# Patient Record
Sex: FEMALE | Race: WHITE | Employment: FULL TIME | URBAN - NONMETROPOLITAN AREA
[De-identification: names, ages, dates, MRNs, and addresses within clinical notes are randomized per-mention and may not be internally consistent; named-entity substitution may affect disease eponyms.]

---

## 2017-02-17 ENCOUNTER — OFFICE VISIT (OUTPATIENT)
Dept: FAMILY MEDICINE | Facility: CLINIC | Age: 37
End: 2017-02-17
Payer: COMMERCIAL

## 2017-02-17 VITALS
WEIGHT: 133 LBS | HEART RATE: 77 BPM | DIASTOLIC BLOOD PRESSURE: 64 MMHG | TEMPERATURE: 97.6 F | HEIGHT: 63 IN | BODY MASS INDEX: 23.57 KG/M2 | SYSTOLIC BLOOD PRESSURE: 102 MMHG | OXYGEN SATURATION: 100 %

## 2017-02-17 DIAGNOSIS — Z00.00 ROUTINE GENERAL MEDICAL EXAMINATION AT A HEALTH CARE FACILITY: Primary | ICD-10-CM

## 2017-02-17 DIAGNOSIS — R87.810 CERVICAL HIGH RISK HPV (HUMAN PAPILLOMAVIRUS) TEST POSITIVE: ICD-10-CM

## 2017-02-17 PROCEDURE — 99395 PREV VISIT EST AGE 18-39: CPT | Performed by: FAMILY MEDICINE

## 2017-02-17 PROCEDURE — G0124 SCREEN C/V THIN LAYER BY MD: HCPCS | Performed by: FAMILY MEDICINE

## 2017-02-17 PROCEDURE — G0145 SCR C/V CYTO,THINLAYER,RESCR: HCPCS | Performed by: FAMILY MEDICINE

## 2017-02-17 PROCEDURE — 87624 HPV HI-RISK TYP POOLED RSLT: CPT | Performed by: FAMILY MEDICINE

## 2017-02-17 NOTE — MR AVS SNAPSHOT
After Visit Summary   2/17/2017    Joel Arthur    MRN: [id number]           Patient Information     Date Of Birth          1980        Visit Information        Provider Department      2/17/2017 9:40 AM Tish Gibson MD [facility]        Today's Diagnoses     Routine general medical examination at a health care facility    -  1    Cervical high risk HPV (human papillomavirus) test positive          Care Instructions      Preventive Health Recommendations  Female Ages 26 - 39  Yearly exam:   See your health care provider every year in order to    Review health changes.     Discuss preventive care.      Review your medicines if you your doctor has prescribed any.    Until age 30: Get a Pap test every three years (more often if you have had an abnormal result).    After age 30: Talk to your doctor about whether you should have a Pap test every 3 years or have a Pap test with HPV screening every 5 years.   You do not need a Pap test if your uterus was removed (hysterectomy) and you have not had cancer.  You should be tested each year for STDs (sexually transmitted diseases), if you're at risk.   Talk to your provider about how often to have your cholesterol checked.  If you are at risk for diabetes, you should have a diabetes test (fasting glucose).  Shots: Get a flu shot each year. Get a tetanus shot every 10 years.   Nutrition:     Eat at least 5 servings of fruits and vegetables each day.    Eat whole-grain bread, whole-wheat pasta and brown rice instead of white grains and rice.    Talk to your provider about Calcium and Vitamin D.     Lifestyle    Exercise at least 150 minutes a week (30 minutes a day, 5 days of the week). This will help you control your weight and prevent disease.    Limit alcohol to one drink per day.    No smoking.     Wear sunscreen to prevent skin cancer.    See your dentist every six months for an exam and cleaning.          Follow-ups after your  "visit        Who to contact     If you have questions or need follow up information about today's clinic visit or your schedule please contact [facility] directly at [phone].  Normal or non-critical lab and imaging results will be communicated to you by Nuzzelhart, letter or phone within 4 business days after the clinic has received the results. If you do not hear from us within 7 days, please contact the clinic through Nuzzelhart or phone. If you have a critical or abnormal lab result, we will notify you by phone as soon as possible.  Submit refill requests through [employer] or call your pharmacy and they will forward the refill request to us. Please allow 3 business days for your refill to be completed.          Additional Information About Your Visit        [facility] Information     [employer] gives you secure access to your electronic health record. If you see a primary care provider, you can also send messages to your care team and make appointments. If you have questions, please call your primary care clinic.  If you do not have a primary care provider, please call [phone] and they will assist you.        Care EveryWhere ID     This is your Care EveryWhere ID. This could be used by other organizations to access your [place] medical records  [id number]        Your Vitals Were     Pulse Temperature Height Last Period Pulse Oximetry BMI (Body Mass Index)    77 97.6  F (36.4  C) (Tympanic) 5' 2.5\" (1.588 m) 02/10/2017 100% 23.94 kg/m2       Blood Pressure from Last 3 Encounters:   02/17/17 102/64   02/15/16 104/72   01/18/16 108/61    Weight from Last 3 Encounters:   02/17/17 133 lb (60.3 kg)   02/15/16 140 lb (63.5 kg)   01/18/16 140 lb (63.5 kg)              Today, you had the following     No orders found for display         Today's Medication Changes          These changes are accurate as of: 2/17/17 10:20 AM.  If you have any questions, ask your nurse or doctor.               Stop taking these " medicines if you haven't already. Please contact your care team if you have questions.     clotrimazole 1 % cream   Commonly known as:  LOTRIMIN   Stopped by:  Tish Gibson MD                    Primary Care Provider Office Phone # Fax #    Tish Gibson [phone]       [facility] [address] [facility] [address]        Thank you!     Thank you for choosing [facility]  for your care. Our goal is always to provide you with excellent care. Hearing back from our patients is one way we can continue to improve our services. Please take a few minutes to complete the written survey that you may receive in the mail after your visit with us. Thank you!             Your Updated Medication List - Protect others around you: Learn how to safely use, store and throw away your medicines at www.disposemymeds.org.          This list is accurate as of: 2/17/17 10:20 AM.  Always use your most recent med list.                   Brand Name Dispense Instructions for use    ibuprofen 400 MG tablet    ADVIL/MOTRIN    120 tablet    Take 400-800 mg by mouth every 6 hours as needed (cramping) Reported on 2/17/2017       WOMENS MULTI VITAMIN & MINERAL PO

## 2017-02-17 NOTE — NURSING NOTE
"Chief Complaint   Patient presents with     Physical     yearly       Initial /64 (BP Location: Right arm, Patient Position: Chair, Cuff Size: Adult Regular)  Pulse 77  Temp 97.6  F (36.4  C) (Tympanic)  Ht 5' 2.5\" (1.588 m)  Wt 133 lb (60.3 kg)  LMP 02/10/2017  SpO2 100%  BMI 23.94 kg/m2 Estimated body mass index is 23.94 kg/(m^2) as calculated from the following:    Height as of this encounter: 5' 2.5\" (1.588 m).    Weight as of this encounter: 133 lb (60.3 kg).  Medication Reconciliation: complete    Health Maintenance that is potentially due pending provider review:  Pap Smear    n/a    "

## 2017-02-17 NOTE — PROGRESS NOTES
SUBJECTIVE:     CC: Joel Arthur is an 36 year old woman who presents for preventive health visit.     Healthy Habits:    Do you get at least three servings of calcium containing foods daily (dairy, green leafy vegetables, etc.)? yes    Amount of exercise or daily activities, outside of work: P90x    Problems taking medications regularly No    Medication side effects: No    Have you had an eye exam in the past two years? yes    Do you see a dentist twice per year? yes  Do you have sleep apnea, excessive snoring or daytime drowsiness?no    Cough-   She has had several colds over the past month. She thought it was bronchitis as the time. She does not have a cough right now.    Today's PHQ-2 Score:   PHQ-2 ( 1999 Pfizer) 2/17/2017 1/18/2016   Q1: Little interest or pleasure in doing things 0 0   Q2: Feeling down, depressed or hopeless 0 0   PHQ-2 Score 0 0     Abuse: Current or Past(Physical, Sexual or Emotional)- No  Do you feel safe in your environment - Yes    Social History   Substance Use Topics     Smoking status: Former Smoker     Types: Cigarettes     Quit date: 1/1/2016     Smokeless tobacco: Never Used      Comment: quit 1/1/10     Alcohol use 0.0 oz/week     0 Standard drinks or equivalent per week      Comment: occas- quit with pregnancy     The patient does not drink >3 drinks per day nor >7 drinks per week.    No results for input(s): CHOL, HDL, LDL, TRIG, CHOLHDLRATIO, NHDL in the last 71632 hours.    Reviewed orders with patient.  Reviewed health maintenance and updated orders accordingly - Yes    Mammo Decision Support:  Mammogram not appropriate for this patient based on age.    Pertinent mammograms are reviewed under the imaging tab.  History of abnormal Pap smear:   Last 3 Pap Results:   PAP (no units)   Date Value   01/18/2016 OTHER-NIL, See Result   07/09/2013 NIL   12/13/2011 NIL   Positive HPV 18 1/18/17    All Histories reviewed and updated in Epic.    ROS:  C: NEGATIVE for fever, chills,  "change in weight  I: NEGATIVE for worrisome rashes, moles or lesions  B: NEGATIVE for masses, tenderness or discharge  CV: NEGATIVE for chest pain, palpitations or peripheral edema  : NEGATIVE for unusual urinary or vaginal symptoms. Periods are regular, but heavy and painful.  M: NEGATIVE for significant arthralgias or myalgia  N: NEGATIVE for weakness, dizziness or paresthesias  P: NEGATIVE for changes in mood or affect    BP Readings from Last 3 Encounters:   02/17/17 102/64   02/15/16 104/72   01/18/16 108/61    Wt Readings from Last 3 Encounters:   02/17/17 133 lb (60.3 kg)   02/15/16 140 lb (63.5 kg)   01/18/16 140 lb (63.5 kg)            OBJECTIVE:     /64 (BP Location: Right arm, Patient Position: Chair, Cuff Size: Adult Regular)  Pulse 77  Temp 97.6  F (36.4  C) (Tympanic)  Ht 5' 2.5\" (1.588 m)  Wt 133 lb (60.3 kg)  LMP 02/10/2017  SpO2 100%  BMI 23.94 kg/m2  EXAM:  GENERAL: healthy, alert and no distress  HENT: ear canals and TM's normal, nose and mouth without ulcers or lesions  NECK: no adenopathy, no asymmetry, masses, or scars and thyroid normal to palpation  RESP: lungs clear to auscultation - no rales, rhonchi or wheezes  BREAST: normal without masses, tenderness or nipple discharge and no palpable axillary masses or adenopathy  CV: regular rate and rhythm, normal S1 S2, no S3 or S4, no murmur, click or rub,   ABDOMEN: soft, nontender, no hepatosplenomegaly, no masses    (female): normal female external genitalia, normal urethral meatus, vaginal mucosa, normal cervix/uterus without masses or discharge. Strings of ParaGard seen. PAP and HPV taken.  SKIN: no suspicious lesions or rashes    ASSESSMENT/PLAN:     Joel was seen today for physical.    Diagnoses and all orders for this visit:    Routine general medical examination at a health care facility  -     Pap imaged thin layer screen with HPV - recommended age 30 - 65 years (select HPV order below)  -     HPV High Risk Types DNA " Cervical    Cervical high risk HPV (human papillomavirus) test positive  -     Pap imaged thin layer screen with HPV - recommended age 30 - 65 years (select HPV order below)  -     HPV High Risk Types DNA Cervical      Discussed what HPV is, cancer risks, and future risks associated with HPV. Patient asked several questions that were answered in detail.    Patient Instructions     Preventive Health Recommendations  Female Ages 26 - 39  Yearly exam:   See your health care provider every year in order to    Review health changes.     Discuss preventive care.      Review your medicines if you your doctor has prescribed any.    Until age 30: Get a Pap test every three years (more often if you have had an abnormal result).    After age 30: Talk to your doctor about whether you should have a Pap test every 3 years or have a Pap test with HPV screening every 5 years.   You do not need a Pap test if your uterus was removed (hysterectomy) and you have not had cancer.  You should be tested each year for STDs (sexually transmitted diseases), if you're at risk.   Talk to your provider about how often to have your cholesterol checked.  If you are at risk for diabetes, you should have a diabetes test (fasting glucose).  Shots: Get a flu shot each year. Get a tetanus shot every 10 years.   Nutrition:     Eat at least 5 servings of fruits and vegetables each day.    Eat whole-grain bread, whole-wheat pasta and brown rice instead of white grains and rice.    Talk to your provider about Calcium and Vitamin D.     Lifestyle    Exercise at least 150 minutes a week (30 minutes a day, 5 days of the week). This will help you control your weight and prevent disease.    Limit alcohol to one drink per day.    No smoking.     Wear sunscreen to prevent skin cancer.    See your dentist every six months for an exam and cleaning.      COUNSELING:   Reviewed preventive health counseling, as reflected in patient instructions   reports that she quit  "smoking about 13 months ago. Her smoking use included Cigarettes. She has never used smokeless tobacco.  Estimated body mass index is 23.94 kg/(m^2) as calculated from the following:    Height as of this encounter: 5' 2.5\" (1.588 m).    Weight as of this encounter: 133 lb (60.3 kg).     Counseling Resources:  ATP IV Guidelines  Pooled Cohorts Equation Calculator  Breast Cancer Risk Calculator  FRAX Risk Assessment  ICSI Preventive Guidelines  Dietary Guidelines for Americans, 2010  USDA's MyPlate  ASA Prophylaxis  Lung CA Screening    This document serves as a record of the services and decisions personally performed and made by Tish Gibson MD. It was created on her behalf by Mandie Leiva, a trained medical scribe. The creation of this document is based the provider's statements to the medical scribe.  Mandie Leiva 10:01 AM 2/17/2017    Provider:   The information in this document, created by the medical scribe for me, accurately reflects the services I personally performed and the decisions made by me. I have reviewed and approved this document for accuracy prior to leaving the patient care area.  Tish Gibson MD 10:01 AM 2/17/2017    Tish Gibson MD  [facility]  "

## 2017-02-22 LAB
COPATH REPORT: ABNORMAL
PAP: ABNORMAL

## 2017-02-23 LAB
FINAL DIAGNOSIS: ABNORMAL
HPV HR 12 DNA CVX QL NAA+PROBE: NEGATIVE
HPV16 DNA SPEC QL NAA+PROBE: NEGATIVE
HPV18 DNA SPEC QL NAA+PROBE: POSITIVE
SPECIMEN DESCRIPTION: ABNORMAL

## 2017-03-20 ENCOUNTER — OFFICE VISIT (OUTPATIENT)
Dept: FAMILY MEDICINE | Facility: CLINIC | Age: 37
End: 2017-03-20
Payer: COMMERCIAL

## 2017-03-20 VITALS
WEIGHT: 132 LBS | SYSTOLIC BLOOD PRESSURE: 102 MMHG | TEMPERATURE: 97.7 F | OXYGEN SATURATION: 100 % | BODY MASS INDEX: 23.76 KG/M2 | DIASTOLIC BLOOD PRESSURE: 64 MMHG | HEART RATE: 83 BPM

## 2017-03-20 DIAGNOSIS — Z30.431 ENCOUNTER FOR ROUTINE CHECKING OF INTRAUTERINE CONTRACEPTIVE DEVICE: ICD-10-CM

## 2017-03-20 DIAGNOSIS — R87.810 CERVICAL HIGH RISK HPV (HUMAN PAPILLOMAVIRUS) TEST POSITIVE: Primary | ICD-10-CM

## 2017-03-20 DIAGNOSIS — R87.619 ATYPICAL CERVICAL GLANDULAR CELLS: ICD-10-CM

## 2017-03-20 DIAGNOSIS — J01.90 ACUTE NON-RECURRENT SINUSITIS, UNSPECIFIED LOCATION: ICD-10-CM

## 2017-03-20 PROCEDURE — 88305 TISSUE EXAM BY PATHOLOGIST: CPT | Mod: 91 | Performed by: FAMILY MEDICINE

## 2017-03-20 PROCEDURE — 57421 EXAM/BIOPSY OF VAG W/SCOPE: CPT | Performed by: FAMILY MEDICINE

## 2017-03-20 PROCEDURE — 99213 OFFICE O/P EST LOW 20 MIN: CPT | Mod: 25 | Performed by: FAMILY MEDICINE

## 2017-03-20 PROCEDURE — 88342 IMHCHEM/IMCYTCHM 1ST ANTB: CPT | Performed by: FAMILY MEDICINE

## 2017-03-20 NOTE — PATIENT INSTRUCTIONS
Take Augmentin 2x daily. Start first dose now and take second dose tonight and continue.     We'll see what biopsies say, but would see OB/gyn to consider next step

## 2017-03-20 NOTE — NURSING NOTE
"Chief Complaint   Patient presents with     Colposcopy     HPV 18       Initial /64 (BP Location: Right arm, Patient Position: Chair, Cuff Size: Adult Regular)  Pulse 83  Temp 97.7  F (36.5  C) (Tympanic)  Wt 132 lb (59.9 kg)  LMP 03/07/2017  SpO2 100%  BMI 23.76 kg/m2 Estimated body mass index is 23.76 kg/(m^2) as calculated from the following:    Height as of 2/17/17: 5' 2.5\" (1.588 m).    Weight as of this encounter: 132 lb (59.9 kg).  Medication Reconciliation: complete    Health Maintenance that is potentially due pending provider review:  NONE    n/a    "

## 2017-03-20 NOTE — MR AVS SNAPSHOT
After Visit Summary   3/20/2017    Joel Arthur    MRN: [id number]           Patient Information     Date Of Birth          1980        Visit Information        Provider Department      3/20/2017 9:20 AM Tish Gibson MD [facility]        Today's Diagnoses     Cervical high risk HPV (human papillomavirus) test positive    -  1    Encounter for routine checking of intrauterine contraceptive device        Acute non-recurrent sinusitis, unspecified location        Atypical cervical glandular cells          Care Instructions    Take Augmentin 2x daily. Start first dose now and take second dose tonight and continue.     We'll see what biopsies say, but would see OB/gyn to consider next step        Follow-ups after your visit        Additional Services     OB/GYN REFERRAL       Your provider has referred you to:  [facility]: [facility] [phone]   [email or web address]    Please be aware that coverage of these services is subject to the terms and limitations of your health insurance plan.  Call member services at your health plan with any benefit or coverage questions.      Please bring the following to your appointment:  >>   Any x-rays, CTs or MRIs which have been performed.  Contact the facility where they were done to arrange for  prior to your scheduled appointment.  Any new CT, MRI or other procedures ordered by your specialist must be performed at a [place] facility or coordinated by your clinic's referral office.    >>   List of current medications   >>   This referral request   >>   Any documents/labs given to you for this referral                  Who to contact     If you have questions or need follow up information about today's clinic visit or your schedule please contact [facility] directly at [phone].  Normal or non-critical lab and imaging results will be communicated to you by MyChart, letter or  phone within 4 business days after the clinic has received the results. If you do not hear from us within 7 days, please contact the clinic through [employer] or phone. If you have a critical or abnormal lab result, we will notify you by phone as soon as possible.  Submit refill requests through [employer] or call your pharmacy and they will forward the refill request to us. Please allow 3 business days for your refill to be completed.          Additional Information About Your Visit        [employer] Information     [employer] gives you secure access to your electronic health record. If you see a primary care provider, you can also send messages to your care team and make appointments. If you have questions, please call your primary care clinic.  If you do not have a primary care provider, please call [phone] and they will assist you.        Care EveryWhere ID     This is your Care EveryWhere ID. This could be used by other organizations to access your [place] medical records  [id number]        Your Vitals Were     Pulse Temperature Last Period Pulse Oximetry BMI (Body Mass Index)       83 97.7  F (36.5  C) (Tympanic) 03/07/2017 100% 23.76 kg/m2        Blood Pressure from Last 3 Encounters:   03/20/17 102/64   02/17/17 102/64   02/15/16 104/72    Weight from Last 3 Encounters:   03/20/17 132 lb (59.9 kg)   02/17/17 133 lb (60.3 kg)   02/15/16 140 lb (63.5 kg)              We Performed the Following     OB/GYN REFERRAL          Today's Medication Changes          These changes are accurate as of: 3/20/17 10:12 AM.  If you have any questions, ask your nurse or doctor.               Start taking these medicines.        Dose/Directions    amoxicillin-clavulanate 875-125 MG per tablet   Commonly known as:  AUGMENTIN   Used for:  Acute non-recurrent sinusitis, unspecified location   Started by:  Tish Gibson MD        Dose:  1 tablet   Take 1 tablet by mouth 2 times daily   Quantity:  20 tablet   Refills:  0             Where to get your medicines      These medications were [id number] [place] [place] [facility] [address] [facility]  [address] , [facility] [address]     Phone:  [phone]     amoxicillin-clavulanate 875-125 MG per tablet                Primary Care Provider Office Phone # Fax #    Tish Gibson [phone]       [facility] [address] [facility] [address]        Thank you!     Thank you for choosing [facility]  for your care. Our goal is always to provide you with excellent care. Hearing back from our patients is one way we can continue to improve our services. Please take a few minutes to complete the written survey that you may receive in the mail after your visit with us. Thank you!             Your Updated Medication List - Protect others around you: Learn how to safely use, store and throw away your medicines at www.disposemymeds.org.          This list is accurate as of: 3/20/17 10:12 AM.  Always use your most recent med list.                   Brand Name Dispense Instructions for use    amoxicillin-clavulanate 875-125 MG per tablet    AUGMENTIN    20 tablet    Take 1 tablet by mouth 2 times daily       ibuprofen 400 MG tablet    ADVIL/MOTRIN    120 tablet    Take 400-800 mg by mouth every 6 hours as needed (cramping) Reported on 2/17/2017       WOMENS MULTI VITAMIN & MINERAL PO

## 2017-03-20 NOTE — PROGRESS NOTES
Referring Physician:  Dr. Veloz  Reason for Colposcopy:  HPV 18, Atypical glandular cells  [phone] (home)   There is no work phone number on file.  Marital status:    Number of pregnancies:  2         Children:   2  Patient's last menstrual period was 03/07/2017.  Abnormal bleeding?No  Abnormal discharge? No  Age at first intercourse:  15  Number of sexual partners (lifetime):  5  Types of contraception (lifetime):  Oral, patch, condoms, IUD  Smoker:  Yes  Allergies   Allergen Reactions     Diflucan [Fluconazole] Itching and Swelling     Tylenol W/Codeine [Acetaminophen-Codeine] Nausea and Vomiting     Current Outpatient Prescriptions   Medication Sig Dispense Refill     Multiple Vitamins-Minerals (WOMENS MULTI VITAMIN & MINERAL PO)        ibuprofen (ADVIL,MOTRIN) 400 MG tablet Take 400-800 mg by mouth every 6 hours as needed (cramping) Reported on 2/17/2017 120 tablet        Nasal congestion-  She has had green and yellow nasal discharge the last 2 weeks. She has sinus pressure and pressure behind her eyes, a frontal headache. She is having a productive cough as well. No fever.     EXAM:  Constitutional: healthy, alert and no distress   ENT: no neck nodes or sinus tenderness and throat normal without erythema or exudate. TM dull, no signs of fluid.  Tender over maxillary and frontal sinuses  Card: regular rate and rhythm   Resp: clear to ascultation   Gyn: see below    HX of previous cryocautery?  NO  Previous Abnormal Paps?  YES - Date: 1/18/16  Personal Hx of Cancer? NO  Family Hx of Cancer?NO  ROMEO Exposure?  NO  Hx of sexual abuse? No  Previous Hysterectomy?  No  Other Gyn Surgery?    Hx of Veneral Disease?NO  Do you desire testing for any of these diseases?  No  HX of genital warts? No  Partner(s) with warts?: No  Visible warts now?  No  Previously treated?  No  If yes, how?      Previous paps (date & results):  Lab Results   Component Value Date    PAP ATYP 02/17/2017    PAP OTHER-NIL, See  Result 01/18/2016    PAP NIL 07/09/2013      Pap repeated?: no  Previous Biopsy?: yes  Previous Cryo?: no  Previous LEEP?: no  Previous Laser?: no    COLPOSCOPIC EXAM: satisfactory    Examined without staining?: no acetowhite   Examined with Acetic Acid staining?:Yes, 3 areas 1-2:00, 10:00, 3:00  Examined with Lugol's Iodine staining?: No  Vagina examined?: Yes   Vulva examined?: NO  Refer to Education Images?: Yes  IUD strings in place    Colposcopic Impression: satisfactory colpo. Several samples taken. Pathology pending.    Post Colposcopy Instructions given?: yes     Answered several questions from patient about options if results come back abnormal. Patient would like to consider removal of her uterus to prevent any further problems. Referral to OB/GYN given.    Endometrial biopsy not preformed due to IUD in place. Offered, but would have to remove current IUD, then do the biopsy, they replace it. She would like to defer this as well. Will see what biopsies show first. Will get OB/gyn opinion.    This document serves as a record of the services and decisions personally performed and made by Tish Gibson MD. It was created on her behalf by Mandie Leiva, a trained medical scribe. The creation of this document is based the provider's statements to the medical scribe.  Mandie Leiva 10:08 AM 3/20/2017    Provider:   The information in this document, created by the medical scribe for me, accurately reflects the services I personally performed and the decisions made by me. I have reviewed and approved this document for accuracy prior to leaving the patient care area.  Tish Gibson MD 10:08 AM 3/20/2017    Tish Gibson MD   [facility]

## 2017-03-23 LAB — COPATH REPORT: NORMAL

## 2017-03-24 ENCOUNTER — MYC MEDICAL ADVICE (OUTPATIENT)
Dept: FAMILY MEDICINE | Facility: CLINIC | Age: 37
End: 2017-03-24

## 2017-03-24 ENCOUNTER — TELEPHONE (OUTPATIENT)
Dept: FAMILY MEDICINE | Facility: CLINIC | Age: 37
End: 2017-03-24

## 2017-03-24 NOTE — TELEPHONE ENCOUNTER
Dr. Kendall spoke to the pt.  Chela Panchal RN    Notes Recorded by Tish Gibson MD on 3/24/2017 at 11:02 AM  Discussed with patient via telephone.

## 2017-03-27 ENCOUNTER — MYC MEDICAL ADVICE (OUTPATIENT)
Dept: FAMILY MEDICINE | Facility: CLINIC | Age: 37
End: 2017-03-27

## 2017-03-27 DIAGNOSIS — F43.22 ADJUSTMENT DISORDER WITH ANXIOUS MOOD: Primary | ICD-10-CM

## 2017-03-27 NOTE — TELEPHONE ENCOUNTER
Called and talked to [facility]  [place]-[phone]- [phone] available until April 24th per [place]  [facility] location- [phone] - [phone] ange  no openings until April 13   [place] Office [phone]   [facility]

## 2017-03-27 NOTE — TELEPHONE ENCOUNTER
Pt does have appt with [place] [facility] 3/31/17 [facility]  Pt aware and understands  [facility]

## 2017-03-27 NOTE — TELEPHONE ENCOUNTER
Pt called this am about getting pt into the G[facility] Oncology  Schedule appt.  Did talk to them at the [facility] they have talked with pt today 3/27/17. [facility] is out till the middle of April. Records are being reviewed to see how soon they can fit into schedules.   Will see how far out appts are at [facility].  [facility]

## 2017-03-28 ENCOUNTER — MYC MEDICAL ADVICE (OUTPATIENT)
Dept: FAMILY MEDICINE | Facility: CLINIC | Age: 37
End: 2017-03-28

## 2017-03-28 RX ORDER — LORAZEPAM 0.5 MG/1
0.5 TABLET ORAL EVERY 8 HOURS PRN
Qty: 20 TABLET | Refills: 0 | Status: SHIPPED | OUTPATIENT
Start: 2017-03-28 | End: 2017-04-11

## 2017-03-28 NOTE — TELEPHONE ENCOUNTER
Pt notified and understood.  Signed RX Lorazepam faxed to [facility]  Gyn/Oncology Appt scheduled 4/3/17  [facility]

## 2017-03-28 NOTE — TELEPHONE ENCOUNTER
Pt is taking appt 4/3/17 Appt at the [facility]  [facility] cancelled for 3/31/17  [facility]

## 2017-03-29 ASSESSMENT — ENCOUNTER SYMPTOMS
ALTERED TEMPERATURE REGULATION: 1
PANIC: 1
STIFFNESS: 0
ARTHRALGIAS: 0
TASTE DISTURBANCE: 0
DECREASED APPETITE: 0
MUSCLE CRAMPS: 1
INSOMNIA: 1
DECREASED CONCENTRATION: 1
TROUBLE SWALLOWING: 0
NECK PAIN: 0
FEVER: 0
SINUS CONGESTION: 1
WEIGHT LOSS: 0
FATIGUE: 1
MUSCLE WEAKNESS: 1
CHILLS: 1
DECREASED LIBIDO: 1
HOT FLASHES: 1
SORE THROAT: 1
BACK PAIN: 0
POLYPHAGIA: 1
NERVOUS/ANXIOUS: 1
NIGHT SWEATS: 1
WEIGHT GAIN: 0
DEPRESSION: 0
NECK MASS: 0
JOINT SWELLING: 0
HOARSE VOICE: 0
INCREASED ENERGY: 1
SINUS PAIN: 1
HALLUCINATIONS: 0
MYALGIAS: 1
SMELL DISTURBANCE: 0
POLYDIPSIA: 1

## 2017-04-03 ENCOUNTER — ONCOLOGY VISIT (OUTPATIENT)
Dept: ONCOLOGY | Facility: CLINIC | Age: 37
End: 2017-04-03
Attending: OBSTETRICS & GYNECOLOGY
Payer: COMMERCIAL

## 2017-04-03 VITALS
HEIGHT: 63 IN | TEMPERATURE: 99 F | SYSTOLIC BLOOD PRESSURE: 111 MMHG | DIASTOLIC BLOOD PRESSURE: 70 MMHG | RESPIRATION RATE: 16 BRPM | HEART RATE: 70 BPM | WEIGHT: 134.6 LBS | OXYGEN SATURATION: 100 % | BODY MASS INDEX: 23.85 KG/M2

## 2017-04-03 DIAGNOSIS — C53.9 ADENOCARCINOMA OF CERVIX (H): Primary | ICD-10-CM

## 2017-04-03 PROCEDURE — 99212 OFFICE O/P EST SF 10 MIN: CPT | Mod: ZF

## 2017-04-03 PROCEDURE — 99204 OFFICE O/P NEW MOD 45 MIN: CPT | Mod: ZP | Performed by: OBSTETRICS & GYNECOLOGY

## 2017-04-03 RX ORDER — ACETAMINOPHEN 325 MG/1
975 TABLET ORAL ONCE
Status: CANCELLED | OUTPATIENT
Start: 2017-04-03 | End: 2017-04-03

## 2017-04-03 RX ORDER — KETOROLAC TROMETHAMINE 30 MG/ML
30 INJECTION, SOLUTION INTRAMUSCULAR; INTRAVENOUS ONCE
Status: CANCELLED | OUTPATIENT
Start: 2017-04-03 | End: 2017-04-03

## 2017-04-03 ASSESSMENT — PAIN SCALES - GENERAL: PAINLEVEL: NO PAIN (0)

## 2017-04-03 NOTE — PROGRESS NOTES
Consult Notes on Referred Patient    Date: 2017       Dr. Tish Gibson MD  [facility]  [address] [place] [address]       RE: Joel Arthur  : 1980  AJIT: 4/3/2017    Dear Dr. Tish Gibson:    I had the pleasure of seeing your patient Joel Arthur here at the Gynecologic Cancer Clinic at the [facility] on 4/3/2017.  As you know she is a very pleasant 36 year old woman with a recent diagnosis of adenocarcinoma of the cervix.  Given these findings she was subsequently sent to the Gynecologic Cancer Clinic for new patient consultation.     HPI:    Lab Results   Component Value Date    PAP ATYP 2017    PAP OTHER-NIL, See Result 2016    PAP NIL 2013       Pap atypical glandular cells  17: HPV 18+    3/20/17  SPECIMEN(S):   A: Endocervical curettings   B: Cervical biopsy, 1-2 o'clock   C: Cervical biopsy, 3 o'clock   D: Cervical biopsy, 10 o'clock     FINAL DIAGNOSIS:   A. Cervix, endocervical curettings:   - Cervical adenocarcinoma, intestinal type - see description.     B. Cervix, 1-2 o'clock, biopsy:   - Cervical adenocarcinoma, intestinal type - see description.     C. Cervix, 3 o'clock, biopsy:   - Cervical glandular and squamous epithelium with no evidence of   dysplasia or malignancy.   - Moderate chronic inflammation with reactive epithelial changes.     D. Cervix, 10 o'clock, biopsy:   - Cervical glandular and squamous epithelium with no evidence of   dysplasia or malignancy.     Today: Joel presents to clinic today with her . Pathology results were discussed with the patient in detail. Depth of invasion was unclear from report. Recommended cold knife cone with possible radical hysterectomy, depending on depth of invasion. Ovaries will be left in place due to age. She has 2 children (vaginal births) and she is done having children. No history of diabetes, heart or lung disease. She quit smoking one week  ago after her original diagnosis. Previously would smoke 5-10 cigarettes a day. Recently given prescription for Ativan due to issue with anxiety and sleep. First abnormal pap was 1 year ago, HPV+ result given at that time.     Review of Systems:    Answers for HPI/ROS submitted by the patient on 3/29/2017   General Symptoms: Yes  Skin Symptoms: No  HENT Symptoms: Yes  EYE SYMPTOMS: No  HEART SYMPTOMS: No  LUNG SYMPTOMS: No  INTESTINAL SYMPTOMS: No  URINARY SYMPTOMS: No  GYNECOLOGIC SYMPTOMS: Yes  BREAST SYMPTOMS: No  SKELETAL SYMPTOMS: Yes  BLOOD SYMPTOMS: No  NERVOUS SYSTEM SYMPTOMS: No  MENTAL HEALTH SYMPTOMS: Yes  Fever: No  Loss of appetite: No  Weight loss: No  Weight gain: No  Fatigue: Yes  Night sweats: Yes  Chills: Yes  Increased stress: Yes  Excessive hunger: Yes  Excessive thirst: Yes  Feeling hot or cold when others believe the temperature is normal: Yes  Loss of height: No  Post-operative complications: No  Surgical site pain: No  Hallucinations: No  Change in or Loss of Energy: Yes  Hyperactivity: No  Confusion: Yes  Ear pain: No  Ear discharge: No  Hearing loss: No  Tinnitus: No  Nosebleeds: Yes  Congestion: Yes  Sinus pain: Yes  Trouble swallowing: No   Voice hoarseness: No  Mouth sores: No  Sore throat: Yes  Tooth pain: No  Gum tenderness: No  Bleeding gums: No  Change in taste: No  Change in sense of smell: No  Dry mouth: Yes  Hearing aid used: No  Neck lump: No  Back pain: No  Muscle aches: Yes  Neck pain: No  Swollen joints: No  Joint pain: No  Bone pain: No  Muscle cramps: Yes  Muscle weakness: Yes  Joint stiffness: No  Bone fracture: No  Bleeding or spotting between periods: Yes  Heavy or painful periods: Yes  Irregular periods: No  Vaginal discharge: Yes  Hot flashes: Yes  Vaginal dryness: No  Genital ulcers: No  Reduced libido: Yes  Painful intercourse: Yes  Difficulty with sexual arousal: No  Post-menopausal bleeding: No  Nervous or Anxious: Yes  Depression: No  Trouble sleeping: Yes  Trouble  thinking or concentrating: Yes  Mood changes: No  Panic attacks: Yes    I have reviewed and addressed the patient's review of symptoms for today's visit.     Past Medical History:    Past Medical History:   Diagnosis Date     Atypical glandular cells of undetermined significance (GERONIMO) on cervical Pap smear 02/17/2017    + HR HPV 18     Cervical high risk HPV (human papillomavirus) test positive 1/18/16    type 18     Chickenpox      H/O colposcopy with cervical biopsy 03/20/2017    Bx & ECC - cervical adenocarcinoma       Past Surgical History:    Past Surgical History:   Procedure Laterality Date     MOUTH SURGERY      wisdom teeth         Health Maintenance:  There are no preventive care reminders to display for this patient.    Last Pap Smear: 2/17/17              Result: abnormal: atypical glandular cells  She has not had a history of abnormal Pap smears.    Last Mammogram: none              Result: not examined                            Current Medications:     has a current medication list which includes the following prescription(s): lorazepam, amoxicillin-clavulanate, multiple vitamins-minerals, and ibuprofen.       Allergies:     Allergies   Allergen Reactions     Diflucan [Fluconazole] Itching and Swelling     Tylenol W/Codeine [Acetaminophen-Codeine] Nausea and Vomiting            Social History:     Social History   Substance Use Topics     Smoking status: Former Smoker     Types: Cigarettes     Quit date: 1/1/2016     Smokeless tobacco: Never Used      Comment: quit 1/1/10     Alcohol use 0.0 oz/week     0 Standard drinks or equivalent per week      Comment: occas- quit with pregnancy       History   Drug Use No           Family History:     The patient's family history is notable for non.    Family History   Problem Relation Age of Onset     Thyroid Disease Mother      Lipids Mother      Depression Mother      HEART DISEASE Maternal Grandmother      MI     CANCER Maternal Grandfather      kidney  "        Physical Exam:     /70 (BP Location: Right arm, Patient Position: Chair, Cuff Size: Adult Regular)  Pulse 70  Temp 99  F (37.2  C) (Oral)  Resp 16  Ht 1.588 m (5' 2.52\")  Wt 61.1 kg (134 lb 9.6 oz)  LMP 04/03/2017 (Approximate)  SpO2 100%  BMI 24.21 kg/m2  Body mass index is 24.21 kg/(m^2).    General Appearance: healthy and alert, no distress     HEENT:  no thyromegaly, no palpable nodules or masses        Cardiovascular: regular rate and rhythm, no gallops, rubs or murmurs     Respiratory: lungs clear, no rales, rhonchi or wheezes, normal diaphragmatic excursion    Musculoskeletal: extremities non tender and without edema    Skin: no lesions or rashes     Neurological: normal gait, no gross defects     Psychiatric: appropriate mood and affect                               Hematological: normal cervical, supraclavicular and inguinal lymph nodes     Gastrointestinal:       abdomen soft, non-tender, non-distended, no organomegaly or masses    Genitourinary: External genitalia and urethral meatus appears normal.  Vagina is smooth without nodularity or masses.  Cervix appears multiparous, no lesions or masses, IUD string present. Noticeable white, thickened areas along periclitoral area. Bimanual exam reveal no masses, nodularity or fullness.  Recto-vaginal exam confirms these findings.      Assessment:    Joel Arthur is a 36 year old woman with a new diagnosis of adenocarcinoma of the cervix.        Plan:     1.)     Will schedule cold knife cone, colposcopy, biopsy of periclitoral area and removal of mirena IUD. Type of hysterectomy will be dependent upon cold knife cone results and depth of invasion. Written and verbal consent obtained from patient.      2.) Genetic risk factors were assessed and the patient does not meet the qualifications for a referral.      3.) Labs and/or tests ordered include:  none.     4.) Health maintenance issues addressed today include none.    5.) Pre-op teaching " was completed today.  Risks of surgery were discussed to include: bleeding, transfusion, infection, unintentional injury to surrounding organs/structures.      Thank you for allowing us to participate in the care of your patient.         Sincerely,    Martha Talbert MD    Department of Ob/Gyn and Women's Health  Division of Gynecologic Oncology  [facility]  [phone]      CC  Patient Care Team:  Danette Clark MD as PCP - General (Family Practice)  DANETTE CLARK    I have reviewed the above note and agree with the scribe's notation as written.    I, Jak Mills, am serving as a scribe to document services personally performed by Martha Talbert MD, based upon my observations and the provider's statements to me. All documentation has been reviewed by the aforementioned doctor prior to being entered into the official medical record.

## 2017-04-03 NOTE — MR AVS SNAPSHOT
After Visit Summary   4/3/2017    Joel Arthur    MRN: [id number]           Patient Information     Date Of Birth          1980        Visit Information        Provider Department      4/3/2017 3:40 PM Martha Talbert MD [facility]        Today's Diagnoses     Adenocarcinoma of cervix (H)    -  1       Follow-ups after your visit        Your next 10 appointments already scheduled     May 24, 2017   Procedure with Martha Talbert MD   [facility] [place], Same Day Surgery (--)    [address]  [facility] [address] [phone]            Pepe 15, 2017 11:40 AM CDT   (Arrive by 11:25 AM)   Post-Op with Martha Talbert MD   [facility])    [address] [facility] [address] [phone]              Who to contact     If you have questions or need follow up information about today's clinic visit or your schedule please contact [facility] directly at [phone].  Normal or non-critical lab and imaging results will be communicated to you by [employer], letter or phone within 4 business days after the clinic has received the results. If you do not hear from us within 7 days, please contact the clinic through [employer] or phone. If you have a critical or abnormal lab result, we will notify you by phone as soon as possible.  Submit refill requests through [employer] or call your pharmacy and they will forward the refill request to us. Please allow 3 business days for your refill to be completed.          Additional Information About Your Visit        [employer] Information     [employer] gives you secure access to your electronic health record. If you see a primary care provider, you can also send messages to your care team and make appointments. If you have questions, please call your primary care clinic.  If you do not have a primary care provider, please call [phone] and they  "will assist you.        Care EveryWhere ID     This is your Care EveryWhere ID. This could be used by other organizations to access your [facility] medical records  [id number]        Your Vitals Were     Pulse Temperature Respirations Height Last Period Pulse Oximetry    70 99  F (37.2  C) (Oral) 16 1.588 m (5' 2.52\") 04/03/2017 (Approximate) 100%    BMI (Body Mass Index)                   24.21 kg/m2            Blood Pressure from Last 3 Encounters:   04/27/17 105/69   04/13/17 104/62   04/03/17 111/70    Weight from Last 3 Encounters:   04/27/17 61.1 kg (134 lb 12.8 oz)   04/13/17 60.8 kg (134 lb)   04/03/17 61.1 kg (134 lb 9.6 oz)              We Performed the Following     [facility]alli-Operative Worksheet - Exam under anesthesia, Colposcopy, Cold knife cone biopsy          Today's Medication Changes          These changes are accurate as of: 4/3/17 11:59 PM.  If you have any questions, ask your nurse or doctor.               Stop taking these medicines if you haven't already. Please contact your care team if you have questions.     amoxicillin-clavulanate 875-125 MG per tablet   Commonly known as:  AUGMENTIN   Stopped by:  Martha Talbert MD           ibuprofen 400 MG tablet   Commonly known as:  ADVIL/MOTRIN   Stopped by:  Martha Talbert MD                    Primary Care Provider Office Phone # Fax #    Tish Gibson [phone]       [facility] [address] [place] [id number]        Thank you!     Thank you for choosing [facility]  for your care. Our goal is always to provide you with excellent care. Hearing back from our patients is one way we can continue to improve our services. Please take a few minutes to complete the written survey that you may receive in the mail after your visit with us. Thank you!             Your Updated Medication List - Protect others around you: Learn how to safely use, store and throw away your medicines at " www.disposemymeds.org.          This list is accurate as of: 4/3/17 11:59 PM.  Always use your most recent med list.                   Brand Name Dispense Instructions for use    WOMENS MULTI VITAMIN & MINERAL PO      Take by mouth daily

## 2017-04-03 NOTE — NURSING NOTE
"Joel Arthur is a 36 year old female who presents for:  Chief Complaint   Patient presents with     Oncology Clinic Visit     Cervical Adenocarcinoma        Initial Vitals:  /70 (BP Location: Right arm, Patient Position: Chair, Cuff Size: Adult Regular)  Pulse 70  Temp 99  F (37.2  C) (Oral)  Resp 16  Ht 1.588 m (5' 2.52\")  Wt 61.1 kg (134 lb 9.6 oz)  LMP 04/03/2017 (Approximate)  SpO2 100%  BMI 24.21 kg/m2 Estimated body mass index is 24.21 kg/(m^2) as calculated from the following:    Height as of this encounter: 1.588 m (5' 2.52\").    Weight as of this encounter: 61.1 kg (134 lb 9.6 oz).. Body surface area is 1.64 meters squared. BP completed using cuff size: regular  No Pain (0) Patient's last menstrual period was 04/03/2017 (approximate). Allergies and medications reviewed.     Medications: Medication refills not needed today.  Pharmacy name entered into [employer]:    [id number] - [place], [place] - [address] [facility]  [id number] - [place], [place] - [address] [facility]    Comments:     7 minutes for nursing intake (face to face time)   Sharri Kirk LPN        "

## 2017-04-03 NOTE — LETTER
4/3/2017       RE: Joel Arthur  [address] [facility] [address]     Dear Colleague,    Thank you for referring your patient, Joel Arthur, to the [facility] CANCER [facility]IC. Please see a copy of my visit note below.                            Consult Notes on Referred Patient    Date: 2017       Dr. Tish Gibson MD  [facility]  [address] [place] [address]       RE: Joel Arthur  : 1980  AJIT: 4/3/2017    Dear Dr. Tish Gibson:    I had the pleasure of seeing your patient Joel Arthur here at the Gynecologic Cancer Clinic at the [facility] on 4/3/2017.  As you know she is a very pleasant 36 year old woman with a recent diagnosis of adenocarcinoma of the cervix.  Given these findings she was subsequently sent to the Gynecologic Cancer Clinic for new patient consultation.     HPI:    Lab Results   Component Value Date    PAP ATYP 2017    PAP OTHER-NIL, See Result 2016    PAP NIL 2013       Pap atypical glandular cells  17: HPV 18+    3/20/17  SPECIMEN(S):   A: Endocervical curettings   B: Cervical biopsy, 1-2 o'clock   C: Cervical biopsy, 3 o'clock   D: Cervical biopsy, 10 o'clock     FINAL DIAGNOSIS:   A. Cervix, endocervical curettings:   - Cervical adenocarcinoma, intestinal type - see description.     B. Cervix, 1-2 o'clock, biopsy:   - Cervical adenocarcinoma, intestinal type - see description.     C. Cervix, 3 o'clock, biopsy:   - Cervical glandular and squamous epithelium with no evidence of   dysplasia or malignancy.   - Moderate chronic inflammation with reactive epithelial changes.     D. Cervix, 10 o'clock, biopsy:   - Cervical glandular and squamous epithelium with no evidence of   dysplasia or malignancy.     Today: Joel presents to clinic today with her . Pathology results were discussed with the patient in detail. Depth of invasion was unclear from report. Recommended cold knife cone with possible  radical hysterectomy, depending on depth of invasion. Ovaries will be left in place due to age. She has 2 children (vaginal births) and she is done having children. No history of diabetes, heart or lung disease. She quit smoking one week ago after her original diagnosis. Previously would smoke 5-10 cigarettes a day. Recently given prescription for Ativan due to issue with anxiety and sleep. First abnormal pap was 1 year ago, HPV+ result given at that time.     Review of Systems:    Answers for HPI/ROS submitted by the patient on 3/29/2017   General Symptoms: Yes  Skin Symptoms: No  HENT Symptoms: Yes  EYE SYMPTOMS: No  HEART SYMPTOMS: No  LUNG SYMPTOMS: No  INTESTINAL SYMPTOMS: No  URINARY SYMPTOMS: No  GYNECOLOGIC SYMPTOMS: Yes  BREAST SYMPTOMS: No  SKELETAL SYMPTOMS: Yes  BLOOD SYMPTOMS: No  NERVOUS SYSTEM SYMPTOMS: No  MENTAL HEALTH SYMPTOMS: Yes  Fever: No  Loss of appetite: No  Weight loss: No  Weight gain: No  Fatigue: Yes  Night sweats: Yes  Chills: Yes  Increased stress: Yes  Excessive hunger: Yes  Excessive thirst: Yes  Feeling hot or cold when others believe the temperature is normal: Yes  Loss of height: No  Post-operative complications: No  Surgical site pain: No  Hallucinations: No  Change in or Loss of Energy: Yes  Hyperactivity: No  Confusion: Yes  Ear pain: No  Ear discharge: No  Hearing loss: No  Tinnitus: No  Nosebleeds: Yes  Congestion: Yes  Sinus pain: Yes  Trouble swallowing: No   Voice hoarseness: No  Mouth sores: No  Sore throat: Yes  Tooth pain: No  Gum tenderness: No  Bleeding gums: No  Change in taste: No  Change in sense of smell: No  Dry mouth: Yes  Hearing aid used: No  Neck lump: No  Back pain: No  Muscle aches: Yes  Neck pain: No  Swollen joints: No  Joint pain: No  Bone pain: No  Muscle cramps: Yes  Muscle weakness: Yes  Joint stiffness: No  Bone fracture: No  Bleeding or spotting between periods: Yes  Heavy or painful periods: Yes  Irregular periods: No  Vaginal discharge: Yes  Hot  flashes: Yes  Vaginal dryness: No  Genital ulcers: No  Reduced libido: Yes  Painful intercourse: Yes  Difficulty with sexual arousal: No  Post-menopausal bleeding: No  Nervous or Anxious: Yes  Depression: No  Trouble sleeping: Yes  Trouble thinking or concentrating: Yes  Mood changes: No  Panic attacks: Yes    I have reviewed and addressed the patient's review of symptoms for today's visit.     Past Medical History:    Past Medical History:   Diagnosis Date     Atypical glandular cells of undetermined significance (GERONIMO) on cervical Pap smear 02/17/2017    + HR HPV 18     Cervical high risk HPV (human papillomavirus) test positive 1/18/16    type 18     Chickenpox      H/O colposcopy with cervical biopsy 03/20/2017    Bx & ECC - cervical adenocarcinoma       Past Surgical History:    Past Surgical History:   Procedure Laterality Date     MOUTH SURGERY      wisdom teeth         Health Maintenance:  There are no preventive care reminders to display for this patient.    Last Pap Smear: 2/17/17              Result: abnormal: atypical glandular cells  She has not had a history of abnormal Pap smears.    Last Mammogram: none              Result: not examined                            Current Medications:     has a current medication list which includes the following prescription(s): lorazepam, amoxicillin-clavulanate, multiple vitamins-minerals, and ibuprofen.       Allergies:     Allergies   Allergen Reactions     Diflucan [Fluconazole] Itching and Swelling     Tylenol W/Codeine [Acetaminophen-Codeine] Nausea and Vomiting            Social History:     Social History   Substance Use Topics     Smoking status: Former Smoker     Types: Cigarettes     Quit date: 1/1/2016     Smokeless tobacco: Never Used      Comment: quit 1/1/10     Alcohol use 0.0 oz/week     0 Standard drinks or equivalent per week      Comment: occas- quit with pregnancy       History   Drug Use No           Family History:     The patient's family  "history is notable for non.    Family History   Problem Relation Age of Onset     Thyroid Disease Mother      Lipids Mother      Depression Mother      HEART DISEASE Maternal Grandmother      MI     CANCER Maternal Grandfather      kidney         Physical Exam:     /70 (BP Location: Right arm, Patient Position: Chair, Cuff Size: Adult Regular)  Pulse 70  Temp 99  F (37.2  C) (Oral)  Resp 16  Ht 1.588 m (5' 2.52\")  Wt 61.1 kg (134 lb 9.6 oz)  LMP 04/03/2017 (Approximate)  SpO2 100%  BMI 24.21 kg/m2  Body mass index is 24.21 kg/(m^2).    General Appearance: healthy and alert, no distress     HEENT:  no thyromegaly, no palpable nodules or masses        Cardiovascular: regular rate and rhythm, no gallops, rubs or murmurs     Respiratory: lungs clear, no rales, rhonchi or wheezes, normal diaphragmatic excursion    Musculoskeletal: extremities non tender and without edema    Skin: no lesions or rashes     Neurological: normal gait, no gross defects     Psychiatric: appropriate mood and affect                               Hematological: normal cervical, supraclavicular and inguinal lymph nodes     Gastrointestinal:       abdomen soft, non-tender, non-distended, no organomegaly or masses    Genitourinary: External genitalia and urethral meatus appears normal.  Vagina is smooth without nodularity or masses.  Cervix appears multiparous, no lesions or masses, IUD string present. Noticeable white, thickened areas along periclitoral area. Bimanual exam reveal no masses, nodularity or fullness.  Recto-vaginal exam confirms these findings.      Assessment:    Joel Arthur is a 36 year old woman with a new diagnosis of adenocarcinoma of the cervix.        Plan:     1.)     Will schedule cold knife cone, colposcopy, biopsy of periclitoral area and removal of mirena IUD. Type of hysterectomy will be dependent upon cold knife cone results and depth of invasion. Written and verbal consent obtained from patient.    "   2.) Genetic risk factors were assessed and the patient does not meet the qualifications for a referral.      3.) Labs and/or tests ordered include:  none.     4.) Health maintenance issues addressed today include none.    5.) Pre-op teaching was completed today.  Risks of surgery were discussed to include: bleeding, transfusion, infection, unintentional injury to surrounding organs/structures.      Thank you for allowing us to participate in the care of your patient.         Sincerely,    Martha Talbert MD    Department of Ob/Gyn and Women's Health  Division of Gynecologic Oncology  [facility]  [phone]        Patient Care Team:  Tish Gibson MD as PCP - General (Family Practice)      I have reviewed the above note and agree with the scribe's notation as written.    I, Jak Mills, am serving as a scribe to document services personally performed by Martha Talbert MD, based upon my observations and the provider's statements to me. All documentation has been reviewed by the aforementioned doctor prior to being entered into the official medical record.         Again, thank you for allowing me to participate in the care of your patient.      Sincerely,    Martha Talbert MD

## 2017-04-04 NOTE — NURSING NOTE
Pre Op Nurse Teaching Template    Relevant Diagnosis: adenocarcinoma of cervix    Teaching Topic:  Cold knife cone biopsy colposcopy biopsy of periclitoral area removal of IUD    Person(s) involved in teaching :  Patient  & spouse  Motivation Level:  Asks Questions:    Yes      Eager to Learn:     Yes     Cooperative:          Yes    Receptive (willing. Able to accept information):    Yes      Patient and those who are listed above demonstrates understanding of the following:   Reason for the appointment, diagnosis and treatment plan:   Yes   Knowledge of proper use of medications and conditions for which they are ordered (with special attention to potential side effects or drug interactions): Yes   Which situations necessitate calling provider and whom to contact: Yes         Nutritional needs and diet plan:  Yes      Pain management techniques:     Yes, Pain Scale   Diet:   Yes, [facility] Diet Instructions    Teaching Concerns addressed: Yes    Infection Prevention:  Patient and those who are listed above demonstrate understanding of the following:  Pre-Op CHG Bathing Instructions: Yes  Surgical procedure site care taught:   Yes   Signs and symptoms of infection taught: Yes       Instructional Materials Used/Given:  The [facility] Before You Surgery Booklet  Showering or Bathing before Surgery Instructions & CHG Product  Hysterectomy Guidelines  Pain Assessment Tool   Home Care after Major Abdominal or Vaginal Surgery  Map  Accommodations Brochure  Phone numbers for [facility] and [facility]  Copy of Surgical Consent    Done Today:       Preop Visit not needed   Tests Ordered:  Post Op Visit Scheduled:tbd  Comments:    Surgery date/time:4/13    Consent sent to file in medical records  .

## 2017-04-11 ENCOUNTER — MYC MEDICAL ADVICE (OUTPATIENT)
Dept: FAMILY MEDICINE | Facility: CLINIC | Age: 37
End: 2017-04-11

## 2017-04-11 DIAGNOSIS — F43.22 ADJUSTMENT DISORDER WITH ANXIOUS MOOD: ICD-10-CM

## 2017-04-11 PROCEDURE — 00000346 ZZHCL STATISTIC REVIEW OUTSIDE SLIDES TC 88321: Performed by: OBSTETRICS & GYNECOLOGY

## 2017-04-11 RX ORDER — LORAZEPAM 0.5 MG/1
0.5 TABLET ORAL EVERY 8 HOURS PRN
Qty: 30 TABLET | Refills: 1 | Status: SHIPPED | OUTPATIENT
Start: 2017-04-11 | End: 2019-02-28

## 2017-04-12 ENCOUNTER — ANESTHESIA EVENT (OUTPATIENT)
Dept: SURGERY | Facility: AMBULATORY SURGERY CENTER | Age: 37
End: 2017-04-12

## 2017-04-13 ENCOUNTER — SURGERY (OUTPATIENT)
Age: 37
End: 2017-04-13

## 2017-04-13 ENCOUNTER — HOSPITAL ENCOUNTER (OUTPATIENT)
Facility: AMBULATORY SURGERY CENTER | Age: 37
End: 2017-04-13
Attending: OBSTETRICS & GYNECOLOGY

## 2017-04-13 ENCOUNTER — ANESTHESIA (OUTPATIENT)
Dept: SURGERY | Facility: AMBULATORY SURGERY CENTER | Age: 37
End: 2017-04-13

## 2017-04-13 VITALS
HEART RATE: 66 BPM | TEMPERATURE: 97.6 F | HEIGHT: 62 IN | BODY MASS INDEX: 24.66 KG/M2 | RESPIRATION RATE: 16 BRPM | DIASTOLIC BLOOD PRESSURE: 62 MMHG | SYSTOLIC BLOOD PRESSURE: 104 MMHG | OXYGEN SATURATION: 100 % | WEIGHT: 134 LBS

## 2017-04-13 DIAGNOSIS — G89.18 POST-OP PAIN: Primary | ICD-10-CM

## 2017-04-13 DIAGNOSIS — C53.9 ADENOCARCINOMA OF CERVIX (H): ICD-10-CM

## 2017-04-13 LAB
HCG UR QL: NEGATIVE
HGB BLD-MCNC: 13 G/DL (ref 11.7–15.7)
INTERNAL QC OK POCT: YES
POTASSIUM SERPL-SCNC: 4 MMOL/L (ref 3.4–5.3)

## 2017-04-13 RX ORDER — ONDANSETRON 4 MG/1
4 TABLET, ORALLY DISINTEGRATING ORAL EVERY 30 MIN PRN
Status: DISCONTINUED | OUTPATIENT
Start: 2017-04-13 | End: 2017-04-14 | Stop reason: HOSPADM

## 2017-04-13 RX ORDER — BUPIVACAINE HYDROCHLORIDE 5 MG/ML
INJECTION, SOLUTION PERINEURAL PRN
Status: DISCONTINUED | OUTPATIENT
Start: 2017-04-13 | End: 2017-04-13 | Stop reason: HOSPADM

## 2017-04-13 RX ORDER — PROPOFOL 10 MG/ML
INJECTION, EMULSION INTRAVENOUS CONTINUOUS PRN
Status: DISCONTINUED | OUTPATIENT
Start: 2017-04-13 | End: 2017-04-13

## 2017-04-13 RX ORDER — FENTANYL CITRATE 50 UG/ML
INJECTION, SOLUTION INTRAMUSCULAR; INTRAVENOUS PRN
Status: DISCONTINUED | OUTPATIENT
Start: 2017-04-13 | End: 2017-04-13

## 2017-04-13 RX ORDER — SODIUM CHLORIDE, SODIUM LACTATE, POTASSIUM CHLORIDE, CALCIUM CHLORIDE 600; 310; 30; 20 MG/100ML; MG/100ML; MG/100ML; MG/100ML
INJECTION, SOLUTION INTRAVENOUS CONTINUOUS
Status: DISCONTINUED | OUTPATIENT
Start: 2017-04-13 | End: 2017-04-14 | Stop reason: HOSPADM

## 2017-04-13 RX ORDER — FENTANYL CITRATE 50 UG/ML
25-50 INJECTION, SOLUTION INTRAMUSCULAR; INTRAVENOUS
Status: DISCONTINUED | OUTPATIENT
Start: 2017-04-13 | End: 2017-04-13 | Stop reason: HOSPADM

## 2017-04-13 RX ORDER — KETOROLAC TROMETHAMINE 30 MG/ML
30 INJECTION, SOLUTION INTRAMUSCULAR; INTRAVENOUS ONCE
Status: COMPLETED | OUTPATIENT
Start: 2017-04-13 | End: 2017-04-13

## 2017-04-13 RX ORDER — IODINE AND POTASSIUM IODIDE 50; 100 MG/ML; MG/ML
LIQUID ORAL PRN
Status: DISCONTINUED | OUTPATIENT
Start: 2017-04-13 | End: 2017-04-13 | Stop reason: HOSPADM

## 2017-04-13 RX ORDER — GABAPENTIN 300 MG/1
300 CAPSULE ORAL ONCE
Status: COMPLETED | OUTPATIENT
Start: 2017-04-13 | End: 2017-04-13

## 2017-04-13 RX ORDER — LIDOCAINE 40 MG/G
CREAM TOPICAL
Status: DISCONTINUED | OUTPATIENT
Start: 2017-04-13 | End: 2017-04-13 | Stop reason: HOSPADM

## 2017-04-13 RX ORDER — IBUPROFEN 200 MG
600 TABLET ORAL
Status: DISCONTINUED | OUTPATIENT
Start: 2017-04-13 | End: 2017-04-14 | Stop reason: HOSPADM

## 2017-04-13 RX ORDER — OXYCODONE AND ACETAMINOPHEN 5; 325 MG/1; MG/1
1-2 TABLET ORAL
Status: DISCONTINUED | OUTPATIENT
Start: 2017-04-13 | End: 2017-04-14 | Stop reason: HOSPADM

## 2017-04-13 RX ORDER — OXYCODONE HYDROCHLORIDE 5 MG/1
5-10 TABLET ORAL ONCE
Status: COMPLETED | OUTPATIENT
Start: 2017-04-13 | End: 2017-04-13

## 2017-04-13 RX ORDER — ONDANSETRON 2 MG/ML
4 INJECTION INTRAMUSCULAR; INTRAVENOUS EVERY 30 MIN PRN
Status: DISCONTINUED | OUTPATIENT
Start: 2017-04-13 | End: 2017-04-14 | Stop reason: HOSPADM

## 2017-04-13 RX ORDER — OXYCODONE AND ACETAMINOPHEN 5; 325 MG/1; MG/1
1-2 TABLET ORAL EVERY 4 HOURS PRN
Qty: 10 TABLET | Refills: 0 | Status: ON HOLD | OUTPATIENT
Start: 2017-04-13 | End: 2017-05-24

## 2017-04-13 RX ORDER — DEXAMETHASONE SODIUM PHOSPHATE 4 MG/ML
INJECTION, SOLUTION INTRA-ARTICULAR; INTRALESIONAL; INTRAMUSCULAR; INTRAVENOUS; SOFT TISSUE PRN
Status: DISCONTINUED | OUTPATIENT
Start: 2017-04-13 | End: 2017-04-13

## 2017-04-13 RX ORDER — KETOROLAC TROMETHAMINE 30 MG/ML
INJECTION, SOLUTION INTRAMUSCULAR; INTRAVENOUS PRN
Status: DISCONTINUED | OUTPATIENT
Start: 2017-04-13 | End: 2017-04-13

## 2017-04-13 RX ORDER — ONDANSETRON 2 MG/ML
INJECTION INTRAMUSCULAR; INTRAVENOUS PRN
Status: DISCONTINUED | OUTPATIENT
Start: 2017-04-13 | End: 2017-04-13

## 2017-04-13 RX ORDER — SODIUM CHLORIDE, SODIUM LACTATE, POTASSIUM CHLORIDE, CALCIUM CHLORIDE 600; 310; 30; 20 MG/100ML; MG/100ML; MG/100ML; MG/100ML
INJECTION, SOLUTION INTRAVENOUS CONTINUOUS
Status: DISCONTINUED | OUTPATIENT
Start: 2017-04-13 | End: 2017-04-13 | Stop reason: HOSPADM

## 2017-04-13 RX ORDER — NALOXONE HYDROCHLORIDE 0.4 MG/ML
.1-.4 INJECTION, SOLUTION INTRAMUSCULAR; INTRAVENOUS; SUBCUTANEOUS
Status: DISCONTINUED | OUTPATIENT
Start: 2017-04-13 | End: 2017-04-14 | Stop reason: HOSPADM

## 2017-04-13 RX ORDER — ACETAMINOPHEN 325 MG/1
975 TABLET ORAL ONCE
Status: COMPLETED | OUTPATIENT
Start: 2017-04-13 | End: 2017-04-13

## 2017-04-13 RX ORDER — OXYCODONE AND ACETAMINOPHEN 10; 325 MG/1; MG/1
1-2 TABLET ORAL EVERY 4 HOURS PRN
Qty: 10 TABLET | Refills: 0 | Status: SHIPPED | OUTPATIENT
Start: 2017-04-13 | End: 2017-04-13

## 2017-04-13 RX ADMIN — FENTANYL CITRATE 50 MCG: 50 INJECTION, SOLUTION INTRAMUSCULAR; INTRAVENOUS at 08:56

## 2017-04-13 RX ADMIN — ONDANSETRON 4 MG: 2 INJECTION INTRAMUSCULAR; INTRAVENOUS at 09:00

## 2017-04-13 RX ADMIN — ACETAMINOPHEN 975 MG: 325 TABLET ORAL at 08:16

## 2017-04-13 RX ADMIN — KETOROLAC TROMETHAMINE 30 MG: 30 INJECTION, SOLUTION INTRAMUSCULAR; INTRAVENOUS at 08:16

## 2017-04-13 RX ADMIN — OXYCODONE HYDROCHLORIDE 5 MG: 5 TABLET ORAL at 10:11

## 2017-04-13 RX ADMIN — GABAPENTIN 300 MG: 300 CAPSULE ORAL at 08:16

## 2017-04-13 RX ADMIN — FENTANYL CITRATE 50 MCG: 50 INJECTION, SOLUTION INTRAMUSCULAR; INTRAVENOUS at 08:52

## 2017-04-13 RX ADMIN — SODIUM CHLORIDE, SODIUM LACTATE, POTASSIUM CHLORIDE, CALCIUM CHLORIDE: 600; 310; 30; 20 INJECTION, SOLUTION INTRAVENOUS at 08:25

## 2017-04-13 RX ADMIN — IODINE AND POTASSIUM IODIDE 14 ML: 50; 100 LIQUID ORAL at 09:34

## 2017-04-13 RX ADMIN — KETOROLAC TROMETHAMINE 30 MG: 30 INJECTION, SOLUTION INTRAMUSCULAR; INTRAVENOUS at 09:00

## 2017-04-13 RX ADMIN — BUPIVACAINE HYDROCHLORIDE 13 ML: 5 INJECTION, SOLUTION PERINEURAL at 09:32

## 2017-04-13 RX ADMIN — PROPOFOL 150 MCG/KG/MIN: 10 INJECTION, EMULSION INTRAVENOUS at 08:53

## 2017-04-13 RX ADMIN — DEXAMETHASONE SODIUM PHOSPHATE 4 MG: 4 INJECTION, SOLUTION INTRA-ARTICULAR; INTRALESIONAL; INTRAMUSCULAR; INTRAVENOUS; SOFT TISSUE at 09:00

## 2017-04-13 RX ADMIN — Medication 8 ML: at 09:33

## 2017-04-13 NOTE — DISCHARGE INSTRUCTIONS
[facility]  Home Care Following Anesthesia  For 24 hours after surgery:  1. Get plenty of rest.  A responsible adult must stay with you for at least 24 hours after you leave the surgery center.  2. Do not drive or use heavy equipment.  If you have weakness or tingling, don't drive or use heavy equipment until this feeling goes away.   3. Do not drink alcohol.   4. Avoid strenuous or risky activities.  Ask for help when climbing stairs.  5. You may feel lightheaded.  IF so, sit for a few minutes before standing.  Have someone help you get up.   6. If you have nausea (feel sick to your stomach): Drink only clear liquids such as apple juice, ginger ale, broth or 7-Up.  Rest may also help.  Be sure to drink enough fluids.  Move to a regular diet as you feel able.   7. You may have a slight fever.  Call the doctor if your fever is over 100 F (37.7 C) (taken under the tongue) or lasts longer than 24 hours.  8. You may have a dry mouth, a sore throat, muscle aches or trouble sleeping. These should go away after 24 hours.  9. Do not make important or legal decisions.               Tips for taking pain medications  To get the best pain relief possible, remember these points:    Take pain medications as directed, before pain becomes severe.    Pain medication can upset your stomach: taking it with food may help.    Constipation is a common side effect of pain medication. Drink plenty of  fluids.    Eat foods high in fiber. Take a stool softener if recommended by your doctor or pharmacist.    Do not drink alcohol, drive or operate machinery while taking pain medications.    Ask about other ways to control pain, such as with heat, ice or relaxation.    Call a doctor for any of the followin. Signs of infection (fever, growing tenderness at the surgery site, a large amount of drainage or bleeding, severe pain, foul-smelling drainage, redness, swelling).  2. It has been over 8 to 10 hours  since surgery and you are still not able to urinate (pass water).  3. Headache for over 24 hours.  4. Numbness, tingling or weakness the day after surgery (if you had spinal anesthesia).  Your doctor is:  Dr. Martha Talbert, Gynecologic Oncology: [phone]                    Or dial [phone] and ask for the resident on call for:  Gynecologic Oncology  For emergency care, call the:  [facility]:  [phone] (TTY for hearing impaired: [phone])      You have been prescribed a narcotic pain reliever that does contain Tylenol/acetaminophen.    You received 975mg of Tylenol at 815am      Be careful not to exceed 3,000 mg of Tylenol/acetaminophen in a 24 hour period.      If you are taking extra strength Tylenol/acetaminophen (500 mg), the maximum dose is 6 tablets in 24 hours.      If you are taking regular strength acetaminophen (325 mg), the maximum dose is 9 tablets in 24 hours.

## 2017-04-13 NOTE — IP AVS SNAPSHOT
MRN:[id number]                      After Visit Summary   4/13/2017    Joel Arthur    MRN: [id number]           Thank you!     Thank you for choosing [facility] for your care. Our goal is always to provide you with excellent care. Hearing back from our patients is one way we can continue to improve our services. Please take a few minutes to complete the written survey that you may receive in the mail after you visit with us. Thank you!        Patient Information     Date Of Birth          1980        About your hospital stay     You were admitted on:  April 13, 2017 [facility]    You were discharged on:  April 13, 2017       Who to Call     For medical emergencies, please call 911.  For non-urgent questions about your medical care, please call your primary care provider or clinic, [phone]  For questions related to your surgery, please call your surgery clinic        Attending Provider     Provider Specialty    Martha Talbert MD Oncology       Primary Care Provider Office Phone # Fax #    Tish Gibson [phone]       [facility] [address] [place] [address]        After Care Instructions     Discharge Instructions       Resume pre procedure diet            Discharge Instructions       Pelvic Rest. No tampons, douching or intercourse for  2 weeks.            Discharge Instructions       Patient may return to work POD  #1 if not lifting > 10 lbs            Discharge Instructions       Patient may drive beginning POD  #1 or when not taking narcotics            Discharge Instructions       Patient to arrange follow up appointment in 2 weeks            No alcohol       NO ALCOHOL for 24 hours post procedure            No driving or operating machinery       No driving or operating machinery until day after procedure            No lifting       No lifting over 10 pounds and no strenuous  physical activity.  For 2 weeks            Shower        Shower on Post-op day  #1.   DO NOT take a bath                  Further instructions from your care team       [facility]  Home Care Following Anesthesia  For 24 hours after surgery:  1. Get plenty of rest.  A responsible adult must stay with you for at least 24 hours after you leave the surgery center.  2. Do not drive or use heavy equipment.  If you have weakness or tingling, don't drive or use heavy equipment until this feeling goes away.   3. Do not drink alcohol.   4. Avoid strenuous or risky activities.  Ask for help when climbing stairs.  5. You may feel lightheaded.  IF so, sit for a few minutes before standing.  Have someone help you get up.   6. If you have nausea (feel sick to your stomach): Drink only clear liquids such as apple juice, ginger ale, broth or 7-Up.  Rest may also help.  Be sure to drink enough fluids.  Move to a regular diet as you feel able.   7. You may have a slight fever.  Call the doctor if your fever is over 100 F (37.7 C) (taken under the tongue) or lasts longer than 24 hours.  8. You may have a dry mouth, a sore throat, muscle aches or trouble sleeping. These should go away after 24 hours.  9. Do not make important or legal decisions.               Tips for taking pain medications  To get the best pain relief possible, remember these points:    Take pain medications as directed, before pain becomes severe.    Pain medication can upset your stomach: taking it with food may help.    Constipation is a common side effect of pain medication. Drink plenty of  fluids.    Eat foods high in fiber. Take a stool softener if recommended by your doctor or pharmacist.    Do not drink alcohol, drive or operate machinery while taking pain medications.    Ask about other ways to control pain, such as with heat, ice or relaxation.    Call a doctor for any of the followin. Signs of infection (fever,  "growing tenderness at the surgery site, a large amount of drainage or bleeding, severe pain, foul-smelling drainage, redness, swelling).  2. It has been over 8 to 10 hours since surgery and you are still not able to urinate (pass water).  3. Headache for over 24 hours.  4. Numbness, tingling or weakness the day after surgery (if you had spinal anesthesia).  Your doctor is:  Dr. Martha Talbert, Gynecologic Oncology: [phone]                    Or dial [phone] and ask for the resident on call for:  Gynecologic Oncology  For emergency care, call the:  [facility]:  [phone] (TTY for hearing impaired: [phone])      You have been prescribed a narcotic pain reliever that does contain Tylenol/acetaminophen.    You received 975mg of Tylenol at 815am      Be careful not to exceed 3,000 mg of Tylenol/acetaminophen in a 24 hour period.      If you are taking extra strength Tylenol/acetaminophen (500 mg), the maximum dose is 6 tablets in 24 hours.      If you are taking regular strength acetaminophen (325 mg), the maximum dose is 9 tablets in 24 hours.                    Pending Results     Date and Time Order Name Status Description    4/13/2017 0923 Surgical pathology exam In process     4/11/2017 1047 PATHOLOGY CONSULT In process             Admission Information     Date & Time Provider Department Dept. Phone    4/13/2017 Martha Talbert MD [facility] [phone]      Your Vitals Were     Blood Pressure Pulse Temperature Respirations Height Weight    101/67 66 97.5  F (36.4  C) (Oral) 16 1.575 m (5' 2\") 60.8 kg (134 lb)    Last Period Pulse Oximetry BMI (Body Mass Index)             04/03/2017 (Approximate) 100% 24.51 kg/m2         [employer] Information     [employer] gives you secure access to your electronic health record. If you see a primary care provider, you can also send messages to your care team and make appointments. If you have questions, please " call your primary care clinic.  If you do not have a primary care provider, please call [phone] and they will assist you.      [employer] is an electronic gateway that provides easy, online access to your medical records. With [employer], you can request a clinic appointment, read your test results, renew a prescription or communicate with your care team.     To access your existing account, please contact your [facility] or call [phone] for assistance.        Care EveryWhere ID     This is your Care EveryWhere ID. This could be used by other organizations to access your [facility] medical records  [id number]           Review of your medicines      START taking        Dose / Directions    oxyCODONE-acetaminophen 5-325 MG per tablet   Commonly known as:  PERCOCET   Used for:  Adenocarcinoma of cervix (H), Post-op pain        Dose:  1-2 tablet   Take 1-2 tablets by mouth every 4 hours as needed for pain (moderate to severe)   Quantity:  10 tablet   Refills:  0         CONTINUE these medicines which have NOT CHANGED        Dose / Directions    LORazepam 0.5 MG tablet   Commonly known as:  ATIVAN   Used for:  Adjustment disorder with anxious mood        Dose:  0.5 mg   Take 1 tablet (0.5 mg) by mouth every 8 hours as needed for anxiety   Quantity:  30 tablet   Refills:  1       WOMENS MULTI VITAMIN & MINERAL PO        Take by mouth daily   Refills:  0            Where to get your medicines      Some of these will need a paper prescription and others can be bought over the counter. Ask your nurse if you have questions.     Bring a paper prescription for each of these medications     oxyCODONE-acetaminophen 5-325 MG per tablet                Protect others around you: Learn how to safely use, store and throw away your medicines at www.disposemymeds.org.             Medication List: This is a list of all your medications and when to take them. Check marks below indicate your daily home  schedule. Keep this list as a reference.      Medications           Morning Afternoon Evening Bedtime As Needed    LORazepam 0.5 MG tablet   Commonly known as:  ATIVAN   Take 1 tablet (0.5 mg) by mouth every 8 hours as needed for anxiety                                oxyCODONE-acetaminophen 5-325 MG per tablet   Commonly known as:  PERCOCET   Take 1-2 tablets by mouth every 4 hours as needed for pain (moderate to severe)                                WOMENS MULTI VITAMIN & MINERAL PO   Take by mouth daily

## 2017-04-13 NOTE — OP NOTE
IDENTIFICATION:  Joel Arthur is a 36-year-old  female who presented to me in consultation regarding recent cervical biopsies that showed cervical adenocarcinoma, intestinal type, from multiple biopsies.  Her Pap smear from prior to this showed an GERONIMO.  In light of this, I recommended that she undergo cold knife conization to determine how invasive the disease was and to decide on whether she will require radical hysterectomy versus extrafascial hysterectomy.  The patient is done childbearing.      She also had an IUD in place and understood that removal would be required for this procedure.  In addition, she was found to have decreased pigmentation around the periclitoral area that caused itching and thus we decided to perform a biopsy at the time of surgery.      The risks of the procedure, including risks of infection, bleeding, damage to surrounding organs were explained.  Informed consent was obtained.  She was taken to the operating room on 04/13/2017.      PREOPERATIVE DIAGNOSIS:  Adenocarcinoma of cervix, unclear depth of invasion, periclitoral itching.      POSTOPERATIVE DIAGNOSIS:  Adenocarcinoma of cervix, unclear depth of invasion, periclitoral itching.      PROCEDURE:  Exam under anesthesia, colposcopy of the cervix, left periclitoral biopsy, cold knife conization of cervix, removal of Mirena IUD, endocervical curettage.      SURGEON:  Martha Talbert MD      ASSISTANTS:  None.      ANESTHESIA:  MAC.      ESTIMATED BLOOD LOSS:  Less than 50 mL.      IV FLUIDS:  See anesthesia record.      BLOOD TRANSFUSIONS:  None.      TOTAL URINE OUTPUT:  Approximately 100 mL at the beginning of the procedure.      DRAINS:  None.      SPECIMENS:  Cold knife cone of the cervix, periclitoral biopsy on the left, ECC.      FINDINGS:  Cervix multiparous, nonstaining areas from 9 to 1 o'clock following application of Lugol iodine, IUD removed intact, periclitoral area with decreased pigmentation.       COMPLICATIONS:  None.      CONDITION:  Stable to PACU.      PROCEDURE IN DETAIL:  The patient was taken to the operating room with IV fluids running where she was placed in the supine position and administered MAC anesthesia.  She was then placed in dorsal lithotomy position with her legs in [place].  She was prepped and draped in the usual sterile fashion.  A timeout was called.  The patient was then straight catheterized for 100 cc of clear urine.  A sterile speculum was placed in the vagina.  The cervix was grasped with a single-tooth tenaculum and Lugol iodine applied throughout the cervix.  A colposcopy was performed with findings as noted above.  I then injected 1% bupivacaine locally into the four quadrants of the cervix, approximately 10 cc in total.  Then 2 stay sutures of 2-0 Vicryl were placed on the lateral aspect of the cervix to allow for stabilization.  I then proceeded with the cold knife conization of the cervix using a scalpel.  The entire area of nonstaining was removed during this portion of the procedure.  Following removal of the cone, there was bleeding from the base of the bed and ECC was performed and tissue was handed off to pathology.  Cauterization was then used to dry up the base of the cone.  A piece of Gelfoam was then placed into the cervix with Monsel to assist in hemostasis.  The stay sutures were removed and hemostasis was assured.  Speculum was removed from the vagina.  The left periclitoral decreased pigmented area was then injected with 1% bupivacaine and a Tischler was used to take a small biopsy from the left periclitoral area.  Hemostasis was assured using Bovie cautery.      At that point, the patient was awakened from MAC anesthesia and taken to PACU in stable condition.  All sponge and needle counts were reported as correct to me x2.         MD Martha RODRIGUEZ MD    Department of Ob/Gyn and Women's Health  Division of  Gynecologic Oncology  [facility]  [phone]    I was scrubbed and present for the entire procedure.          D: 2017 10:08   T: 2017 15:32   MT: oracio      Name:     RYAN KISER   MRN:      [id number]        Account:        [id number]   :      1980           Procedure Date: 2017      Document: [id number]

## 2017-04-13 NOTE — ANESTHESIA CARE TRANSFER NOTE
Patient: Joel Arthur    Procedure(s):  Exam Under Anesthesia, Colposcopy, Cold Knife Cone Biopsy, Periclitoral Biopsy, Removal of Mirena Intrauterine Device, Endocervical Curretage - Wound Class: II-Clean Contaminated   - Wound Class: II-Clean Contaminated   - Wound Class: II-Clean Contaminated    Diagnosis: Adenocarcinoma of Cervix  Diagnosis Additional Information: No value filed.    Anesthesia Type:   General, LMA     Note:  Airway :Room Air  Patient transferred to:Phase II        Vitals: (Last set prior to Anesthesia Care Transfer)    CRNA VITALS  4/13/2017 0909 - 4/13/2017 0943      4/13/2017             Ht Rate: 81    Resp Rate (set): 10                Electronically Signed By: LUX Serrano CRNA  April 13, 2017  9:43 AM

## 2017-04-13 NOTE — ANESTHESIA POSTPROCEDURE EVALUATION
Patient: Joel Arthur    Procedure(s):  Exam Under Anesthesia, Colposcopy, Cold Knife Cone Biopsy, Periclitoral Biopsy, Removal of Mirena Intrauterine Device, Endocervical Curretage - Wound Class: II-Clean Contaminated   - Wound Class: II-Clean Contaminated   - Wound Class: II-Clean Contaminated    Diagnosis:Adenocarcinoma of Cervix  Diagnosis Additional Information: No value filed.    Anesthesia Type:  General, LMA    Note:  Anesthesia Post Evaluation    Patient location during evaluation: Phase 2  Patient participation: Able to fully participate in evaluation  Level of consciousness: awake and alert  Pain management: adequate  Airway patency: patent  Cardiovascular status: acceptable and hemodynamically stable  Respiratory status: acceptable  Hydration status: acceptable  PONV: none     Anesthetic complications: None          Last vitals:  Vitals:    04/13/17 0725 04/13/17 0945   BP: 111/76 100/53   Pulse: 66    Resp: 16 16   Temp: 36.4  C (97.6  F) 36.4  C (97.5  F)   SpO2: 100% 100%         Electronically Signed By: Kameron Ling MD  April 13, 2017  9:54 AM

## 2017-04-13 NOTE — ANESTHESIA PREPROCEDURE EVALUATION
Anesthesia Evaluation     . Pt has had prior anesthetic. Type: MAC and Regional    No history of anesthetic complications          ROS/MED HX    ENT/Pulmonary:  - neg pulmonary ROS     Neurologic:  - neg neurologic ROS     Cardiovascular:  - neg cardiovascular ROS   (+) ----. : . . . :. . No previous cardiac testing       METS/Exercise Tolerance:  >4 METS   Hematologic:  - neg hematologic  ROS       Musculoskeletal:  - neg musculoskeletal ROS       GI/Hepatic:  - neg GI/hepatic ROS       Renal/Genitourinary:  - ROS Renal section negative       Endo:  - neg endo ROS       Psychiatric:     (+) psychiatric history anxiety      Infectious Disease:  - neg infectious disease ROS       Malignancy:      - no malignancy   Other:    - neg other ROS                 Physical Exam  Normal systems: pulmonary and dental    Airway   Mallampati: II  TM distance: >3 FB  Neck ROM: full    Dental     Cardiovascular   Rhythm and rate: regular and normal      Pulmonary                        Lab / Radiology Results:   Reviewed current labs when avail, see EMR for details.      BMP:  Recent Labs   Lab Test  04/13/17   0752   POTASSIUM  4.0       LFTs:   No results for input(s): PROTTOTAL, ALBUMIN, BILITOTAL, ALKPHOS, AST, ALT, BILIDIRECT in the last 76983 hours.    CBC:   Recent Labs   Lab Test  04/13/17   0752  11/18/14   1245   WBC   --   7.9   HGB  13.0  13.8   PLT   --   216       Coags:  No results for input(s): INR, PTT, FIBR in the last 78681 hours.    Blood Bank:  Lab Results   Component Value Date    ABO B 07/09/2013    RH  Pos 07/09/2013    AS Neg 07/09/2013       Studies:  See EMR for current studies, reviewed when available.      Anesthesia Plan      History & Physical Review  History and physical reviewed and following examination; no interval change.    ASA Status:  1 .    NPO Status:  > 2 hours (Gatorade at 0600.  )    Plan for MAC with Intravenous induction. Maintenance will be TIVA.    PONV prophylaxis:  Ondansetron (or  other 5HT-3) and Dexamethasone or Solumedrol       Postoperative Care  Postoperative pain management:  Multi-modal analgesia.      Consents  Anesthetic plan, risks, benefits and alternatives discussed with:  Patient.  Use of blood products discussed: No .   .      Kameron Ling MD  Anesthesiologist  8:27 AM  April 13, 2017                        .

## 2017-04-13 NOTE — BRIEF OP NOTE
[facility] Gynecology Brief Operative Note    Pre-operative diagnosis: Adenocarcinoma of the cervix, periclitoral itching   Post-operative diagnosis: Same   Procedure: EUA, colposcopy, left periclitoral biopsy, CKC of cervix, removal of mirena IUD, endocervical curettage     Surgeon: Martha Talbert MD   Assistant(s): None   Anesthesia: MAC (monitored anesthesia care)   Estimated blood loss: less than 50ml   Total IV fluids: (See anesthesia record)   Blood transfusion: No transfusion was given during surgery   Total urine output: (See anesthesia record)  Not measured   Drains: None   Specimens: Cold knife cone, periclitoral biopsy, ECC   Findings: Cervix multiparous, non staining area from 9-1 oclock following lugol's iodine, IUD removed, periclitoral area with decreased pigmentation.   Complications: None   Condition: Stable   Comments: See dictated operative report for full details    Martha Talbert MD    Department of Ob/Gyn and Women's Health  Division of Gynecologic Oncology  [facility]  [phone]

## 2017-04-13 NOTE — IP AVS SNAPSHOT
[facility]    [address] [place] [id number]    Phone:  [phone]    Fax:  [phone]                                       After Visit Summary   4/13/2017    Joel Arthur    MRN: [id number]           After Visit Summary Signature Page     I have received my discharge instructions, and my questions have been answered. I have discussed any challenges I see with this plan with the nurse or doctor.    ..........................................................................................................................................  Patient/Patient Representative Signature      ..........................................................................................................................................  Patient Representative Print Name and Relationship to Patient    ..................................................               ................................................  Date                                            Time    ..........................................................................................................................................  Reviewed by Signature/Title    ...................................................              ..............................................  Date                                                            Time

## 2017-04-14 ENCOUNTER — CARE COORDINATION (OUTPATIENT)
Dept: ONCOLOGY | Facility: CLINIC | Age: 37
End: 2017-04-14

## 2017-04-14 LAB
COPATH REPORT: NORMAL
COPATH REPORT: NORMAL

## 2017-04-14 NOTE — PROGRESS NOTES
Post-Discharge Phone Call:    Pain:  1) Location: vaginal  2) Rate pain on scale 1-10: 2/10  3) Is your pain well controlled on your pain medication?: Yes  4) How often are you taking your pain medication?: prn advil;    GI:  5) Last bowel movement: today  6) Are you having regular bowel movements? Yes  7) Eating/drinking well?: Yes  8) Nausea?: Yes  Slight but resolving  Urinary:  9) Are you having problems or difficulty with urination? No      Lower Extremities:  10) Were lymph nodes removed during surgery?  N/A  11) If yes, have you been offered a lymphedema consult appointment?  N/A  12) Any pain, redness, or swelling in legs?  No  13) Any area on your legs that are warm to touch? No  14) Chest pain or severe shortness of breath? No    Wound:  15) Type of incision: vagina  Any of the following:  - Drainage (color, amount): Yes  Slight/spottng  - Odor: No  - Redness: No  - Chills: No  - Fever: No  16) Staples - Have you had your staples out yet? (staples should be removed 7-10 days post-op): N/A  17) Pt was reminded to wash incision (allow warm, soapy water to run over incision): Yes    Post-op:  18) Verify date and time of appointment: 4/27  19) Pt was informed that pathology will be discussed at this appointment Yes    Any other questions or concerns at this time? No

## 2017-04-16 ASSESSMENT — ENCOUNTER SYMPTOMS
INCREASED ENERGY: 0
DECREASED CONCENTRATION: 0
FATIGUE: 1
NERVOUS/ANXIOUS: 1
POLYDIPSIA: 0
PANIC: 1
WEIGHT GAIN: 0
NIGHT SWEATS: 1
POLYPHAGIA: 0
DECREASED APPETITE: 0
DEPRESSION: 0
INSOMNIA: 1
FEVER: 0
WEIGHT LOSS: 0
ALTERED TEMPERATURE REGULATION: 1
CHILLS: 1
HALLUCINATIONS: 0

## 2017-04-20 ENCOUNTER — TELEPHONE (OUTPATIENT)
Dept: FAMILY MEDICINE | Facility: CLINIC | Age: 37
End: 2017-04-20

## 2017-04-20 ENCOUNTER — TELEPHONE (OUTPATIENT)
Dept: ONCOLOGY | Facility: CLINIC | Age: 37
End: 2017-04-20

## 2017-04-20 NOTE — TELEPHONE ENCOUNTER
Pt called. States she is having bright red bleeding today in small to moderate amounts. Nothing heavy. Advised to continue to monitor and rest. If bleeding increased advised to go to ED for further evaluation. Pt offered and declined appointment for tomorrow. Bettie Francis RN

## 2017-04-20 NOTE — TELEPHONE ENCOUNTER
Called patient with pathology report. Recommend hysterectomy due to multifocal AIS and completion of childbearing. Recommend DaVinci TLH, bilateral salpingectomy. Wants to get date for surgery.     Martha Talbert MD    Department of Ob/Gyn and Women's Health  Division of Gynecologic Oncology  [facility]  [phone]

## 2017-04-20 NOTE — TELEPHONE ENCOUNTER
Reason for call:  Patient reporting a symptom    Symptom or request: Bleeding post Colposcopy Conization at the [facility]    Duration (how long have symptoms been present): Pt has been spotting since the Conization. Starting a hr abo she has been bleeding more. Pt was told to F/U with  if starts bleeding    Phone Number patient can be reached at:  Home number on file [phone] (home)    Best Time:  Any Time    Call taken on 4/20/2017 at 12:55 PM by Indy Whiteside

## 2017-04-27 ENCOUNTER — OFFICE VISIT (OUTPATIENT)
Dept: ONCOLOGY | Facility: CLINIC | Age: 37
End: 2017-04-27
Attending: OBSTETRICS & GYNECOLOGY
Payer: COMMERCIAL

## 2017-04-27 VITALS
SYSTOLIC BLOOD PRESSURE: 105 MMHG | HEART RATE: 71 BPM | RESPIRATION RATE: 16 BRPM | OXYGEN SATURATION: 99 % | HEIGHT: 62 IN | WEIGHT: 134.8 LBS | DIASTOLIC BLOOD PRESSURE: 69 MMHG | BODY MASS INDEX: 24.8 KG/M2 | TEMPERATURE: 97.8 F

## 2017-04-27 DIAGNOSIS — Z01.818 PREOPERATIVE EXAMINATION: ICD-10-CM

## 2017-04-27 DIAGNOSIS — D06.9 ADENOCARCINOMA IN SITU (AIS) OF UTERINE CERVIX: Primary | ICD-10-CM

## 2017-04-27 DIAGNOSIS — L90.0 LICHEN SCLEROSUS ET ATROPHICUS: ICD-10-CM

## 2017-04-27 PROCEDURE — 99214 OFFICE O/P EST MOD 30 MIN: CPT | Mod: ZP | Performed by: OBSTETRICS & GYNECOLOGY

## 2017-04-27 PROCEDURE — 99212 OFFICE O/P EST SF 10 MIN: CPT | Mod: ZF

## 2017-04-27 RX ORDER — CLOBETASOL PROPIONATE 0.5 MG/G
CREAM TOPICAL
Qty: 60 G | Refills: 0 | Status: SHIPPED | OUTPATIENT
Start: 2017-04-27 | End: 2019-02-28

## 2017-04-27 RX ORDER — ACETAMINOPHEN 325 MG/1
975 TABLET ORAL ONCE
Status: CANCELLED | OUTPATIENT
Start: 2017-04-27 | End: 2017-04-27

## 2017-04-27 RX ORDER — PHENAZOPYRIDINE HYDROCHLORIDE 200 MG/1
200 TABLET, FILM COATED ORAL ONCE
Status: CANCELLED | OUTPATIENT
Start: 2017-04-27 | End: 2017-04-27

## 2017-04-27 ASSESSMENT — PAIN SCALES - GENERAL: PAINLEVEL: NO PAIN (0)

## 2017-04-27 NOTE — PROGRESS NOTES
Consult Notes on Referred Patient    Date: 2017       Dr. Tish Gibson MD  [facility]  [address] [place] [address]       RE: Joel Arthur  : 1980  AJIT: 2017    Dear Dr. Tish Gibson:    I had the pleasure of seeing your patient Joel Arthur here at the Gynecologic Cancer Clinic at the [facility] on 2017.  As you know she is a very pleasant 36 year old woman with a recent diagnosis of adenocarcinoma in situ, PEDRO I.  Given these findings she was subsequently sent to the Gynecologic Cancer Clinic for new patient consultation.     HPI:    Lab Results   Component Value Date    PAP ATYP 2017    PAP OTHER-NIL, See Result 2016    PAP NIL 2013       Pap atypical glandular cells  17: HPV 18+    3/20/17  SPECIMEN(S):   A: Endocervical curettings   B: Cervical biopsy, 1-2 o'clock   C: Cervical biopsy, 3 o'clock   D: Cervical biopsy, 10 o'clock     FINAL DIAGNOSIS:   A. Cervix, endocervical curettings:   - Cervical adenocarcinoma, intestinal type - see description.     B. Cervix, 1-2 o'clock, biopsy:   - Cervical adenocarcinoma, intestinal type - see description.     C. Cervix, 3 o'clock, biopsy:   - Cervical glandular and squamous epithelium with no evidence of   dysplasia or malignancy.   - Moderate chronic inflammation with reactive epithelial changes.     D. Cervix, 10 o'clock, biopsy:   - Cervical glandular and squamous epithelium with no evidence of   dysplasia or malignancy.     17: cold knife cone biopsy with colposcopy   Final pathology:  SPECIMEN(S):   A: Cervical cone biopsy   B: Endocervical curettings   C: Periclitoral biopsy     FINAL DIAGNOSIS:   A. CERVIX, COLD KNIFE CONE BIOPSY:   - Adenocarcinoma in situ, intestinal type   - Low grade squamous intraepithelial lesion (cervical intraepithelial   neoplasia 1)   - Surgical margins negative for adenocarcinoma in-situ and squamous    intraepithelial lesion   - See comment     B. ENDOMETRIUM, CURETTAGE:   - Proliferative endometrium negative for hyperplasia or atypia   - Cervical tissue with inflammatory and reactive change     C. VULVA, UDAY-CLITORIS, BIOPSY:   - Lichen sclerosus   - Negative for dysplasia       Today: Joel presents to clinic today feeling well. She is s/p cold knife cone with colposcopy. She reports heavy bleeding occurring after the procedure which just ended on Tuesday, some occasional spotting yesterday and today. She reports no heavy lifting other than lifting her daughter in to her care seat which could have caused the bleeding. Denies shortness of breath, cough, chest pain, abdominal pain, dyspepsia, nausea, vomiting, constipation, diarrhea, dysuria, urinary frequency or urgency, hematuria, pelvic pain, lower back pain, or abnormal vaginal discharge. Pathology results were discussed with patient. Recommended Davinci assisted TLH-BSO due to multifocal AIS and completion of childbearing. Explained she will need yearly paps due to HPV+ and hx of AIS which she will get at general OB[facility]. She complains of some vulvar itching most likely due to her lichen sclerosis. I will order Clobetasol for her to use when needed. Instructed pt on how to use topical medication.     Review of Systems:  Answers for HPI/ROS submitted by the patient on 4/16/2017   General Symptoms: Yes  Skin Symptoms: No  HENT Symptoms: No  EYE SYMPTOMS: No  HEART SYMPTOMS: No  LUNG SYMPTOMS: No  INTESTINAL SYMPTOMS: No  URINARY SYMPTOMS: No  GYNECOLOGIC SYMPTOMS: No  BREAST SYMPTOMS: No  SKELETAL SYMPTOMS: No  BLOOD SYMPTOMS: No  NERVOUS SYSTEM SYMPTOMS: No  MENTAL HEALTH SYMPTOMS: Yes  Fever: No  Loss of appetite: No  Weight loss: No  Weight gain: No  Fatigue: Yes  Night sweats: Yes  Chills: Yes  Increased stress: Yes  Excessive hunger: No  Excessive thirst: No  Feeling hot or cold when others believe the temperature is normal: Yes  Loss of height:  No  Post-operative complications: No  Surgical site pain: No  Hallucinations: No  Change in or Loss of Energy: No  Hyperactivity: No  Confusion: No  Nervous or Anxious: Yes  Depression: No  Trouble sleeping: Yes  Trouble thinking or concentrating: No  Mood changes: Yes  Panic attacks: Yes      I have reviewed and addressed the patient's review of symptoms for today's visit.     Past Medical History:    Past Medical History:   Diagnosis Date     Atypical glandular cells of undetermined significance (GERONIMO) on cervical Pap smear 02/17/2017    + HR HPV 18     Cervical high risk HPV (human papillomavirus) test positive 1/18/16    type 18     Chickenpox      H/O colposcopy with cervical biopsy 03/20/2017    Bx & ECC - cervical adenocarcinoma       Past Surgical History:    Past Surgical History:   Procedure Laterality Date     BIOPSY VULVA N/A 4/13/2017    Procedure: BIOPSY VULVA;  Surgeon: Martha Talbert MD;  Location:  OR     COLPOSCOPY, CONIZATION, COMBINED N/A 4/13/2017    Procedure: COMBINED COLPOSCOPY, CONIZATION;  Surgeon: Martha Talbert MD;  Location:  OR     MOUTH SURGERY      wisdom teeth     REMOVE INTRAUTERINE DEVICE N/A 4/13/2017    Procedure: REMOVE INTRAUTERINE DEVICE;  Surgeon: Martha Talbert MD;  Location:  OR         Health Maintenance:  There are no preventive care reminders to display for this patient.    Last Pap Smear: 2/17/17              Result: abnormal: atypical glandular cells  She has not had a history of abnormal Pap smears.    Last Mammogram: none              Result: not examined                            Current Medications:     has a current medication list which includes the following prescription(s): oxycodone-acetaminophen, lorazepam, and multiple vitamins-minerals.       Allergies:     Allergies   Allergen Reactions     Diflucan [Fluconazole] Itching and Swelling     Tylenol W/Codeine [Acetaminophen-Codeine] Nausea and Vomiting            Social History:    "  Social History   Substance Use Topics     Smoking status: Former Smoker     Types: Cigarettes     Quit date: 1/1/2016     Smokeless tobacco: Never Used      Comment: quit 1/1/10     Alcohol use 0.0 oz/week     0 Standard drinks or equivalent per week      Comment: occas - 5-6 beers 2 times weekly       History   Drug Use No           Family History:     The patient's family history is notable for non.    Family History   Problem Relation Age of Onset     Thyroid Disease Mother      Lipids Mother      Depression Mother      HEART DISEASE Maternal Grandmother      MI     CANCER Maternal Grandfather      kidney         Physical Exam:     /69 (BP Location: Left arm, Patient Position: Chair, Cuff Size: Adult Regular)  Pulse 71  Temp 97.8  F (36.6  C) (Oral)  Resp 16  Ht 1.575 m (5' 2.01\")  Wt 61.1 kg (134 lb 12.8 oz)  LMP 04/03/2017 (Approximate)  SpO2 99%  BMI 24.65 kg/m2  Body mass index is 24.65 kg/(m^2).    General Appearance: healthy and alert, no distress     HEENT:  no thyromegaly, no palpable nodules or masses        Cardiovascular: regular rate and rhythm, no gallops, rubs or murmurs     Respiratory: lungs clear, no rales, rhonchi or wheezes, normal diaphragmatic excursion    Musculoskeletal: extremities non tender and without edema    Skin: no lesions or rashes     Neurological: normal gait, no gross defects     Psychiatric: appropriate mood and affect                               Hematological: normal cervical, supraclavicular and inguinal lymph nodes     Gastrointestinal:       abdomen soft, non-tender, non-distended, no organomegaly or masses    Genitourinary: Deferred      Assessment:    Joel Arthur is a 36 year old woman with a new diagnosis of multifocal adenocarcinoma in situ, PEDRO I.     Of a 30 minute appointment, more than 50% was spent in counseling the patient.    Plan:     1.)     Based on multifocal AIS would recommend DaVinci assisted total laparoscopic hysterectomy, bilateral " salpingectomy, possible open abdomen. Will leave ovaries if normal. Written and verbal consent obtained from patient. Patient does not desire further childbearing.      2.) Genetic risk factors were assessed and the patient does not meet the qualifications for a referral.      3.) Labs and/or tests ordered include:  none.     4.) Health maintenance issues addressed today include none.    5.) Pre-op teaching was completed today.  Risks of surgery were discussed to include: bleeding, transfusion, infection, unintentional injury to surrounding organs/structures.    6.) Lichen sclerosis: clobetasol ordered - [facility]       Thank you for allowing us to participate in the care of your patient.         Sincerely,    Martha Talbert MD    Department of Ob/Gyn and Women's Health  Division of Gynecologic Oncology  [facility]  [phone]      CC  Patient Care Team:  Danette Clark MD as PCP - General (Family Practice)  DANETTE CLARK    I have reviewed the above note and agree with the scribe's notation as written.    I, Jak Mills, am serving as a scribe to document services personally performed by Martha Talbert MD, based upon my observations and the provider's statements to me. All documentation has been reviewed by the aforementioned doctor prior to being entered into the official medical record.

## 2017-04-27 NOTE — NURSING NOTE
Pre Op Nurse Teaching Template    Relevant Diagnosis: ais of cervix    Teaching Topic:  davinci assisted removal of uterus cervix tubes  Possible open abdomen cystoscopy    Person(s) involved in teaching :  Patient    Motivation Level:  Asks Questions:    Yes      Eager to Learn:     Yes     Cooperative:          Yes    Receptive (willing. Able to accept information):    Yes      Patient and those who are listed above demonstrates understanding of the following:   Reason for the appointment, diagnosis and treatment plan:   Yes   Knowledge of proper use of medications and conditions for which they are ordered (with special attention to potential side effects or drug interactions): Yes   Which situations necessitate calling provider and whom to contact: Yes         Nutritional needs and diet plan:  Yes      Pain management techniques:     Yes, Pain Scale   Diet:   Yes, [facility] Diet Instructions    Teaching Concerns addressed: Yes    Infection Prevention:  Patient and those who are listed above demonstrate understanding of the following:  Pre-Op CHG Bathing Instructions: Yes  Surgical procedure site care taught:   Yes   Signs and symptoms of infection taught: Yes       Instructional Materials Used/Given:  The [facility] Before You Surgery Booklet  Showering or Bathing before Surgery Instructions & CHG Product  Hysterectomy Guidelines  Pain Assessment Tool   Home Care after Major Abdominal or Vaginal Surgery  Map  Accommodations Brochure  Phone numbers for [facility] and [facility]  Copy of Surgical Consent    Done Today:    Preop Visit needed/not needed   Tests Ordered:Lab work am of OR  Post Op Visit Scheduled:  6/15  Comments:    Surgery date/time:5/24    Consent sent to file in medical records    .

## 2017-04-27 NOTE — LETTER
2017       RE: Joel Arthur  [address] [facility] [address]     Dear Colleague,    Thank you for referring your patient, Joel Arthur, to the [facility]. Please see a copy of my visit note below.                            Consult Notes on Referred Patient    Date: 2017       Dr. Tish Gibson MD  [facility]  [address] [place] [address]       RE: Joel Arthur  : 1980  AJIT: 2017    Dear Dr. Tish Gibson:    I had the pleasure of seeing your patient Joel Arthur here at the Gynecologic Cancer Clinic at the [facility] on 2017.  As you know she is a very pleasant 36 year old woman with a recent diagnosis of adenocarcinoma of the cervix.  Given these findings she was subsequently sent to the Gynecologic Cancer Clinic for new patient consultation.     HPI:    Lab Results   Component Value Date    PAP ATYP 2017    PAP OTHER-NIL, See Result 2016    PAP NIL 2013       Pap atypical glandular cells  17: HPV 18+    3/20/17  SPECIMEN(S):   A: Endocervical curettings   B: Cervical biopsy, 1-2 o'clock   C: Cervical biopsy, 3 o'clock   D: Cervical biopsy, 10 o'clock     FINAL DIAGNOSIS:   A. Cervix, endocervical curettings:   - Cervical adenocarcinoma, intestinal type - see description.     B. Cervix, 1-2 o'clock, biopsy:   - Cervical adenocarcinoma, intestinal type - see description.     C. Cervix, 3 o'clock, biopsy:   - Cervical glandular and squamous epithelium with no evidence of   dysplasia or malignancy.   - Moderate chronic inflammation with reactive epithelial changes.     D. Cervix, 10 o'clock, biopsy:   - Cervical glandular and squamous epithelium with no evidence of   dysplasia or malignancy.     17: cold knife cone biopsy with colposcopy   Final pathology:  SPECIMEN(S):   A: Cervical cone biopsy   B: Endocervical curettings   C: Periclitoral biopsy     FINAL DIAGNOSIS:   A. CERVIX, COLD  KNIFE CONE BIOPSY:   - Adenocarcinoma in situ, intestinal type   - Low grade squamous intraepithelial lesion (cervical intraepithelial   neoplasia 1)   - Surgical margins negative for adenocarcinoma in-situ and squamous   intraepithelial lesion   - See comment     B. ENDOMETRIUM, CURETTAGE:   - Proliferative endometrium negative for hyperplasia or atypia   - Cervical tissue with inflammatory and reactive change     C. VULVA, UDAY-CLITORIS, BIOPSY:   - Lichen sclerosus   - Negative for dysplasia       Today: Joel presents to clinic today feeling well. She is s/p cold knife cone with colposcopy. She reports heavy bleeding occurring after the procedure which just ended on Tuesday, some occasional spotting yesterday and today. She reports no heavy lifting other than lifting her daughter in to her care seat which could have caused the bleeding. Pathology results were discussed with patient. Recommended Davinci assisted TLH-BSO due to multifocal AIS and completion of childbearing. Explained she will need yearly paps due to HPV+ which she will get at general OB[facility]GYN. She complains of some vulvar itching most likely due to her lichen sclerosis. I will order Clobetasol for her to use when needed. Instructed pt on how to use topical medication.     Review of Systems:    Answers for HPI/ROS submitted by the patient on 4/16/2017   General Symptoms: Yes  Skin Symptoms: No  HENT Symptoms: No  EYE SYMPTOMS: No  HEART SYMPTOMS: No  LUNG SYMPTOMS: No  INTESTINAL SYMPTOMS: No  URINARY SYMPTOMS: No  GYNECOLOGIC SYMPTOMS: No  BREAST SYMPTOMS: No  SKELETAL SYMPTOMS: No  BLOOD SYMPTOMS: No  NERVOUS SYSTEM SYMPTOMS: No  MENTAL HEALTH SYMPTOMS: Yes  Fever: No  Loss of appetite: No  Weight loss: No  Weight gain: No  Fatigue: Yes  Night sweats: Yes  Chills: Yes  Increased stress: Yes  Excessive hunger: No  Excessive thirst: No  Feeling hot or cold when others believe the temperature is normal: Yes  Loss of height: No  Post-operative  complications: No  Surgical site pain: No  Hallucinations: No  Change in or Loss of Energy: No  Hyperactivity: No  Confusion: No  Nervous or Anxious: Yes  Depression: No  Trouble sleeping: Yes  Trouble thinking or concentrating: No  Mood changes: Yes  Panic attacks: Yes      I have reviewed and addressed the patient's review of symptoms for today's visit.     Past Medical History:    Past Medical History:   Diagnosis Date     Atypical glandular cells of undetermined significance (GERONIMO) on cervical Pap smear 02/17/2017    + HR HPV 18     Cervical high risk HPV (human papillomavirus) test positive 1/18/16    type 18     Chickenpox      H/O colposcopy with cervical biopsy 03/20/2017    Bx & ECC - cervical adenocarcinoma       Past Surgical History:    Past Surgical History:   Procedure Laterality Date     BIOPSY VULVA N/A 4/13/2017    Procedure: BIOPSY VULVA;  Surgeon: Martha Talbert MD;  Location:  OR     COLPOSCOPY, CONIZATION, COMBINED N/A 4/13/2017    Procedure: COMBINED COLPOSCOPY, CONIZATION;  Surgeon: Martha Talbert MD;  Location:  OR     MOUTH SURGERY      wisdom teeth     REMOVE INTRAUTERINE DEVICE N/A 4/13/2017    Procedure: REMOVE INTRAUTERINE DEVICE;  Surgeon: Martha Talbert MD;  Location:  OR         Health Maintenance:  There are no preventive care reminders to display for this patient.    Last Pap Smear: 2/17/17              Result: abnormal: atypical glandular cells  She has not had a history of abnormal Pap smears.    Last Mammogram: none              Result: not examined                            Current Medications:     has a current medication list which includes the following prescription(s): oxycodone-acetaminophen, lorazepam, and multiple vitamins-minerals.       Allergies:     Allergies   Allergen Reactions     Diflucan [Fluconazole] Itching and Swelling     Tylenol W/Codeine [Acetaminophen-Codeine] Nausea and Vomiting            Social History:     Social History    Substance Use Topics     Smoking status: Former Smoker     Types: Cigarettes     Quit date: 1/1/2016     Smokeless tobacco: Never Used      Comment: quit 1/1/10     Alcohol use 0.0 oz/week     0 Standard drinks or equivalent per week      Comment: occas - 5-6 beers 2 times weekly       History   Drug Use No           Family History:     The patient's family history is notable for non.    Family History   Problem Relation Age of Onset     Thyroid Disease Mother      Lipids Mother      Depression Mother      HEART DISEASE Maternal Grandmother      MI     CANCER Maternal Grandfather      kidney         Physical Exam:     LMP 04/03/2017 (Approximate)  There is no height or weight on file to calculate BMI.    General Appearance: healthy and alert, no distress     HEENT:  no thyromegaly, no palpable nodules or masses        Cardiovascular: regular rate and rhythm, no gallops, rubs or murmurs     Respiratory: lungs clear, no rales, rhonchi or wheezes, normal diaphragmatic excursion    Musculoskeletal: extremities non tender and without edema    Skin: no lesions or rashes     Neurological: normal gait, no gross defects     Psychiatric: appropriate mood and affect                               Hematological: normal cervical, supraclavicular and inguinal lymph nodes     Gastrointestinal:       abdomen soft, non-tender, non-distended, no organomegaly or masses    Genitourinary: External genitalia and urethral meatus appears normal.  Vagina is smooth without nodularity or masses.  Cervix appears multiparous, no lesions or masses, IUD string present. Noticeable white, thickened areas along periclitoral area. Bimanual exam reveal no masses, nodularity or fullness.  Recto-vaginal exam confirms these findings.      Assessment:    Joel Arthur is a 36 year old woman with a new diagnosis of adenocarcinoma of the cervix.        Plan:     1.)     Will schedule DaVinci assisted total laparoscopic hysterectomy, bilateral oophorectomy  and salpingectomy, possible pelvic and paraaortic lymph node dissection. Will leave ovaries if normal. Written and verbal consent obtained from patient.      2.) Genetic risk factors were assessed and the patient does not meet the qualifications for a referral.      3.) Labs and/or tests ordered include:  none.     4.) Health maintenance issues addressed today include none.    5.) Pre-op teaching was completed today.  Risks of surgery were discussed to include: bleeding, transfusion, infection, unintentional injury to surrounding organs/structures.    6.) Lichen sclerosis: clobetasol ordered - [facility]       Thank you for allowing us to participate in the care of your patient.         Sincerely,    Martha Talbert MD    Department of Ob/Gyn and Women's Health  Division of Gynecologic Oncology  [facility]  [phone]        Patient Care Team:  Danette Clark MD as PCP - General (Family Practice)  DANETTE CLARK    I have reviewed the above note and agree with the scribe's notation as written.    I, Jak Mills, am serving as a scribe to document services personally performed by Martha Talbert MD, based upon my observations and the provider's statements to me. All documentation has been reviewed by the aforementioned doctor prior to being entered into the official medical record.         Again, thank you for allowing me to participate in the care of your patient.      Sincerely,    Martha Talbert MD

## 2017-04-27 NOTE — NURSING NOTE
"Oncology Rooming Note    April 27, 2017 10:30 AM   Joel Arthur is a 53 year old female who presents for: Oncology Clinic Visit    Initial Vitals: LMP 04/03/2017 (Approximate) Estimated body mass index is 24.51 kg/(m^2) as calculated from the following:    Height as of 4/13/17: 1.575 m (5' 2\").    Weight as of 4/13/17: 60.8 kg (134 lb). There is no height or weight on file to calculate BSA.  No Pain (0) Comment: Data Unavailable   Patient's last menstrual period was 04/03/2017 (approximate).  Allergies reviewed: Yes  Medications reviewed: Yes    Medications: Medication refills not needed today.  Pharmacy name entered into MeetingSense Software:    [id number] - [place], [place] - [address] [facility]  [id number] - [place], [place] - [address] [place] [address]    Clinical concerns: Pt stated she bled 2 weeks post op.  She is unsure if that is normal.  She also stated the Percocet made her really groggy, so she didn't take much. She is curious about other options for her next surgery. Dr Talbert was notified.    7 minutes for nursing intake (face to face time)     Sharri Kirk LPN                "

## 2017-04-27 NOTE — MR AVS SNAPSHOT
After Visit Summary   4/27/2017    Joel Arthur    MRN: [id number]           Patient Information     Date Of Birth          1980        Visit Information        Provider Department      4/27/2017 10:20 AM Martha Talbert MD [facility]        Today's Diagnoses     Adenocarcinoma in situ (AIS) of uterine cervix    -  1    Preoperative examination        Lichen sclerosus et atrophicus           Follow-ups after your visit        Your next 10 appointments already scheduled     May 24, 2017   Procedure with Martha Talbert MD   [facility], [place], Same Day Surgery (--)    [address] [facility] [id number] [phone]            Pepe 15, 2017 11:40 AM CDT   (Arrive by 11:25 AM)   Post-Op with Martha Talbert MD   [facility] ([facility])    [address] [address] [phone]              Who to contact     If you have questions or need follow up information about today's clinic visit or your schedule please contact [facility] directly at [phone].  Normal or non-critical lab and imaging results will be communicated to you by MyChart, letter or phone within 4 business days after the clinic has received the results. If you do not hear from us within 7 days, please contact the clinic through Tomorrowhart or phone. If you have a critical or abnormal lab result, we will notify you by phone as soon as possible.  Submit refill requests through LeadPoint or call your pharmacy and they will forward the refill request to us. Please allow 3 business days for your refill to be completed.          Additional Information About Your Visit        MyChart Information     LeadPoint gives you secure access to your electronic health record. If you see a primary care provider, you can also send messages to your care team and make appointments. If you have questions, please call your primary care  "clinic.  If you do not have a primary care provider, please call [phone] and they will assist you.        Care EveryWhere ID     This is your Care EveryWhere ID. This could be used by other organizations to access your [facility] medical records  [id number]        Your Vitals Were     Pulse Temperature Respirations Height Last Period Pulse Oximetry    71 97.8  F (36.6  C) (Oral) 16 1.575 m (5' 2.01\") 04/03/2017 (Approximate) 99%    BMI (Body Mass Index)                   24.65 kg/m2            Blood Pressure from Last 3 Encounters:   No data found for BP    Weight from Last 3 Encounters:   No data found for Wt              Today, you had the following     No orders found for display         Today's Medication Changes          These changes are accurate as of: 4/27/17 11:59 PM.  If you have any questions, ask your nurse or doctor.               Start taking these medicines.        Dose/Directions    clobetasol 0.05 % cream   Commonly known as:  TEMOVATE   Used for:  Adenocarcinoma in situ (AIS) of uterine cervix, Lichen sclerosus et atrophicus   Started by:  Martha Talbert MD        Apply sparingly to affected area twice daily as needed.  Do not apply to face.   Quantity:  60 g   Refills:  0            Where to get your medicines      These medications were sent to [facility] [address] [place] [address]  [address] [address]     Phone:  [phone]     clobetasol 0.05 % cream                Primary Care Provider Office Phone # Fax #    Tish Gibson [phone]       [facility] [address] [facility] [address]        Thank you!     Thank you for choosing [facility]  for your care. Our goal is always to provide you with excellent care. Hearing back from our patients is one way we can continue to improve our services. Please take a few minutes to complete the written survey that you may receive in the mail after your " visit with us. Thank you!             Your Updated Medication List - Protect others around you: Learn how to safely use, store and throw away your medicines at www.disposemymeds.org.          This list is accurate as of: 4/27/17 11:59 PM.  Always use your most recent med list.                   Brand Name Dispense Instructions for use    clobetasol 0.05 % cream    TEMOVATE    60 g    Apply sparingly to affected area twice daily as needed.  Do not apply to face.       LORazepam 0.5 MG tablet    ATIVAN    30 tablet    Take 1 tablet (0.5 mg) by mouth every 8 hours as needed for anxiety       oxyCODONE-acetaminophen 5-325 MG per tablet    PERCOCET    10 tablet    Take 1-2 tablets by mouth every 4 hours as needed for pain (moderate to severe)       WOMENS MULTI VITAMIN & MINERAL PO      Take by mouth daily

## 2017-05-23 ENCOUNTER — ANESTHESIA EVENT (OUTPATIENT)
Dept: SURGERY | Facility: CLINIC | Age: 37
End: 2017-05-23
Payer: COMMERCIAL

## 2017-05-24 ENCOUNTER — HOSPITAL ENCOUNTER (OUTPATIENT)
Facility: CLINIC | Age: 37
Discharge: HOME | End: 2017-05-24
Attending: OBSTETRICS & GYNECOLOGY | Admitting: OBSTETRICS & GYNECOLOGY
Payer: COMMERCIAL

## 2017-05-24 ENCOUNTER — SURGERY (OUTPATIENT)
Age: 37
End: 2017-05-24
Payer: COMMERCIAL

## 2017-05-24 ENCOUNTER — ANESTHESIA (OUTPATIENT)
Dept: SURGERY | Facility: CLINIC | Age: 37
End: 2017-05-24
Payer: COMMERCIAL

## 2017-05-24 VITALS
RESPIRATION RATE: 14 BRPM | HEIGHT: 62 IN | DIASTOLIC BLOOD PRESSURE: 62 MMHG | OXYGEN SATURATION: 99 % | TEMPERATURE: 97.5 F | WEIGHT: 137.79 LBS | SYSTOLIC BLOOD PRESSURE: 97 MMHG | BODY MASS INDEX: 25.36 KG/M2

## 2017-05-24 DIAGNOSIS — Z90.710 S/P LAPAROSCOPIC HYSTERECTOMY: Primary | ICD-10-CM

## 2017-05-24 DIAGNOSIS — D06.9 ADENOCARCINOMA IN SITU (AIS) OF UTERINE CERVIX: ICD-10-CM

## 2017-05-24 DIAGNOSIS — Z01.818 PREOPERATIVE EXAMINATION: ICD-10-CM

## 2017-05-24 LAB
ABO + RH BLD: NORMAL
ABO + RH BLD: NORMAL
BLD GP AB SCN SERPL QL: NORMAL
BLOOD BANK CMNT PATIENT-IMP: NORMAL
CREAT SERPL-MCNC: 0.8 MG/DL (ref 0.52–1.04)
GFR SERPL CREATININE-BSD FRML MDRD: 81 ML/MIN/1.7M2
HCG UR QL: NEGATIVE
HGB BLD-MCNC: 12.7 G/DL (ref 11.7–15.7)
POTASSIUM SERPL-SCNC: 4.1 MMOL/L (ref 3.4–5.3)
SPECIMEN EXP DATE BLD: NORMAL

## 2017-05-24 PROCEDURE — 86901 BLOOD TYPING SEROLOGIC RH(D): CPT | Performed by: OBSTETRICS & GYNECOLOGY

## 2017-05-24 PROCEDURE — 25000128 H RX IP 250 OP 636: Performed by: OBSTETRICS & GYNECOLOGY

## 2017-05-24 PROCEDURE — 36415 COLL VENOUS BLD VENIPUNCTURE: CPT | Performed by: OBSTETRICS & GYNECOLOGY

## 2017-05-24 PROCEDURE — 71000014 ZZH RECOVERY PHASE 1 LEVEL 2 FIRST HR: Performed by: OBSTETRICS & GYNECOLOGY

## 2017-05-24 PROCEDURE — 88309 TISSUE EXAM BY PATHOLOGIST: CPT | Performed by: OBSTETRICS & GYNECOLOGY

## 2017-05-24 PROCEDURE — 27210794 ZZH OR GENERAL SUPPLY STERILE: Performed by: OBSTETRICS & GYNECOLOGY

## 2017-05-24 PROCEDURE — 40000171 ZZH STATISTIC PRE-PROCEDURE ASSESSMENT III: Performed by: OBSTETRICS & GYNECOLOGY

## 2017-05-24 PROCEDURE — 85018 HEMOGLOBIN: CPT | Performed by: OBSTETRICS & GYNECOLOGY

## 2017-05-24 PROCEDURE — 82565 ASSAY OF CREATININE: CPT | Performed by: OBSTETRICS & GYNECOLOGY

## 2017-05-24 PROCEDURE — 25000128 H RX IP 250 OP 636: Performed by: ANESTHESIOLOGY

## 2017-05-24 PROCEDURE — 86850 RBC ANTIBODY SCREEN: CPT | Performed by: OBSTETRICS & GYNECOLOGY

## 2017-05-24 PROCEDURE — 86900 BLOOD TYPING SEROLOGIC ABO: CPT | Performed by: OBSTETRICS & GYNECOLOGY

## 2017-05-24 PROCEDURE — 25000128 H RX IP 250 OP 636

## 2017-05-24 PROCEDURE — C9399 UNCLASSIFIED DRUGS OR BIOLOG: HCPCS | Performed by: NURSE ANESTHETIST, CERTIFIED REGISTERED

## 2017-05-24 PROCEDURE — 81025 URINE PREGNANCY TEST: CPT | Performed by: ANESTHESIOLOGY

## 2017-05-24 PROCEDURE — 71000027 ZZH RECOVERY PHASE 2 EACH 15 MINS: Performed by: OBSTETRICS & GYNECOLOGY

## 2017-05-24 PROCEDURE — 36000088 ZZH SURGERY LEVEL 8 EA 15 ADDTL MIN - UMMC: Performed by: OBSTETRICS & GYNECOLOGY

## 2017-05-24 PROCEDURE — 25000125 ZZHC RX 250: Performed by: OBSTETRICS & GYNECOLOGY

## 2017-05-24 PROCEDURE — 58571 TLH W/T/O 250 G OR LESS: CPT | Mod: GC | Performed by: OBSTETRICS & GYNECOLOGY

## 2017-05-24 PROCEDURE — 25000125 ZZHC RX 250: Performed by: NURSE ANESTHETIST, CERTIFIED REGISTERED

## 2017-05-24 PROCEDURE — 37000008 ZZH ANESTHESIA TECHNICAL FEE, 1ST 30 MIN: Performed by: OBSTETRICS & GYNECOLOGY

## 2017-05-24 PROCEDURE — 25000132 ZZH RX MED GY IP 250 OP 250 PS 637: Performed by: OBSTETRICS & GYNECOLOGY

## 2017-05-24 PROCEDURE — 25000565 ZZH ISOFLURANE, EA 15 MIN: Performed by: OBSTETRICS & GYNECOLOGY

## 2017-05-24 PROCEDURE — 25000125 ZZHC RX 250

## 2017-05-24 PROCEDURE — 36000086 ZZH SURGERY LEVEL 8 1ST 30 MIN UMMC: Performed by: OBSTETRICS & GYNECOLOGY

## 2017-05-24 PROCEDURE — 71000015 ZZH RECOVERY PHASE 1 LEVEL 2 EA ADDTL HR: Performed by: OBSTETRICS & GYNECOLOGY

## 2017-05-24 PROCEDURE — 84132 ASSAY OF SERUM POTASSIUM: CPT | Performed by: OBSTETRICS & GYNECOLOGY

## 2017-05-24 PROCEDURE — 37000009 ZZH ANESTHESIA TECHNICAL FEE, EACH ADDTL 15 MIN: Performed by: OBSTETRICS & GYNECOLOGY

## 2017-05-24 PROCEDURE — 25000128 H RX IP 250 OP 636: Performed by: NURSE ANESTHETIST, CERTIFIED REGISTERED

## 2017-05-24 PROCEDURE — 25000125 ZZHC RX 250: Performed by: ANESTHESIOLOGY

## 2017-05-24 RX ORDER — ACETAMINOPHEN 325 MG/1
975 TABLET ORAL ONCE
Status: COMPLETED | OUTPATIENT
Start: 2017-05-24 | End: 2017-05-24

## 2017-05-24 RX ORDER — ONDANSETRON 2 MG/ML
4 INJECTION INTRAMUSCULAR; INTRAVENOUS EVERY 30 MIN PRN
Status: DISCONTINUED | OUTPATIENT
Start: 2017-05-24 | End: 2017-05-24 | Stop reason: HOSPADM

## 2017-05-24 RX ORDER — FENTANYL CITRATE 50 UG/ML
25-50 INJECTION, SOLUTION INTRAMUSCULAR; INTRAVENOUS
Status: DISCONTINUED | OUTPATIENT
Start: 2017-05-24 | End: 2017-05-24 | Stop reason: HOSPADM

## 2017-05-24 RX ORDER — ONDANSETRON 4 MG/1
4 TABLET, ORALLY DISINTEGRATING ORAL
Status: DISCONTINUED | OUTPATIENT
Start: 2017-05-24 | End: 2017-05-24 | Stop reason: HOSPADM

## 2017-05-24 RX ORDER — LIDOCAINE HYDROCHLORIDE 20 MG/ML
INJECTION, SOLUTION INFILTRATION; PERINEURAL PRN
Status: DISCONTINUED | OUTPATIENT
Start: 2017-05-24 | End: 2017-05-24

## 2017-05-24 RX ORDER — EPHEDRINE SULFATE 50 MG/ML
INJECTION, SOLUTION INTRAMUSCULAR; INTRAVENOUS; SUBCUTANEOUS PRN
Status: DISCONTINUED | OUTPATIENT
Start: 2017-05-24 | End: 2017-05-24

## 2017-05-24 RX ORDER — ONDANSETRON 2 MG/ML
INJECTION INTRAMUSCULAR; INTRAVENOUS PRN
Status: DISCONTINUED | OUTPATIENT
Start: 2017-05-24 | End: 2017-05-24

## 2017-05-24 RX ORDER — LIDOCAINE HYDROCHLORIDE AND EPINEPHRINE 10; 10 MG/ML; UG/ML
INJECTION, SOLUTION INFILTRATION; PERINEURAL PRN
Status: DISCONTINUED | OUTPATIENT
Start: 2017-05-24 | End: 2017-05-24 | Stop reason: HOSPADM

## 2017-05-24 RX ORDER — HEPARIN SODIUM 5000 [USP'U]/.5ML
5000 INJECTION, SOLUTION INTRAVENOUS; SUBCUTANEOUS ONCE
Status: COMPLETED | OUTPATIENT
Start: 2017-05-24 | End: 2017-05-24

## 2017-05-24 RX ORDER — OXYCODONE HYDROCHLORIDE 5 MG/1
5-10 TABLET ORAL
Qty: 30 TABLET | Refills: 0 | Status: SHIPPED | OUTPATIENT
Start: 2017-05-24 | End: 2018-02-19

## 2017-05-24 RX ORDER — PHENAZOPYRIDINE HYDROCHLORIDE 200 MG/1
200 TABLET, FILM COATED ORAL ONCE
Status: COMPLETED | OUTPATIENT
Start: 2017-05-24 | End: 2017-05-24

## 2017-05-24 RX ORDER — HYDROMORPHONE HYDROCHLORIDE 1 MG/ML
.3-.5 INJECTION, SOLUTION INTRAMUSCULAR; INTRAVENOUS; SUBCUTANEOUS EVERY 5 MIN PRN
Status: DISCONTINUED | OUTPATIENT
Start: 2017-05-24 | End: 2017-05-24 | Stop reason: HOSPADM

## 2017-05-24 RX ORDER — AMOXICILLIN 250 MG
1-2 CAPSULE ORAL 2 TIMES DAILY
Qty: 30 TABLET | Refills: 0 | Status: SHIPPED | OUTPATIENT
Start: 2017-05-24 | End: 2018-02-19

## 2017-05-24 RX ORDER — PROPOFOL 10 MG/ML
INJECTION, EMULSION INTRAVENOUS PRN
Status: DISCONTINUED | OUTPATIENT
Start: 2017-05-24 | End: 2017-05-24

## 2017-05-24 RX ORDER — OXYCODONE HYDROCHLORIDE 5 MG/1
5-10 TABLET ORAL
Status: DISCONTINUED | OUTPATIENT
Start: 2017-05-24 | End: 2017-05-24 | Stop reason: HOSPADM

## 2017-05-24 RX ORDER — SODIUM CHLORIDE, SODIUM LACTATE, POTASSIUM CHLORIDE, CALCIUM CHLORIDE 600; 310; 30; 20 MG/100ML; MG/100ML; MG/100ML; MG/100ML
INJECTION, SOLUTION INTRAVENOUS CONTINUOUS
Status: DISCONTINUED | OUTPATIENT
Start: 2017-05-24 | End: 2017-05-24 | Stop reason: HOSPADM

## 2017-05-24 RX ORDER — ONDANSETRON 4 MG/1
4 TABLET, ORALLY DISINTEGRATING ORAL EVERY 30 MIN PRN
Status: DISCONTINUED | OUTPATIENT
Start: 2017-05-24 | End: 2017-05-24 | Stop reason: HOSPADM

## 2017-05-24 RX ORDER — NALOXONE HYDROCHLORIDE 0.4 MG/ML
.1-.4 INJECTION, SOLUTION INTRAMUSCULAR; INTRAVENOUS; SUBCUTANEOUS
Status: DISCONTINUED | OUTPATIENT
Start: 2017-05-24 | End: 2017-05-24 | Stop reason: HOSPADM

## 2017-05-24 RX ORDER — FLUMAZENIL 0.1 MG/ML
0.2 INJECTION, SOLUTION INTRAVENOUS
Status: DISCONTINUED | OUTPATIENT
Start: 2017-05-24 | End: 2017-05-24 | Stop reason: HOSPADM

## 2017-05-24 RX ORDER — ACETAMINOPHEN 325 MG/1
650 TABLET ORAL EVERY 4 HOURS PRN
Qty: 100 TABLET | Refills: 0 | Status: SHIPPED | OUTPATIENT
Start: 2017-05-24 | End: 2019-02-28

## 2017-05-24 RX ORDER — CEFAZOLIN SODIUM 2 G/100ML
2 INJECTION, SOLUTION INTRAVENOUS
Status: COMPLETED | OUTPATIENT
Start: 2017-05-24 | End: 2017-05-24

## 2017-05-24 RX ORDER — DEXAMETHASONE SODIUM PHOSPHATE 4 MG/ML
INJECTION, SOLUTION INTRA-ARTICULAR; INTRALESIONAL; INTRAMUSCULAR; INTRAVENOUS; SOFT TISSUE PRN
Status: DISCONTINUED | OUTPATIENT
Start: 2017-05-24 | End: 2017-05-24

## 2017-05-24 RX ORDER — IBUPROFEN 600 MG/1
600 TABLET, FILM COATED ORAL EVERY 6 HOURS PRN
Qty: 30 TABLET | Refills: 0 | Status: SHIPPED | OUTPATIENT
Start: 2017-05-24 | End: 2018-02-19

## 2017-05-24 RX ORDER — IBUPROFEN 200 MG
600 TABLET ORAL
Status: DISCONTINUED | OUTPATIENT
Start: 2017-05-24 | End: 2017-05-24 | Stop reason: HOSPADM

## 2017-05-24 RX ADMIN — LIDOCAINE HYDROCHLORIDE 60 MG: 20 INJECTION, SOLUTION INFILTRATION; PERINEURAL at 07:31

## 2017-05-24 RX ADMIN — Medication 5 MG: at 07:53

## 2017-05-24 RX ADMIN — DEXAMETHASONE SODIUM PHOSPHATE 8 MG: 4 INJECTION, SOLUTION INTRA-ARTICULAR; INTRALESIONAL; INTRAMUSCULAR; INTRAVENOUS; SOFT TISSUE at 07:59

## 2017-05-24 RX ADMIN — PHENYLEPHRINE HYDROCHLORIDE 100 MCG: 10 INJECTION, SOLUTION INTRAMUSCULAR; INTRAVENOUS; SUBCUTANEOUS at 07:41

## 2017-05-24 RX ADMIN — CEFAZOLIN SODIUM 1 G: 2 INJECTION, SOLUTION INTRAVENOUS at 09:29

## 2017-05-24 RX ADMIN — ROCURONIUM BROMIDE 20 MG: 10 INJECTION INTRAVENOUS at 08:38

## 2017-05-24 RX ADMIN — CEFAZOLIN SODIUM 2 G: 2 INJECTION, SOLUTION INTRAVENOUS at 07:36

## 2017-05-24 RX ADMIN — SUGAMMADEX 120 MG: 100 INJECTION, SOLUTION INTRAVENOUS at 10:06

## 2017-05-24 RX ADMIN — PROPOFOL 180 MG: 10 INJECTION, EMULSION INTRAVENOUS at 07:31

## 2017-05-24 RX ADMIN — PHENAZOPYRIDINE HYDROCHLORIDE 200 MG: 200 TABLET, FILM COATED ORAL at 06:44

## 2017-05-24 RX ADMIN — ROCURONIUM BROMIDE 20 MG: 10 INJECTION INTRAVENOUS at 08:07

## 2017-05-24 RX ADMIN — ROCURONIUM BROMIDE 20 MG: 10 INJECTION INTRAVENOUS at 07:49

## 2017-05-24 RX ADMIN — FENTANYL CITRATE 50 MCG: 50 INJECTION, SOLUTION INTRAMUSCULAR; INTRAVENOUS at 07:31

## 2017-05-24 RX ADMIN — HYDROMORPHONE HYDROCHLORIDE 0.3 MG: 10 INJECTION, SOLUTION INTRAMUSCULAR; INTRAVENOUS; SUBCUTANEOUS at 11:28

## 2017-05-24 RX ADMIN — FENTANYL CITRATE 50 MCG: 50 INJECTION, SOLUTION INTRAMUSCULAR; INTRAVENOUS at 09:07

## 2017-05-24 RX ADMIN — FENTANYL CITRATE 50 MCG: 50 INJECTION, SOLUTION INTRAMUSCULAR; INTRAVENOUS at 11:46

## 2017-05-24 RX ADMIN — HYDROMORPHONE HYDROCHLORIDE 0.5 MG: 1 INJECTION, SOLUTION INTRAMUSCULAR; INTRAVENOUS; SUBCUTANEOUS at 08:08

## 2017-05-24 RX ADMIN — ONDANSETRON 4 MG: 2 INJECTION INTRAMUSCULAR; INTRAVENOUS at 09:48

## 2017-05-24 RX ADMIN — ROCURONIUM BROMIDE 10 MG: 10 INJECTION INTRAVENOUS at 09:21

## 2017-05-24 RX ADMIN — ROCURONIUM BROMIDE 30 MG: 10 INJECTION INTRAVENOUS at 07:31

## 2017-05-24 RX ADMIN — LIDOCAINE HYDROCHLORIDE AND EPINEPHRINE 20 ML: 10; 10 INJECTION, SOLUTION INFILTRATION; PERINEURAL at 10:03

## 2017-05-24 RX ADMIN — FENTANYL CITRATE 50 MCG: 50 INJECTION, SOLUTION INTRAMUSCULAR; INTRAVENOUS at 11:05

## 2017-05-24 RX ADMIN — ACETAMINOPHEN 975 MG: 325 TABLET, FILM COATED ORAL at 06:43

## 2017-05-24 RX ADMIN — MIDAZOLAM HYDROCHLORIDE 2 MG: 1 INJECTION, SOLUTION INTRAMUSCULAR; INTRAVENOUS at 07:25

## 2017-05-24 RX ADMIN — FENTANYL CITRATE 50 MCG: 50 INJECTION, SOLUTION INTRAMUSCULAR; INTRAVENOUS at 08:40

## 2017-05-24 RX ADMIN — SODIUM CHLORIDE, POTASSIUM CHLORIDE, SODIUM LACTATE AND CALCIUM CHLORIDE: 600; 310; 30; 20 INJECTION, SOLUTION INTRAVENOUS at 07:31

## 2017-05-24 RX ADMIN — HEPARIN SODIUM 5000 UNITS: 5000 INJECTION, SOLUTION INTRAVENOUS; SUBCUTANEOUS at 06:57

## 2017-05-24 ASSESSMENT — PAIN DESCRIPTION - DESCRIPTORS
DESCRIPTORS: PRESSURE
DESCRIPTORS: PRESSURE
DESCRIPTORS: CRAMPING
DESCRIPTORS: PRESSURE
DESCRIPTORS: OTHER (COMMENT)

## 2017-05-24 NOTE — ANESTHESIA CARE TRANSFER NOTE
Patient: Joel Arthur    Procedure(s):  DaVinci Assisted Total Laparoscopic Hysterectomy, Bilateral Salpingectomy,  Cystoscopy, Anesthesia General - Wound Class: II-Clean Contaminated   - Wound Class: II-Clean Contaminated    Diagnosis: Adenocarcinoma Insitu (AIS) Of Uterine Cervix   Diagnosis Additional Information: No value filed.    Anesthesia Type:   General, ETT     Note:    Patient transferred to:PACU  Comments: Anesthesia Care Transfer Note    Patient: Joel Arthur    Transferred to: PACU with supplemental O2    Patient vital signs: stable    Airway: none    Monitors on, VSS, breathing spontaneously, report to RN      Savita Jones CRNA   5/24/2017 10:18 AM      Vitals: (Last set prior to Anesthesia Care Transfer)    CRNA VITALS  5/24/2017 0944 - 5/24/2017 1018      5/24/2017             Pulse: 101    Ht Rate: 116    SpO2: 100 %    Resp Rate (observed): 14                Electronically Signed By: LUX Jackson CRNA  May 24, 2017  10:18 AM

## 2017-05-24 NOTE — BRIEF OP NOTE
[facility], [place]    Brief Operative Note    Pre-operative diagnosis: Adenocarcinoma Insitu (AIS) Of Uterine Cervix   Post-operative diagnosis Same, pending final pathology    Procedure:  DaVinci assisted laparoscopic hysterectomy  Bilateral salpingectomy  Cystoscopy    Surgeon: Surgeon(s) and Role:     * Martha Talbert MD - Primary     * Paulina Diaz MD - Assisting     * Ana Aguayo MD - Resident - Assisting       Anesthesia: General, local 1% lidocaine with epinephrine  IVF: 1000 mL  UOP: 400 mL    Estimated blood loss: 50 mL    Drains: None  Specimens:   ID Type Source Tests Collected by Time Destination   A : cervix, uterus, bilateral fallopian tubes Tissue Uterus and Cervix SURGICAL PATHOLOGY EXAM Martha Talbert MD 5/24/2017  9:23 AM      Findings: Bimanual exam with anteverted, small, mobile uterus. No adnexal masses. Speculum exam with small amount of remaining cervix, consistent with previous cold knife cone. Otherwise normal in appearance. Laparoscopy without evidence of injury from entry. Abdominal survey demonstrates normal appearing liver, diaphragm, stomach, omentum. Normal appearing uterus, bilateral fallopian tubes, and ovaries.   Complications: none apparent  Implants: None.    Patient stable to PACU.  Ana Aguayo MD  Gyn Oncology Service  PGY3 Resident

## 2017-05-24 NOTE — ANESTHESIA POSTPROCEDURE EVALUATION
Patient: Joel Arthur    Procedure(s):  DaVinci Assisted Total Laparoscopic Hysterectomy, Bilateral Salpingectomy,  Cystoscopy, Anesthesia General - Wound Class: II-Clean Contaminated   - Wound Class: II-Clean Contaminated    Diagnosis:Adenocarcinoma Insitu (AIS) Of Uterine Cervix   Diagnosis Additional Information: No value filed.    Anesthesia Type:  General, ETT    Note:  Anesthesia Post Evaluation    Patient location during evaluation: PACU  Patient participation: Able to fully participate in evaluation  Level of consciousness: awake and alert  Pain management: adequate  Airway patency: patent  Cardiovascular status: acceptable  Respiratory status: acceptable  Hydration status: acceptable  PONV: controlled     Anesthetic complications: None    Comments: Doing well.        Last vitals:  Vitals:    05/24/17 1130 05/24/17 1145 05/24/17 1200   BP: 107/64 93/63 101/61   Resp: 12 12 12   Temp: 36.8  C (98.2  F)     SpO2: 93% 100% 98%         Electronically Signed By: Lupillo Linares MD  May 24, 2017  12:15 PM

## 2017-05-24 NOTE — IP AVS SNAPSHOT
MRN:[id number]                      After Visit Summary   5/24/2017    Joel Arthur    MRN: [id number]           Thank you!     Thank you for choosing [facility] for your care. Our goal is always to provide you with excellent care. Hearing back from our patients is one way we can continue to improve our services. Please take a few minutes to complete the written survey that you may receive in the mail after you visit with us. Thank you!        Patient Information     Date Of Birth          1980        About your hospital stay     You were admitted on:  May 24, 2017 You last received care in the:  Post Anesthesia Care Unit [facility]    You were discharged on:  May 24, 2017       Who to Call     For medical emergencies, please call 911.  For non-urgent questions about your medical care, please call your primary care provider or clinic, [phone]  For questions related to your surgery, please call your surgery clinic        Attending Provider     Provider Martha Horne MD Oncology       Primary Care Provider Office Phone # Fax #    Tish Gibson [phone]       [facility] [address] [place] [address]        After Care Instructions     Discharge Instructions       Resume pre procedure diet            Discharge Instructions       Pelvic Rest. No tampons, douching or intercourse for  6  weeks.            Discharge Instructions       Follow-up with Dr. Talbert on 6/15 as previously scheduled.            Discharge Instructions       PATIENT INSTRUCTIONS  FOLLOW-UP:    Call Surgeon if you have:  Temperature greater than 100.4  Persistent nausea and vomiting  Severe uncontrolled pain  Difficulty breathing, headache or visual disturbances  Hives  Persistent dizziness or light-headedness  Extreme fatigue  Any other questions or concerns you may have after discharge    In an emergency, call 911 or go to an Emergency Department at a nearby  hospital     Your activity upon discharge: Activity as tolerated  No lifting or strenuous exercise for 6 weeks  No driving for 2 weeks or no driving while on narcotic pain medications  Nothing in the vagina including sex, douching or tampons for 6 weeks            Dressing       Keep dressing clean and dry, change as instructed by Provider or RN            Ice to affected area       PRN as tolerated            No alcohol       NO ALCOHOL for 24 hours post procedure            No driving or operating machinery       No driving or operating machinery until day after procedure            No lifting       No lifting over 10 pounds and no strenuous physical activity.  For 6 weeks            Shower        Shower on Post-op day  1.   DO NOT take a bath  Remove dressing on POD#1.                  Your next 10 appointments already scheduled     Pepe 15, 2017 11:40 AM CDT   (Arrive by 11:25 AM)   Post-Op with Martha Talbert MD   [facility] ([facility])    [address] [place] [address] [phone]              Further instructions from your care team       REMEMBER: SAME DAY SURGERY IS NOT SAME DAY RECOVERY. TAKE IT EASY, GET PLENTY OF REST, AND HAVE SOMEONE WITH YOU FOR 24 HOURS. FOLLOW THESE INSTRUCTIONS AND PLEASE DO NOT HESITATE TO CALL WITH ANY QUESTIONS.    [facility], [place]  Same-Day Surgery   Adult Discharge Orders & Instructions     For 24 hours after surgery    1. Get plenty of rest.  A responsible adult must stay with you for at least 24 hours after you leave the hospital.   2. Do not drive or use heavy equipment.  If you have weakness or tingling, don't drive or use heavy equipment until this feeling goes away.  3. Do not drink alcohol.  4. Avoid strenuous or risky activities.  Ask for help when climbing stairs.   5. You may feel lightheaded.  IF so, sit for a few minutes before standing.  Have someone  help you get up.   6. If you have nausea (feel sick to your stomach): Drink only clear liquids such as apple juice, ginger ale, broth or 7-Up.  Rest may also help.  Be sure to drink enough fluids.  Move to a regular diet as you feel able.  7. You may have a slight fever. Call the doctor if your fever is over 100 F (37.7 C) (taken under the tongue) or lasts longer than 24 hours.  8. You may have a dry mouth, a sore throat, muscle aches or trouble sleeping.  These should go away after 24 hours.  9. Do not make important or legal decisions.   Call your doctor for any of the followin.  Signs of infection (fever, growing tenderness at the surgery site, a large amount of drainage or bleeding, severe pain, foul-smelling drainage, redness, swelling).    2. It has been over 8 to 10 hours since surgery and you are still not able to urinate (pass water).    3.  Headache for over 24 hours.  To contact a doctor, call Dr Talbert's office at [phone] or:        [phone] and ask for the resident on call for Gynecology/Oncology (answered 24 hours a day)      Emergency Department:    [facility]: [phone]       (TTY for hearing impaired: [phone])             Tips for taking pain medications  To get the best pain relief possible , remember these points:      Take pain medications as directed, before pain becomes severe      Pain medication can upset your stomach: taking it with food may help      Constipation is a common side effect of pain medication. Drink plenty of  Fluids      Eat foods high in fiber. Take a stool softener  if recommended by your doctor or  Pharmacist.       Ask about other ways to control pain, such as with heat, ice or relaxation.           Additional Information     If you use hormonal birth control (such as the pill, patch, ring or implants): You'll need a second form of birth control for 7 days (condoms, a diaphragm or contraceptive foam). While in the hospital, you received a  "medicine called Bridion. Your normal birth control will not work as well for a week after taking this medicine.          Pending Results     No orders found from 5/22/2017 to 5/25/2017.            Admission Information     Date & Time Provider Department Dept. Phone    5/24/2017 Martha Talbert MD Post Anesthesia Care Unit [facility] [phone]      Your Vitals Were     Blood Pressure Temperature Respirations Height Weight Last Period    101/61 98.2  F (36.8  C) (Oral) 12 1.575 m (5' 2\") 62.5 kg (137 lb 12.6 oz) 05/01/2017    Pulse Oximetry BMI (Body Mass Index)                98% 25.2 kg/m2          Userscouthart Information     [employer] gives you secure access to your electronic health record. If you see a primary care provider, you can also send messages to your care team and make appointments. If you have questions, please call your primary care clinic.  If you do not have a primary care provider, please call [phone] and they will assist you.        Care EveryWhere ID     This is your Care EveryWhere ID. This could be used by other organizations to access your [facility] medical records  [id number]           Review of your medicines      START taking        Dose / Directions    acetaminophen 325 MG tablet   Commonly known as:  TYLENOL   Used for:  S/P laparoscopic hysterectomy        Dose:  650 mg   Take 2 tablets (650 mg) by mouth every 4 hours as needed for other (mild pain)   Quantity:  100 tablet   Refills:  0       ibuprofen 600 MG tablet   Commonly known as:  ADVIL/MOTRIN   Used for:  S/P laparoscopic hysterectomy        Dose:  600 mg   Take 1 tablet (600 mg) by mouth every 6 hours as needed for pain (mild)   Quantity:  30 tablet   Refills:  0       oxyCODONE 5 MG IR tablet   Commonly known as:  ROXICODONE   Used for:  S/P laparoscopic hysterectomy        Dose:  5-10 mg   Take 1-2 tablets (5-10 mg) by mouth every 3 hours as needed for pain or other (Moderate to Severe)   Quantity:  30 tablet "   Refills:  0       senna-docusate 8.6-50 MG per tablet   Commonly known as:  SENOKOT-S;PERICOLACE   Used for:  S/P laparoscopic hysterectomy        Dose:  1-2 tablet   Take 1-2 tablets by mouth 2 times daily Take while on oral narcotics to prevent or treat constipation.   Quantity:  30 tablet   Refills:  0         CONTINUE these medicines which have NOT CHANGED        Dose / Directions    clobetasol 0.05 % cream   Commonly known as:  TEMOVATE   Used for:  Adenocarcinoma in situ (AIS) of uterine cervix, Lichen sclerosus et atrophicus        Apply sparingly to affected area twice daily as needed.  Do not apply to face.   Quantity:  60 g   Refills:  0       LORazepam 0.5 MG tablet   Commonly known as:  ATIVAN   Used for:  Adjustment disorder with anxious mood        Dose:  0.5 mg   Take 1 tablet (0.5 mg) by mouth every 8 hours as needed for anxiety   Quantity:  30 tablet   Refills:  1       SIMBRINZA OP        Refills:  0       WOMENS MULTI VITAMIN & MINERAL PO        Take by mouth daily   Refills:  0         STOP taking     oxyCODONE-acetaminophen 5-325 MG per tablet   Commonly known as:  PERCOCET                Where to get your medicines      These medications were sent to [facility] [place], [place] - [address] [facility]  [address] [facility] [address]     Phone:  [phone]     acetaminophen 325 MG tablet    ibuprofen 600 MG tablet    senna-docusate 8.6-50 MG per tablet         Some of these will need a paper prescription and others can be bought over the counter. Ask your nurse if you have questions.     Bring a paper prescription for each of these medications     oxyCODONE 5 MG IR tablet                Protect others around you: Learn how to safely use, store and throw away your medicines at www.disposemymeds.org.             Medication List: This is a list of all your medications and when to take them. Check marks below indicate your daily home schedule. Keep this list as a  reference.      Medications           Morning Afternoon Evening Bedtime As Needed    acetaminophen 325 MG tablet   Commonly known as:  TYLENOL   Take 2 tablets (650 mg) by mouth every 4 hours as needed for other (mild pain)   Last time this was given:  975 mg on 5/24/2017  6:43 AM                                clobetasol 0.05 % cream   Commonly known as:  TEMOVATE   Apply sparingly to affected area twice daily as needed.  Do not apply to face.                                ibuprofen 600 MG tablet   Commonly known as:  ADVIL/MOTRIN   Take 1 tablet (600 mg) by mouth every 6 hours as needed for pain (mild)                                LORazepam 0.5 MG tablet   Commonly known as:  ATIVAN   Take 1 tablet (0.5 mg) by mouth every 8 hours as needed for anxiety                                oxyCODONE 5 MG IR tablet   Commonly known as:  ROXICODONE   Take 1-2 tablets (5-10 mg) by mouth every 3 hours as needed for pain or other (Moderate to Severe)                                senna-docusate 8.6-50 MG per tablet   Commonly known as:  SENOKOT-S;PERICOLACE   Take 1-2 tablets by mouth 2 times daily Take while on oral narcotics to prevent or treat constipation.                                NICOLASBRINZA OP                                WOMENS MULTI VITAMIN & MINERAL PO   Take by mouth daily

## 2017-05-24 NOTE — PROGRESS NOTES
"Post-Op Check      Joel Arthur is a 36 year old  F, POD#0 s/p DaVinci assisted laparoscopic hysterectomy, bilateral salpingectomy, and cystoscopy for adenocarcinoma in situ of cervix.    S: Pt doing well with pain well controlled with oral medications. Denies any nausea, shortness of breath and chest pain. Ambulated to restroom independently and voided spontaneously without any issues.     O:    BP 97/62  Temp 97.5  F (36.4  C) (Oral)  Resp 14  Ht 1.575 m (5' 2\")  Wt 62.5 kg (137 lb 12.6 oz)  LMP 2017  SpO2 98%  BMI 25.2 kg/m2       General:  A&Ox3, NAD  CV:  RRR, no m/r/g  Pulm:  CTAB, good inspiratory effort  Abd: soft, appropriately tender to palpation, nondistended.  No rebound or guarding  Incision: laparoscopic incisions x 5 with dressing in place. No serosanguinous draining on dressing.  LE: no edema, no calf tenderness    A/P:  36 year old F, POD#0 s/p DaVinci assisted laparoscopic hysterectomy, bilateral salpingectomy, and cystoscopy for adenocarcinoma in situ of cervix. Doing well in immediate postop period, meeting goals and ready for discharge home.     Antelmo Perry MD  OB/GYN Resident, PGY-1  2017 1:08 PM         "

## 2017-05-24 NOTE — ANESTHESIA PREPROCEDURE EVALUATION
Joel Arthur is a 36 year old female with a PMH of  Adenocarcinoma Insitu (AIS) Of Uterine Cervix  who is scheduled for Procedure(s):  DaVinci Assisted Total Laparoscopic Hysterectomy, Bilateral Salpingo-Oophorectomy Possible Open, Cystoscopy Anesthesia Block - Wound Class: II-Clean Contaminated   - Wound Class: II-Clean Contaminated   - Wound Class: II-Clean Contaminated    NPO Status: Adequate.  > 6 hours solids, > 2 hours clear liquids.       Past Surgical History:   Procedure Laterality Date     BIOPSY VULVA N/A 4/13/2017    Procedure: BIOPSY VULVA;  Surgeon: Martha Talbert MD;  Location:  OR     COLPOSCOPY, CONIZATION, COMBINED N/A 4/13/2017    Procedure: COMBINED COLPOSCOPY, CONIZATION;  Surgeon: Martha Talbert MD;  Location:  OR     MOUTH SURGERY      wisdom teeth     REMOVE INTRAUTERINE DEVICE N/A 4/13/2017    Procedure: REMOVE INTRAUTERINE DEVICE;  Surgeon: Martha Talbert MD;  Location:  OR       Anesthesia Evaluation     . Pt has had prior anesthetic. Type: MAC and Regional    No history of anesthetic complications          ROS/MED HX    ENT/Pulmonary:  - neg pulmonary ROS     Neurologic:  - neg neurologic ROS     Cardiovascular:  - neg cardiovascular ROS   (+) ----. : . . . :. . No previous cardiac testing       METS/Exercise Tolerance:  >4 METS   Hematologic:  - neg hematologic  ROS       Musculoskeletal:  - neg musculoskeletal ROS       GI/Hepatic:  - neg GI/hepatic ROS       Renal/Genitourinary:  - ROS Renal section negative       Endo:  - neg endo ROS       Psychiatric:     (+) psychiatric history anxiety      Infectious Disease:  - neg infectious disease ROS       Malignancy:      - no malignancy   Other:    - neg other ROS                 Physical Exam  Normal systems: pulmonary and dental    Airway   Mallampati: II  TM distance: >3 FB  Neck ROM: full    Dental     Cardiovascular   Rhythm and rate: regular and normal      Pulmonary                         Anesthesia  Plan      History & Physical Review  History and physical reviewed and following examination; no interval change.    ASA Status:  2 .        Plan for General and ETT with Intravenous induction. Maintenance will be TIVA.    PONV prophylaxis:  Ondansetron (or other 5HT-3) and Dexamethasone or Solumedrol  Additional equipment: 2nd IV      Postoperative Care  Postoperative pain management:  Multi-modal analgesia, IV analgesics and Oral pain medications.      Consents  Anesthetic plan, risks, benefits and alternatives discussed with:  Patient..        Lupillo Linares MD  6:58 AM May 24, 2017                 .

## 2017-05-24 NOTE — OP NOTE
Gynecologic Oncology Operative Report      Patient: Joel Arthur  MRN: [id number]  DATE OF SURGERY: 5/24/2017    PREOPERATIVE DIAGNOSES:   Adenocarcinoma in situ    POSTOPERATIVE DIAGNOSES:   Same, final pathology pending    PROCEDURES:   Robotic-assisted total laparoscopic hysterectomy, bilateral salpingectomy, cystoscopy    SURGEON: Martha Talbert MD     ASSISTANTS: Paulina Diaz MD, fellow; Ana Aguayo MD, Ob/Gyn resident    ANESTHESIA: General endotracheal.     ESTIMATED BLOOD LOSS: 50 ml    IV FLUIDS: 1400 ml crystalloid    DRAINS: Sexton to dependent drainage productive of 400 ml urine at the end of the case. Catheter was removed prior to transfer to PACU.    INDICATIONS: The patient is a 36 year-old female with recent diagnosis of adenocarcinoma in situ of the cervix. On 2/17/17 she had a pap smear with atypical glandular cells, and was also HPV+. She had a colposcopy on 3/20/17 with cervical biopsies that demonstrated cervical adenocarcinoma, intestinal type, with unclear depth of invasion. She then underwent a cold knife cone that demonstrated adenocarcinoma in situ with negative surgical margins, as well as PEDRO I. Endometrial curettage was negative. She was counseled regarding management options and consented to undergo the procedures listed above.     FINDINGS:   1. Normal sized, anteverted, mobile uterus on EUA. Speculum exam with small amount of remaining cervix, consistent with previous cold knife cone.  2. Abdominal survey grossly unremarkable with normal appearing liver, diaphragm, stomach, omentum.       3. Grossly unremarkable bladder mucosa with brisk bilateral ureteral jets on cystoscopy.   4. All surgical pedicles hemostatic at end of case. Ovaries appeared well-perfused and viable at end of case.    SPECIMENS:   Uterus, cervix, right and left fallopian tubes    COMPLICATIONS: None.     CONDITION: Stable to PACU.     PROCEDURE: Following informed consent, the patient was taken to the operating  room, where she underwent general anesthesia without difficulty. She was placed in dorsal lithotomy position using the yellow fin stirrups and was prepped and draped in normal sterile fashion.    The anterior lip of the cervix was grasped with a tenaculum and the cervix gently dilated. A Vcare uterine manipulator was placed without difficulty in the usual fashion. Attention was then turned to the laparoscopic portion of the case. The umbilicus was everted using perforating towel clamps, and a stab incision was made through which a Veress needle was introduced intraperitoneally. The abdomen was insufflated with carbon dioxide gas. Opening pressure was less than 5 mm Hg. Incisions were infiltrated with 1% plain lidocaine with epinephrine (patient declined TAPs block). The Veress was removed, and a vertical skin incision was made approximately 4 cm above the umbilicus to accommodate an 8 mm trochar, which was placed without difficulty. The laparoscope was introduced, and no evidence of trauma beneath the port site was found. Abdominal survey was performed with findings as described above. The bilateral hemidiaphragms and liver were examined and found to be free of gross abnormalities. The lower abdomen and pelvis were likewise inspected and appeared to be normal.    The patient was placed in steep [place], and two additional 8 mm ports were placed in the left upper quadrant followed by a 5 mm AirSeal assistant port and another 8 mm robotic port in the right upper quadrant, all under direct visualization. The small bowel was swept into the upper abdomen. The Da-Sho was then docked and the robotic portion of the case was initiated.     The fallopian tubes were elevated and monopolar cautery used to transect the mesosalpinx and separate the tubes from the ovaries bilaterally. Next the utero-ovarian ligament was cauterized and transected bilaterally. The right ureter was easily identified. The right round ligament  was cauterized and divided at the pelvic sidewall using the monopolar scissors. Dissection proceeded further caudally and medially through the broad ligament to reach the level of the uterine vessels. The bladder flap was taken down, and the uterine vessels were skeletonized, sealed, and divided. Attention was turned to the patient's left side, where the same procedure was carried out after taking down some physiologic adhesions between the left pelvic sidewall and sigmoid colon. The bladder flap on the left side was joined to the right, and the uterosacral ligaments were cauterized and divided. The colpotomy was made anteriorly and carried out circumferentially over the uterine manipulator. The uterus, cervix, and bilateral fallopian tubes were removed via the vagina and sent to Pathology for permanent section. A vaginal occluder balloon was placed in the vagina and inflated to maintain pneumoperitoneum. The vaginal cuff was then closed using two sutures in a continuous overlapping fashion.   The vaginal occluder balloon was removed intact from the vagina. The Sexton catheter was then withdrawn and cystoscopy was performed. No evidence of injury or other abnormality was identified with regard to the bladder mucosa. Brisk efflux from the bilateral ureteral orifices was seen. The abdomen and pelvis were irrigated and verified to be free of any bleeding. The ovaries were inspected and appeared well-perfused and viable. The robot was undocked. All ports were removed, and the abdomen was exsufflated. Skin at each port site was closed with subcuticular stitches of 4-0 monocryl. Incisions were infiltrated with 1% lidocaine without epinephrine.    The patient tolerated the procedure well and was transferred to PACU in stable condition. All sponge, needle, and instrument counts were correct x 2.     Ana Aguayo MD  Gyn Oncology Service  PGY3 Resident     Martha Talbert MD    Department of Ob/Gyn and  Women's Health  Division of Gynecologic Oncology  [facility]  [phone]    I was scrubbed and present for the entire procedure.

## 2017-05-24 NOTE — IP AVS SNAPSHOT
Post Anesthesia Care Unit [address] [address]    Phone:  [phone]                                       After Visit Summary   5/24/2017    Joel Arthur    MRN: [id number]           After Visit Summary Signature Page     I have received my discharge instructions, and my questions have been answered. I have discussed any challenges I see with this plan with the nurse or doctor.    ..........................................................................................................................................  Patient/Patient Representative Signature      ..........................................................................................................................................  Patient Representative Print Name and Relationship to Patient    ..................................................               ................................................  Date                                            Time    ..........................................................................................................................................  Reviewed by Signature/Title    ...................................................              ..............................................  Date                                                            Time

## 2017-05-24 NOTE — DISCHARGE INSTRUCTIONS
REMEMBER: SAME DAY SURGERY IS NOT SAME DAY RECOVERY. TAKE IT EASY, GET PLENTY OF REST, AND HAVE SOMEONE WITH YOU FOR 24 HOURS. FOLLOW THESE INSTRUCTIONS AND PLEASE DO NOT HESITATE TO CALL WITH ANY QUESTIONS.    [facility], [place]  Same-Day Surgery   Adult Discharge Orders & Instructions     For 24 hours after surgery    1. Get plenty of rest.  A responsible adult must stay with you for at least 24 hours after you leave the hospital.   2. Do not drive or use heavy equipment.  If you have weakness or tingling, don't drive or use heavy equipment until this feeling goes away.  3. Do not drink alcohol.  4. Avoid strenuous or risky activities.  Ask for help when climbing stairs.   5. You may feel lightheaded.  IF so, sit for a few minutes before standing.  Have someone help you get up.   6. If you have nausea (feel sick to your stomach): Drink only clear liquids such as apple juice, ginger ale, broth or 7-Up.  Rest may also help.  Be sure to drink enough fluids.  Move to a regular diet as you feel able.  7. You may have a slight fever. Call the doctor if your fever is over 100 F (37.7 C) (taken under the tongue) or lasts longer than 24 hours.  8. You may have a dry mouth, a sore throat, muscle aches or trouble sleeping.  These should go away after 24 hours.  9. Do not make important or legal decisions.   Call your doctor for any of the followin.  Signs of infection (fever, growing tenderness at the surgery site, a large amount of drainage or bleeding, severe pain, foul-smelling drainage, redness, swelling).    2. It has been over 8 to 10 hours since surgery and you are still not able to urinate (pass water).    3.  Headache for over 24 hours.  To contact a doctor, call Dr Talbert's office at [phone] or:        [phone] and ask for the resident on call for Gynecology/Oncology (answered 24 hours a day)      Emergency Department:    [facility]: [phone]       (TTY for  hearing impaired: [phone])             Tips for taking pain medications  To get the best pain relief possible , remember these points:      Take pain medications as directed, before pain becomes severe      Pain medication can upset your stomach: taking it with food may help      Constipation is a common side effect of pain medication. Drink plenty of  Fluids      Eat foods high in fiber. Take a stool softener  if recommended by your doctor or  Pharmacist.       Ask about other ways to control pain, such as with heat, ice or relaxation.

## 2017-06-01 LAB — COPATH REPORT: NORMAL

## 2017-06-03 NOTE — PROGRESS NOTES
Good news-no residual adenocarcinoma in situ. Everything looks good!  Hope you are well.  Martha Talbert MD    Department of Ob/Gyn and Women's Health  Division of Gynecologic Oncology  [facility]  [phone]

## 2017-06-14 NOTE — PROGRESS NOTES
Consult Notes on Referred Patient    Date: 6/15/2017       Dr. Tish Gibson MD  [facility]  [address] [place] [address]       RE: Joel Arthur  : 1980  AJIT: 6/15/2017    Dear Dr. Tish Gibson:    I had the pleasure of seeing your patient Joel Arthur here at the Gynecologic Cancer Clinic at the [facility] on 06/15/17.  As you know she is a very pleasant 37 year old woman with a recent diagnosis of adenocarcinoma in situ, PEDRO I. She is s/p TLH-BSO on 17. She is here today for post-op visit.     HPI:    Lab Results   Component Value Date    PAP ATYP 2017    PAP OTHER-NIL, See Result 2016    PAP NIL 2013       Pap atypical glandular cells  17: HPV 18+    3/20/17  SPECIMEN(S):   A: Endocervical curettings   B: Cervical biopsy, 1-2 o'clock   C: Cervical biopsy, 3 o'clock   D: Cervical biopsy, 10 o'clock     FINAL DIAGNOSIS:   A. Cervix, endocervical curettings:   - Cervical adenocarcinoma, intestinal type - see description.     B. Cervix, 1-2 o'clock, biopsy:   - Cervical adenocarcinoma, intestinal type - see description.     C. Cervix, 3 o'clock, biopsy:   - Cervical glandular and squamous epithelium with no evidence of   dysplasia or malignancy.   - Moderate chronic inflammation with reactive epithelial changes.     D. Cervix, 10 o'clock, biopsy:   - Cervical glandular and squamous epithelium with no evidence of   dysplasia or malignancy.     17: cold knife cone biopsy with colposcopy   Final pathology:  SPECIMEN(S):   A: Cervical cone biopsy   B: Endocervical curettings   C: Periclitoral biopsy     FINAL DIAGNOSIS:   A. CERVIX, COLD KNIFE CONE BIOPSY:   - Adenocarcinoma in situ, intestinal type   - Low grade squamous intraepithelial lesion (cervical intraepithelial   neoplasia 1)   - Surgical margins negative for adenocarcinoma in-situ and squamous   intraepithelial lesion   - See comment     B. ENDOMETRIUM,  CURETTAGE:   - Proliferative endometrium negative for hyperplasia or atypia   - Cervical tissue with inflammatory and reactive change     C. VULVA, UDAY-CLITORIS, BIOPSY:   - Lichen sclerosus   - Negative for dysplasia       5/24/2017 Pathology from DaVinci assisted hysterectomy, salpingectomy.    FINAL DIAGNOSIS:   UTERUS, CERVIX, AND BILATERAL FALLOPIAN TUBES, ROBOTIC ASSISTED TOTAL   LAPAROSCOPIC HYSTERECTOMY AND BILATERAL SALPINGECTOMY:   - Cervix without residual squamous intraepithelial lesion or   adenocarcinoma in situ   - Cervix with prior biopsy site changes   - Mid secretory endometrium   - Myometrium and uterine serosa with no significant histologic   abnormality   - Right fallopian tube with paratubal cysts   - Left fallopian tube with no significant histologic abnormality   - No atypia or malignancy     Today: Joel reports to clinic today with her sister in law and she is feeling well. She is 3 wks s/p TLH-BSO. She reports some increasing pain around her umbilicus. I explained the risk of a hernia and that is extremely important for her to abstain from heavy lifting for at least 6 weeks post-op. No hernia was noted on physical exam.  Joel also reports daily spotting, mostly when she wipes. Otherwise no other vaginal bleeding or abnormal discharge. She is eating and drinking well, but she reports some abdominal pain after she eats solid foods. She is having normal bowel movements, no complaints of constipation or diarrhea. Denies urinary issues. Denies nausea or vomiting. No fever or chills. No pelvic pain, chest pain or SOB. Pathology report was discussed in detail. Patient elicited understanding of report as well as the need for annual pap smears.     Review of Systems:   Answers for HPI/ROS submitted by the patient on 6/11/2017   General Symptoms: No  Skin Symptoms: No  HENT Symptoms: No  EYE SYMPTOMS: No  HEART SYMPTOMS: No  LUNG SYMPTOMS: No  INTESTINAL SYMPTOMS: No  URINARY SYMPTOMS:  No  GYNECOLOGIC SYMPTOMS: No  BREAST SYMPTOMS: No  SKELETAL SYMPTOMS: No  BLOOD SYMPTOMS: No  NERVOUS SYSTEM SYMPTOMS: No  MENTAL HEALTH SYMPTOMS: No    I have reviewed and addressed the patient's review of symptoms for today's visit.     Past Medical History:    Past Medical History:   Diagnosis Date     Atypical glandular cells of undetermined significance (GERONIMO) on cervical Pap smear 02/17/2017    + HR HPV 18     Cervical high risk HPV (human papillomavirus) test positive 1/18/16    type 18     Chickenpox      H/O colposcopy with cervical biopsy 03/20/2017    Bx & ECC - cervical adenocarcinoma       Past Surgical History:    Past Surgical History:   Procedure Laterality Date     BIOPSY VULVA N/A 4/13/2017    Procedure: BIOPSY VULVA;  Surgeon: Martha Talbert MD;  Location: [facility] OR     COLPOSCOPY, CONIZATION, COMBINED N/A 4/13/2017    Procedure: COMBINED COLPOSCOPY, CONIZATION;  Surgeon: Martha Talbert MD;  Location:  OR     CYSTOSCOPY N/A 5/24/2017    Procedure: CYSTOSCOPY;;  Surgeon: Martha Talbert MD;  Location: [facility] OR     DAVINCI HYSTERECTOMY TOTAL, SALPINGECTOMY BILATERAL N/A 5/24/2017    Procedure: DAVINCI HYSTERECTOMY TOTAL, SALPINGECTOMY BILATERAL;  DaVinci Assisted Total Laparoscopic Hysterectomy, Bilateral Salpingectomy,  Cystoscopy, Anesthesia General;  Surgeon: Martha Talbert MD;  Location:  OR     MOUTH SURGERY      wisdom teeth     REMOVE INTRAUTERINE DEVICE N/A 4/13/2017    Procedure: REMOVE INTRAUTERINE DEVICE;  Surgeon: Martha Talbert MD;  Location:  OR         Health Maintenance:  There are no preventive care reminders to display for this patient.    Last Pap Smear: 2/17/17              Result: abnormal: atypical glandular cells  She has not had a history of abnormal Pap smears.    Last Mammogram: none              Result: not examined                  Current Medications:     has a current medication list which includes the following prescription(s):  "acetaminophen, ibuprofen, oxycodone, senna-docusate, brinzolamide-brimonidine, clobetasol, lorazepam, and multiple vitamins-minerals.     Allergies:     Allergies   Allergen Reactions     Diflucan [Fluconazole] Itching and Swelling     Tylenol W/Codeine [Acetaminophen-Codeine] Nausea and Vomiting       Social History:     Social History   Substance Use Topics     Smoking status: Former Smoker     Types: Cigarettes     Quit date: 1/1/2016     Smokeless tobacco: Never Used      Comment: quit 1/1/10     Alcohol use 0.0 oz/week     0 Standard drinks or equivalent per week      Comment: occas - 5-6 beers 2 times weekly       History   Drug Use No       Family History:     The patient's family history is notable for non.    Family History   Problem Relation Age of Onset     Thyroid Disease Mother      Lipids Mother      Depression Mother      HEART DISEASE Maternal Grandmother      MI     CANCER Maternal Grandfather      kidney       Physical Exam:     BP 98/69  Pulse 70  Temp 97.5  F (36.4  C)  Ht 1.575 m (5' 2\")  Wt 61.6 kg (135 lb 12.8 oz)  LMP 05/01/2017  SpO2 98%  BMI 24.84 kg/m2  Body mass index is 24.84 kg/(m^2).    General Appearance: healthy and alert, no distress     HEENT:  no thyromegaly, no palpable nodules or masses        Cardiovascular: regular rate and rhythm, no gallops, rubs or murmurs     Respiratory: lungs clear, no rales, rhonchi or wheezes, normal diaphragmatic excursion    Musculoskeletal: extremities non tender and without edema    Skin: no lesions or rashes     Neurological: normal gait, no gross defects     Psychiatric: appropriate mood and affect                               Hematological: normal cervical, supraclavicular and inguinal lymph nodes     Gastrointestinal:       abdomen soft, non-tender, non-distended, no organomegaly or masses, no signs of hernia, incisions are clean, dry and intact     Genitourinary: Deferred      Assessment:    Joel Arthur is a 37 year old woman with a " diagnosis of multifocal adenocarcinoma in situ, PEDRO I s/p TLH-BSO. She is here today for post-op visi.     Of a 15 minute appointment, more than 50% was spent in counseling the patient.    Plan:     1.)     S/p DaVinci assisted total laparoscopic hysterectomy, bilateral salpingectomy-no adenocarcinoma in situ on final pathology. Needs annual pap smear screening to continue due to diagnosis of AIS. Ms. Arthur will follow up with Dr. Clark in regards to hemochromatosis family history as well as annual pap smears. Reviewed signs and symptoms for when she should contact the clinic or seek additional care. Patient to contact the clinic with any questions or concerns in the interim.    2.) Genetic risk factors were assessed and the patient does not meet the qualifications for a referral.      3.) Labs and/or tests ordered include:  none.     4.) Health maintenance issues addressed today include none.     5.) Lichen sclerosis: instructed patient to continue with clobetasol as needed. If anything changes, instructed patient to contact clinic as she will need vulvar biopsy.     6.) Abdominal pain: recommended patient continue to take ibuprofen for possible inflammation. Also explained risk of hernia and stressed importance of avoiding any heavy lifting for 6 weeks post-op. Will write patient note for work as she is a  and needs more time off. Rx for 600 mg ibuprofen.     Thank you for allowing us to participate in the care of your patient.       Sincerely,    Martha Talbert MD    Department of Ob/Gyn and Women's Health  Division of Gynecologic Oncology  [facility]  [phone]    CC  Patient Care Team:  Danette Clark MD as PCP - General (Family Practice)  DANETTE CLARK    I have reviewed the above note and agree with the scribe's notation as written.    I, Jak Mills, am serving as a scribe to document services personally performed by Martha Talbert  MD, based upon my observations and the provider's statements to me. All documentation has been reviewed by the aforementioned doctor prior to being entered into the official medical record.

## 2017-06-15 ENCOUNTER — OFFICE VISIT (OUTPATIENT)
Dept: ONCOLOGY | Facility: CLINIC | Age: 37
End: 2017-06-15
Attending: OBSTETRICS & GYNECOLOGY
Payer: COMMERCIAL

## 2017-06-15 VITALS
HEIGHT: 62 IN | WEIGHT: 135.8 LBS | SYSTOLIC BLOOD PRESSURE: 98 MMHG | OXYGEN SATURATION: 98 % | BODY MASS INDEX: 24.99 KG/M2 | HEART RATE: 70 BPM | TEMPERATURE: 97.5 F | DIASTOLIC BLOOD PRESSURE: 69 MMHG

## 2017-06-15 DIAGNOSIS — D06.9 ADENOCARCINOMA IN SITU (AIS) OF UTERINE CERVIX: ICD-10-CM

## 2017-06-15 DIAGNOSIS — R87.810 CERVICAL HIGH RISK HPV (HUMAN PAPILLOMAVIRUS) TEST POSITIVE: Primary | ICD-10-CM

## 2017-06-15 PROCEDURE — 99213 OFFICE O/P EST LOW 20 MIN: CPT

## 2017-06-15 PROCEDURE — 99024 POSTOP FOLLOW-UP VISIT: CPT | Mod: ZP | Performed by: OBSTETRICS & GYNECOLOGY

## 2017-06-15 RX ORDER — IBUPROFEN 600 MG/1
600 TABLET, FILM COATED ORAL EVERY 6 HOURS PRN
Qty: 30 TABLET | Refills: 1 | Status: SHIPPED | OUTPATIENT
Start: 2017-06-15 | End: 2018-02-19

## 2017-06-15 NOTE — LETTER
Sherley 15, 2017        To Whom It May Concern:     Joel Arthur had surgery with me on 5/24/2017.  I anticipate her absence from work to be from 5/24/17 to  7/5/17, and the patient may return on a full-time basis.          Restrictions none upon return to work.           Sincerely,    TAL RESENDEZ MD    Department of Ob/Gyn and Women's Health  Division of Gynecologic Oncology  [facility]  [phone]

## 2017-06-15 NOTE — MR AVS SNAPSHOT
"              After Visit Summary   6/15/2017    Joel Arthur    MRN: [id number]           Patient Information     Date Of Birth          1980        Visit Information        Provider Department      6/15/2017 11:40 AM Martha Talbert MD [facility]        Today's Diagnoses     Cervical high risk HPV (human papillomavirus) test positive    -  1    Adenocarcinoma in situ (AIS) of uterine cervix           Follow-ups after your visit        Who to contact     If you have questions or need follow up information about today's clinic visit or your schedule please contact [facility] directly at [phone].  Normal or non-critical lab and imaging results will be communicated to you by MyChart, letter or phone within 4 business days after the clinic has received the results. If you do not hear from us within 7 days, please contact the clinic through [facility] or phone. If you have a critical or abnormal lab result, we will notify you by phone as soon as possible.  Submit refill requests through [facility] or call your pharmacy and they will forward the refill request to us. Please allow 3 business days for your refill to be completed.          Additional Information About Your Visit        MyChart Information     [facility] gives you secure access to your electronic health record. If you see a primary care provider, you can also send messages to your care team and make appointments. If you have questions, please call your primary care clinic.  If you do not have a primary care provider, please call [phone] and they will assist you.        Care EveryWhere ID     This is your Care EveryWhere ID. This could be used by other organizations to access your [place] medical records  [id number]        Your Vitals Were     Pulse Temperature Height Last Period Pulse Oximetry BMI (Body Mass Index)    70 97.5  F (36.4  C) 1.575 m (5' 2\") 05/01/2017 98% 24.84 kg/m2       Blood Pressure " from Last 3 Encounters:   06/15/17 98/69   05/24/17 97/62   04/27/17 105/69    Weight from Last 3 Encounters:   06/15/17 61.6 kg (135 lb 12.8 oz)   05/24/17 62.5 kg (137 lb 12.6 oz)   04/27/17 61.1 kg (134 lb 12.8 oz)              Today, you had the following     No orders found for display         Today's Medication Changes          These changes are accurate as of: 6/15/17 11:59 PM.  If you have any questions, ask your nurse or doctor.               These medicines have changed or have updated prescriptions.        Dose/Directions    * ibuprofen 600 MG tablet   Commonly known as:  ADVIL/MOTRIN   This may have changed:  Another medication with the same name was added. Make sure you understand how and when to take each.   Used for:  S/P laparoscopic hysterectomy        Dose:  600 mg   Take 1 tablet (600 mg) by mouth every 6 hours as needed for pain (mild)   Quantity:  30 tablet   Refills:  0       * ibuprofen 600 MG tablet   Commonly known as:  ADVIL/MOTRIN   This may have changed:  You were already taking a medication with the same name, and this prescription was added. Make sure you understand how and when to take each.   Used for:  Cervical high risk HPV (human papillomavirus) test positive, Adenocarcinoma in situ (AIS) of uterine cervix        Dose:  600 mg   Take 1 tablet (600 mg) by mouth every 6 hours as needed for moderate pain   Quantity:  30 tablet   Refills:  1       * Notice:  This list has 2 medication(s) that are the same as other medications prescribed for you. Read the directions carefully, and ask your doctor or other care provider to review them with you.         Where to get your medicines      These medications were sent to [id number] - [facility] [address] [place] [address]  [address] [facility] [address]     Phone:  [phone]     ibuprofen 600 MG tablet                Primary Care Provider Office Phone # Fax #    Tish Gibson [phone]        [facility] [place]  [facility] [address] [facility] [address]        Thank you!     Thank you for choosing [facility]  for your care. Our goal is always to provide you with excellent care. Hearing back from our patients is one way we can continue to improve our services. Please take a few minutes to complete the written survey that you may receive in the mail after your visit with us. Thank you!             Your Updated Medication List - Protect others around you: Learn how to safely use, store and throw away your medicines at www.disposemymeds.org.          This list is accurate as of: 6/15/17 11:59 PM.  Always use your most recent med list.                   Brand Name Dispense Instructions for use    acetaminophen 325 MG tablet    TYLENOL    100 tablet    Take 2 tablets (650 mg) by mouth every 4 hours as needed for other (mild pain)       clobetasol 0.05 % cream    TEMOVATE    60 g    Apply sparingly to affected area twice daily as needed.  Do not apply to face.       * ibuprofen 600 MG tablet    ADVIL/MOTRIN    30 tablet    Take 1 tablet (600 mg) by mouth every 6 hours as needed for pain (mild)       * ibuprofen 600 MG tablet    ADVIL/MOTRIN    30 tablet    Take 1 tablet (600 mg) by mouth every 6 hours as needed for moderate pain       LORazepam 0.5 MG tablet    ATIVAN    30 tablet    Take 1 tablet (0.5 mg) by mouth every 8 hours as needed for anxiety       oxyCODONE 5 MG IR tablet    ROXICODONE    30 tablet    Take 1-2 tablets (5-10 mg) by mouth every 3 hours as needed for pain or other (Moderate to Severe)       senna-docusate 8.6-50 MG per tablet    SENOKOT-S;PERICOLACE    30 tablet    Take 1-2 tablets by mouth 2 times daily Take while on oral narcotics to prevent or treat constipation.       SIMBRINZA OP          WOMENS MULTI VITAMIN & MINERAL PO      Take by mouth daily       * Notice:  This list has 2 medication(s) that are the same as other medications prescribed for you. Read the directions carefully,  and ask your doctor or other care provider to review them with you.

## 2017-06-15 NOTE — LETTER
6/15/2017       RE: Joel Arthur  [address] [facility] [address]     Dear Colleague,    Thank you for referring your patient, Joel Arthur, to the [facility]. Please see a copy of my visit note below.                            Consult Notes on Referred Patient    Date: 6/15/2017       Dr. Tish Gibson MD  [facility]  [address] [place] [address]       RE: Joel Arthur  : 1980  AJIT: 6/15/2017    Dear Dr. Tish Gibson:    I had the pleasure of seeing your patient Joel Arthur here at the Gynecologic Cancer Clinic at the [facility] on 06/15/17.  As you know she is a very pleasant 37 year old woman with a recent diagnosis of adenocarcinoma in situ, PEDRO I. She is s/p TLH-BSO on 17. She is here today for post-op visit.     HPI:    Lab Results   Component Value Date    PAP ATYP 2017    PAP OTHER-NIL, See Result 2016    PAP NIL 2013       Pap atypical glandular cells  17: HPV 18+    3/20/17  SPECIMEN(S):   A: Endocervical curettings   B: Cervical biopsy, 1-2 o'clock   C: Cervical biopsy, 3 o'clock   D: Cervical biopsy, 10 o'clock     FINAL DIAGNOSIS:   A. Cervix, endocervical curettings:   - Cervical adenocarcinoma, intestinal type - see description.     B. Cervix, 1-2 o'clock, biopsy:   - Cervical adenocarcinoma, intestinal type - see description.     C. Cervix, 3 o'clock, biopsy:   - Cervical glandular and squamous epithelium with no evidence of   dysplasia or malignancy.   - Moderate chronic inflammation with reactive epithelial changes.     D. Cervix, 10 o'clock, biopsy:   - Cervical glandular and squamous epithelium with no evidence of   dysplasia or malignancy.     17: cold knife cone biopsy with colposcopy   Final pathology:  SPECIMEN(S):   A: Cervical cone biopsy   B: Endocervical curettings   C: Periclitoral biopsy     FINAL DIAGNOSIS:   A. CERVIX, COLD KNIFE CONE BIOPSY:   - Adenocarcinoma in  situ, intestinal type   - Low grade squamous intraepithelial lesion (cervical intraepithelial   neoplasia 1)   - Surgical margins negative for adenocarcinoma in-situ and squamous   intraepithelial lesion   - See comment     B. ENDOMETRIUM, CURETTAGE:   - Proliferative endometrium negative for hyperplasia or atypia   - Cervical tissue with inflammatory and reactive change     C. VULVA, UDAY-CLITORIS, BIOPSY:   - Lichen sclerosus   - Negative for dysplasia       5/24/2017 Pathology from DaVinci assisted hysterectomy, salpingectomy.    FINAL DIAGNOSIS:   UTERUS, CERVIX, AND BILATERAL FALLOPIAN TUBES, ROBOTIC ASSISTED TOTAL   LAPAROSCOPIC HYSTERECTOMY AND BILATERAL SALPINGECTOMY:   - Cervix without residual squamous intraepithelial lesion or   adenocarcinoma in situ   - Cervix with prior biopsy site changes   - Mid secretory endometrium   - Myometrium and uterine serosa with no significant histologic   abnormality   - Right fallopian tube with paratubal cysts   - Left fallopian tube with no significant histologic abnormality   - No atypia or malignancy     Today: Joel reports to clinic today with her sister in law and she is feeling well. She is 3 wks s/p TLH-BSO. She reports some increasing pain around her umbilicus. I explained the risk of a hernia and that is extremely important for her to abstain from heavy lifting for at least 6 weeks post-op. No hernia was noted on physical exam.  Joel also reports daily spotting, mostly when she wipes. Otherwise no other vaginal bleeding or abnormal discharge. She is eating and drinking well, but she reports some abdominal pain after she eats solid foods. She is having normal bowel movements, no complaints of constipation or diarrhea. Denies urinary issues. Denies nausea or vomiting. No fever or chills. No pelvic pain, chest pain or SOB. Pathology report was discussed in detail. Patient elicited understanding of report as well as the need for annual pap smears.     Review of  Systems:   Answers for HPI/ROS submitted by the patient on 6/11/2017   General Symptoms: No  Skin Symptoms: No  HENT Symptoms: No  EYE SYMPTOMS: No  HEART SYMPTOMS: No  LUNG SYMPTOMS: No  INTESTINAL SYMPTOMS: No  URINARY SYMPTOMS: No  GYNECOLOGIC SYMPTOMS: No  BREAST SYMPTOMS: No  SKELETAL SYMPTOMS: No  BLOOD SYMPTOMS: No  NERVOUS SYSTEM SYMPTOMS: No  MENTAL HEALTH SYMPTOMS: No    I have reviewed and addressed the patient's review of symptoms for today's visit.     Past Medical History:    Past Medical History:   Diagnosis Date     Atypical glandular cells of undetermined significance (GERONIMO) on cervical Pap smear 02/17/2017    + HR HPV 18     Cervical high risk HPV (human papillomavirus) test positive 1/18/16    type 18     Chickenpox      H/O colposcopy with cervical biopsy 03/20/2017    Bx & ECC - cervical adenocarcinoma       Past Surgical History:    Past Surgical History:   Procedure Laterality Date     BIOPSY VULVA N/A 4/13/2017    Procedure: BIOPSY VULVA;  Surgeon: Martha Talbert MD;  [facility]     COLPOSCOPY, CONIZATION, COMBINED N/A 4/13/2017    Procedure: COMBINED COLPOSCOPY, CONIZATION;  Surgeon: Martha Talbert MD;  [facility]     CYSTOSCOPY N/A 5/24/2017    Procedure: CYSTOSCOPY;;  Surgeon: Martha Talbert MD;  Location: [facility] OR     DAVINCI HYSTERECTOMY TOTAL, SALPINGECTOMY BILATERAL N/A 5/24/2017    Procedure: DAVINCI HYSTERECTOMY TOTAL, SALPINGECTOMY BILATERAL;  DaVinci Assisted Total Laparoscopic Hysterectomy, Bilateral Salpingectomy,  Cystoscopy, Anesthesia General;  Surgeon: Martha Talbert MD;  Location: [facility] OR     MOUTH SURGERY      wisdom teeth     REMOVE INTRAUTERINE DEVICE N/A 4/13/2017    Procedure: REMOVE INTRAUTERINE DEVICE;  Surgeon: Martha Talbert MD;  Location:  OR         Health Maintenance:  There are no preventive care reminders to display for this patient.    Last Pap Smear: 2/17/17              Result: abnormal: atypical glandular cells  She has  "not had a history of abnormal Pap smears.    Last Mammogram: none              Result: not examined                  Current Medications:     has a current medication list which includes the following prescription(s): acetaminophen, ibuprofen, oxycodone, senna-docusate, brinzolamide-brimonidine, clobetasol, lorazepam, and multiple vitamins-minerals.     Allergies:     Allergies   Allergen Reactions     Diflucan [Fluconazole] Itching and Swelling     Tylenol W/Codeine [Acetaminophen-Codeine] Nausea and Vomiting       Social History:     Social History   Substance Use Topics     Smoking status: Former Smoker     Types: Cigarettes     Quit date: 1/1/2016     Smokeless tobacco: Never Used      Comment: quit 1/1/10     Alcohol use 0.0 oz/week     0 Standard drinks or equivalent per week      Comment: occas - 5-6 beers 2 times weekly       History   Drug Use No       Family History:     The patient's family history is notable for non.    Family History   Problem Relation Age of Onset     Thyroid Disease Mother      Lipids Mother      Depression Mother      HEART DISEASE Maternal Grandmother      MI     CANCER Maternal Grandfather      kidney       Physical Exam:     BP 98/69  Pulse 70  Temp 97.5  F (36.4  C)  Ht 1.575 m (5' 2\")  Wt 61.6 kg (135 lb 12.8 oz)  LMP 05/01/2017  SpO2 98%  BMI 24.84 kg/m2  Body mass index is 24.84 kg/(m^2).    General Appearance: healthy and alert, no distress     HEENT:  no thyromegaly, no palpable nodules or masses        Cardiovascular: regular rate and rhythm, no gallops, rubs or murmurs     Respiratory: lungs clear, no rales, rhonchi or wheezes, normal diaphragmatic excursion    Musculoskeletal: extremities non tender and without edema    Skin: no lesions or rashes     Neurological: normal gait, no gross defects     Psychiatric: appropriate mood and affect                               Hematological: normal cervical, supraclavicular and inguinal lymph nodes     Gastrointestinal:      "  abdomen soft, non-tender, non-distended, no organomegaly or masses, no signs of hernia, incisions are clean, dry and intact     Genitourinary: Deferred      Assessment:    Joel Arthur is a 37 year old woman with a diagnosis of multifocal adenocarcinoma in situ, PEDRO I s/p TLH-BSO. She is here today for post-op visi.     Of a 15 minute appointment, more than 50% was spent in counseling the patient.    Plan:     1.)     S/p DaVinci assisted total laparoscopic hysterectomy, bilateral salpingectomy-no adenocarcinoma in situ on final pathology. Needs annual pap smear screening to continue due to diagnosis of AIS. Ms. Arthur will follow up with Dr. Gibson in regards to hemochromatosis family history as well as annual pap smears. Reviewed signs and symptoms for when she should contact the clinic or seek additional care. Patient to contact the clinic with any questions or concerns in the interim.    2.) Genetic risk factors were assessed and the patient does not meet the qualifications for a referral.      3.) Labs and/or tests ordered include:  none.     4.) Health maintenance issues addressed today include none.     5.) Lichen sclerosis: instructed patient to continue with clobetasol as needed. If anything changes, instructed patient to contact clinic as she will need vulvar biopsy.     6.) Abdominal pain: recommended patient continue to take ibuprofen for possible inflammation. Also explained risk of hernia and stressed importance of avoiding any heavy lifting for 6 weeks post-op. Will write patient note for work as she is a  and needs more time off. Rx for 600 mg ibuprofen.     Thank you for allowing us to participate in the care of your patient.       Sincerely,    Martha Talbert MD    Department of Ob/Gyn and Women's Health  Division of Gynecologic Oncology  [facility]  [phone]    CC  Patient Care Team:  Tish Gibson MD as PCP - General (Family  Practice)    I have reviewed the above note and agree with the scribe's notation as written.    I, Jak Mills, am serving as a scribe to document services personally performed by Martha Talbert MD, based upon my observations and the provider's statements to me. All documentation has been reviewed by the aforementioned doctor prior to being entered into the official medical record.

## 2017-06-15 NOTE — NURSING NOTE
"Oncology Rooming Note    Sherley 15, 2017 11:46 AM   Joel Arthur is a 37 year old female who presents for:    Chief Complaint   Patient presents with     Oncology Clinic Visit     Hysterectomy done on 05/24/17 check up      Initial Vitals: BP 98/69  Pulse 70  Temp 97.5  F (36.4  C)  Ht 1.575 m (5' 2\")  Wt 61.6 kg (135 lb 12.8 oz)  LMP 05/01/2017  SpO2 98%  BMI 24.84 kg/m2 Estimated body mass index is 24.84 kg/(m^2) as calculated from the following:    Height as of this encounter: 1.575 m (5' 2\").    Weight as of this encounter: 61.6 kg (135 lb 12.8 oz). Body surface area is 1.64 meters squared.  Data Unavailable Comment: Data Unavailable   Patient's last menstrual period was 05/01/2017.  Allergies reviewed: Yes  Medications reviewed: Yes    Medications: Medication refills not needed today.  Pharmacy name entered into [facility]:    [id number] - [place], [place] - [address]  [id number] - [place], [place] - [address] [place] [address]    Clinical concerns: Pain behind belly button for the last couple of days. Pain: 5 Yes was notified.    10 minutes for nursing intake (face to face time)     Maria Luz Sheppard CMA              "

## 2017-06-21 ENCOUNTER — MYC MEDICAL ADVICE (OUTPATIENT)
Dept: FAMILY MEDICINE | Facility: CLINIC | Age: 37
End: 2017-06-21

## 2017-06-21 DIAGNOSIS — Z83.49 FH: HEMOCHROMATOSIS: Primary | ICD-10-CM

## 2017-06-30 ENCOUNTER — OFFICE VISIT (OUTPATIENT)
Dept: FAMILY MEDICINE | Facility: CLINIC | Age: 37
End: 2017-06-30
Payer: COMMERCIAL

## 2017-06-30 ENCOUNTER — TELEPHONE (OUTPATIENT)
Dept: FAMILY MEDICINE | Facility: CLINIC | Age: 37
End: 2017-06-30

## 2017-06-30 VITALS
WEIGHT: 136 LBS | SYSTOLIC BLOOD PRESSURE: 100 MMHG | DIASTOLIC BLOOD PRESSURE: 62 MMHG | HEART RATE: 79 BPM | TEMPERATURE: 98.6 F | BODY MASS INDEX: 24.87 KG/M2 | OXYGEN SATURATION: 100 %

## 2017-06-30 DIAGNOSIS — N90.4 LICHEN SCLEROSUS OF FEMALE GENITALIA: ICD-10-CM

## 2017-06-30 DIAGNOSIS — Z83.49 FH: HEMOCHROMATOSIS: Primary | ICD-10-CM

## 2017-06-30 DIAGNOSIS — D06.9 ADENOCARCINOMA IN SITU (AIS) OF UTERINE CERVIX: ICD-10-CM

## 2017-06-30 DIAGNOSIS — F41.1 GAD (GENERALIZED ANXIETY DISORDER): ICD-10-CM

## 2017-06-30 LAB — FERRITIN SERPL-MCNC: 42 NG/ML (ref 12–150)

## 2017-06-30 PROCEDURE — 99214 OFFICE O/P EST MOD 30 MIN: CPT | Performed by: FAMILY MEDICINE

## 2017-06-30 PROCEDURE — 36415 COLL VENOUS BLD VENIPUNCTURE: CPT | Performed by: FAMILY MEDICINE

## 2017-06-30 PROCEDURE — 82728 ASSAY OF FERRITIN: CPT | Performed by: FAMILY MEDICINE

## 2017-06-30 PROCEDURE — 81256 HFE GENE: CPT | Performed by: FAMILY MEDICINE

## 2017-06-30 RX ORDER — PROPRANOLOL HYDROCHLORIDE 10 MG/1
10 TABLET ORAL 3 TIMES DAILY PRN
Qty: 40 TABLET | Refills: 1 | Status: SHIPPED | OUTPATIENT
Start: 2017-06-30 | End: 2018-02-19

## 2017-06-30 NOTE — NURSING NOTE
"No chief complaint on file.      Initial /62  Pulse 79  Temp 98.6  F (37  C) (Tympanic)  Wt 136 lb (61.7 kg)  SpO2 100%  BMI 24.87 kg/m2 Estimated body mass index is 24.87 kg/(m^2) as calculated from the following:    Height as of 6/15/17: 5' 2\" (1.575 m).    Weight as of this encounter: 136 lb (61.7 kg).  Medication Reconciliation: complete    Health Maintenance that is potentially due pending provider review:  NONE    n/a        "

## 2017-06-30 NOTE — PATIENT INSTRUCTIONS
Use the propranolol for anxiety as needed  Watch blood pressure     I'll let you know about the testing

## 2017-06-30 NOTE — LETTER
[facility]  [address] [facility] [address]  Phone: [phone]    July 14, 2017    Joel Arthur  [address] [facility] [address]              Dear Ms. Arthur,                  Sincerely,      Tish Veloz MD

## 2017-06-30 NOTE — MR AVS SNAPSHOT
After Visit Summary   6/30/2017    Joel Arthur    MRN: [id number]           Patient Information     Date Of Birth          1980        Visit Information        Provider Department      6/30/2017 2:40 PM Tish Gibson MD [facility]        Today's Diagnoses     FH: hemochromatosis    -  1    Adenocarcinoma in situ (AIS) of uterine cervix        Lichen sclerosus of female genitalia        SORAYA (generalized anxiety disorder)          Care Instructions    Use the propranolol for anxiety as needed  Watch blood pressure     I'll let you know about the testing          Follow-ups after your visit        Who to contact     If you have questions or need follow up information about today's clinic visit or your schedule please contact [facility] directly at [phone].  Normal or non-critical lab and imaging results will be communicated to you by [employer], letter or phone within 4 business days after the clinic has received the results. If you do not hear from us within 7 days, please contact the clinic through [employer] or phone. If you have a critical or abnormal lab result, we will notify you by phone as soon as possible.  Submit refill requests through [employer] or call your pharmacy and they will forward the refill request to us. Please allow 3 business days for your refill to be completed.          Additional Information About Your Visit        MyChart Information     [employer] gives you secure access to your electronic health record. If you see a primary care provider, you can also send messages to your care team and make appointments. If you have questions, please call your primary care clinic.  If you do not have a primary care provider, please call [phone] and they will assist you.        Care EveryWhere ID     This is your Care EveryWhere ID. This could be used by other organizations to access your [facility] medical records  [id number]        Your Vitals Were      Pulse Temperature Pulse Oximetry BMI (Body Mass Index)          79 98.6  F (37  C) (Tympanic) 100% 24.87 kg/m2         Blood Pressure from Last 3 Encounters:   06/30/17 100/62   06/15/17 98/69   05/24/17 97/62    Weight from Last 3 Encounters:   06/30/17 136 lb (61.7 kg)   06/15/17 135 lb 12.8 oz (61.6 kg)   05/24/17 137 lb 12.6 oz (62.5 kg)              We Performed the Following     Ferritin     Hemochromatosis mutation          Today's Medication Changes          These changes are accurate as of: 6/30/17  3:27 PM.  If you have any questions, ask your nurse or doctor.               Start taking these medicines.        Dose/Directions    propranolol 10 MG tablet   Commonly known as:  INDERAL   Used for:  SORAYA (generalized anxiety disorder)   Started by:  Tish Gibson MD        Dose:  10 mg   Take 1 tablet (10 mg) by mouth 3 times daily as needed   Quantity:  40 tablet   Refills:  1            Where to get your medicines      These medications were sent to [facility] [address] [place] [address]  [address] [place] [address]     Phone:  [phone]     propranolol 10 MG tablet                Primary Care Provider Office Phone # Fax #    Tish Gibson [phone]       [facility] [address] [place] [address]        Equal Access to Services     TARSHA PRETTY AH: Hadii rajat staton hadasho Sonikko, waaxda luqadaha, qaybta kaalmada adeegyada, zhang knight. So [facility] [phone].    ATENCIÓN: Si habla español, tiene a travis disposición servicios gratuitos de asistencia lingüística. Rodrigo al [phone].    We comply with applicable federal civil rights laws and [place] laws. We do not discriminate on the basis of race, color, national origin, age, disability sex, sexual orientation or gender identity.            Thank you!     Thank you for choosing [facility]  for your care. Our goal is always to provide you  with excellent care. Hearing back from our patients is one way we can continue to improve our services. Please take a few minutes to complete the written survey that you may receive in the mail after your visit with us. Thank you!             Your Updated Medication List - Protect others around you: Learn how to safely use, store and throw away your medicines at www.disposemymeds.org.          This list is accurate as of: 6/30/17  3:27 PM.  Always use your most recent med list.                   Brand Name Dispense Instructions for use Diagnosis    acetaminophen 325 MG tablet    TYLENOL    100 tablet    Take 2 tablets (650 mg) by mouth every 4 hours as needed for other (mild pain)    S/P laparoscopic hysterectomy       clobetasol 0.05 % cream    TEMOVATE    60 g    Apply sparingly to affected area twice daily as needed.  Do not apply to face.    Adenocarcinoma in situ (AIS) of uterine cervix, Lichen sclerosus et atrophicus       * ibuprofen 600 MG tablet    ADVIL/MOTRIN    30 tablet    Take 1 tablet (600 mg) by mouth every 6 hours as needed for pain (mild)    S/P laparoscopic hysterectomy       * ibuprofen 600 MG tablet    ADVIL/MOTRIN    30 tablet    Take 1 tablet (600 mg) by mouth every 6 hours as needed for moderate pain    Cervical high risk HPV (human papillomavirus) test positive, Adenocarcinoma in situ (AIS) of uterine cervix       LORazepam 0.5 MG tablet    ATIVAN    30 tablet    Take 1 tablet (0.5 mg) by mouth every 8 hours as needed for anxiety    Adjustment disorder with anxious mood       oxyCODONE 5 MG IR tablet    ROXICODONE    30 tablet    Take 1-2 tablets (5-10 mg) by mouth every 3 hours as needed for pain or other (Moderate to Severe)    S/P laparoscopic hysterectomy       propranolol 10 MG tablet    INDERAL    40 tablet    Take 1 tablet (10 mg) by mouth 3 times daily as needed    SORAYA (generalized anxiety disorder)       senna-docusate 8.6-50 MG per tablet    SENOKOT-S;PERICOLACE    30 tablet     Take 1-2 tablets by mouth 2 times daily Take while on oral narcotics to prevent or treat constipation.    S/P laparoscopic hysterectomy       SIMBRINZA OP           WOMENS MULTI VITAMIN & MINERAL PO      Take by mouth daily        * Notice:  This list has 2 medication(s) that are the same as other medications prescribed for you. Read the directions carefully, and ask your doctor or other care provider to review them with you.

## 2017-06-30 NOTE — PROGRESS NOTES
SUBJECTIVE:                                                    Joel Arthur is a 37 year old female who presents to clinic today for the following health issues:      hemochromatosis    Was told to make an appointment for genetic test to check ferritin      She found out her mom has hemochromatosis, and she would like to check in to it. Her grandfather  of liver cancer so likely had it as well.     Cervical cancer-she had her cone biopsy done and cancer was contained, so went on to hysterectomy, with preservation of the ovaries    Lichen scleroses-she has a biopsy around clitoris and was lichen sclerosus. She has been using the steroid cream which is helping a lot    Anxiety-she has an anxiety disorder. Ativan helps. She has panic attacks.   She worries too much, thinks she is way more anxious than other people. But she isn't interested in an antidepressant. She likes something as needed, not necessarily ativan.      Problem list and histories reviewed & adjusted, as indicated.  Additional history: as documented    BP Readings from Last 3 Encounters:   17 100/62   06/15/17 98/69   17 97/62    Wt Readings from Last 3 Encounters:   17 136 lb (61.7 kg)   06/15/17 135 lb 12.8 oz (61.6 kg)   17 137 lb 12.6 oz (62.5 kg)                    Reviewed and updated as needed this visit by clinical staff  Tobacco  Allergies       Reviewed and updated as needed this visit by Provider         OBJECTIVE: /62  Pulse 79  Temp 98.6  F (37  C) (Tympanic)  Wt 136 lb (61.7 kg)  SpO2 100%  BMI 24.87 kg/m2   General: appears well, in no distress   Psych: Mood:euthymic,  Affect: labile, Speech:normal, Thought Processes: normal, Suicidal Ideation: No     ASSESSMENT:  1. FH: hemochromatosis    2. Adenocarcinoma in situ (AIS) of uterine cervix    3. Lichen sclerosus of female genitalia    4. SORAYA (generalized anxiety disorder)        PLAN:  Orders Placed This Encounter     Ferritin     Hemochromatosis  mutation     propranolol (INDERAL) 10 MG tablet      We discussed the genetic implications of testing, and that insurance may not pay for it.   Can check her current coverage. I do think she should be tested, and if patient is positive, her children should be checked. Can check ferritin today to look at her iron load  Start propranolol, Gone over benefits and risks, as well as side effects of medication. Watch blood pressures and notify me how going    Patient Instructions   Use the propranolol for anxiety as needed  Watch blood pressure     I'll let you know about the testing     Tish Veloz MD

## 2017-06-30 NOTE — TELEPHONE ENCOUNTER
Pt called her Ins and we are to do a Prior auth on her lab work for Gene Testing.   [employer] [phone]  -    Spoke with Maryjo at [employer]. Received Prior Auth completed and faxed in for pt. Waiting for response.  [facility] [place] [facility]

## 2017-07-05 LAB — COPATH REPORT: NORMAL

## 2017-07-06 PROBLEM — F41.1 GAD (GENERALIZED ANXIETY DISORDER): Status: ACTIVE | Noted: 2017-07-06

## 2017-07-07 ENCOUNTER — MYC MEDICAL ADVICE (OUTPATIENT)
Dept: FAMILY MEDICINE | Facility: CLINIC | Age: 37
End: 2017-07-07

## 2017-07-11 NOTE — TELEPHONE ENCOUNTER
Per Jamel they still working on this Prior Auth   Called left message at [phone] Opt 4.  Pt said they need further information.   [facility]

## 2017-07-11 NOTE — TELEPHONE ENCOUNTER
Requesting further Notes regarding Genetic Testing  Karen- [phone] Fax  [facility]

## 2017-07-14 NOTE — TELEPHONE ENCOUNTER
Spoke with Tanya she transferred me to [facility] [phone] [facility].  Did talk to pt also. Pt has not had any tests done for this. Mother is the only one who has.  [facility]

## 2017-07-14 NOTE — TELEPHONE ENCOUNTER
Per Karen from [facility]   Asked for OV note from 6/30/17, Lab results, problem History list.  [facility]- [place]-[phone]  [facility]

## 2017-07-14 NOTE — TELEPHONE ENCOUNTER
Dr. Veloz should you do  letter be generated regarding History.  Labs were drawn for the Testing already. Documentation of Labs.  Fax to [phone]  [facility]

## 2017-07-17 NOTE — TELEPHONE ENCOUNTER
Health Plan Authorization of Services Report received 7/17/17  Authorization number [id number]  Pt notified and sent to be scanned to this Enc.  [facility]

## 2017-08-07 ENCOUNTER — MYC MEDICAL ADVICE (OUTPATIENT)
Dept: FAMILY MEDICINE | Facility: CLINIC | Age: 37
End: 2017-08-07

## 2017-09-09 ENCOUNTER — MYC MEDICAL ADVICE (OUTPATIENT)
Dept: ONCOLOGY | Facility: CLINIC | Age: 37
End: 2017-09-09

## 2018-02-19 ENCOUNTER — OFFICE VISIT (OUTPATIENT)
Dept: FAMILY MEDICINE | Facility: CLINIC | Age: 38
End: 2018-02-19
Payer: COMMERCIAL

## 2018-02-19 VITALS
RESPIRATION RATE: 16 BRPM | HEART RATE: 79 BPM | DIASTOLIC BLOOD PRESSURE: 64 MMHG | SYSTOLIC BLOOD PRESSURE: 102 MMHG | BODY MASS INDEX: 26.31 KG/M2 | WEIGHT: 143 LBS | TEMPERATURE: 98 F | HEIGHT: 62 IN | OXYGEN SATURATION: 100 %

## 2018-02-19 DIAGNOSIS — Z00.00 ROUTINE GENERAL MEDICAL EXAMINATION AT A HEALTH CARE FACILITY: Primary | ICD-10-CM

## 2018-02-19 DIAGNOSIS — F41.1 GAD (GENERALIZED ANXIETY DISORDER): ICD-10-CM

## 2018-02-19 DIAGNOSIS — D06.9 ADENOCARCINOMA IN SITU (AIS) OF UTERINE CERVIX: ICD-10-CM

## 2018-02-19 DIAGNOSIS — H40.89 OTHER GLAUCOMA OF BOTH EYES: ICD-10-CM

## 2018-02-19 DIAGNOSIS — N89.8 VAGINAL LESION: ICD-10-CM

## 2018-02-19 PROCEDURE — 99213 OFFICE O/P EST LOW 20 MIN: CPT | Mod: 25 | Performed by: FAMILY MEDICINE

## 2018-02-19 PROCEDURE — 87624 HPV HI-RISK TYP POOLED RSLT: CPT | Performed by: FAMILY MEDICINE

## 2018-02-19 PROCEDURE — 99395 PREV VISIT EST AGE 18-39: CPT | Performed by: FAMILY MEDICINE

## 2018-02-19 PROCEDURE — 88175 CYTOPATH C/V AUTO FLUID REDO: CPT | Performed by: FAMILY MEDICINE

## 2018-02-19 RX ORDER — PROPRANOLOL HYDROCHLORIDE 10 MG/1
10 TABLET ORAL 3 TIMES DAILY PRN
Qty: 40 TABLET | Refills: 3 | Status: SHIPPED | OUTPATIENT
Start: 2018-02-19 | End: 2020-05-12

## 2018-02-19 ASSESSMENT — ANXIETY QUESTIONNAIRES
GAD7 TOTAL SCORE: 6
1. FEELING NERVOUS, ANXIOUS, OR ON EDGE: MORE THAN HALF THE DAYS
5. BEING SO RESTLESS THAT IT IS HARD TO SIT STILL: NOT AT ALL
2. NOT BEING ABLE TO STOP OR CONTROL WORRYING: SEVERAL DAYS
IF YOU CHECKED OFF ANY PROBLEMS ON THIS QUESTIONNAIRE, HOW DIFFICULT HAVE THESE PROBLEMS MADE IT FOR YOU TO DO YOUR WORK, TAKE CARE OF THINGS AT HOME, OR GET ALONG WITH OTHER PEOPLE: SOMEWHAT DIFFICULT
3. WORRYING TOO MUCH ABOUT DIFFERENT THINGS: SEVERAL DAYS
7. FEELING AFRAID AS IF SOMETHING AWFUL MIGHT HAPPEN: NOT AT ALL
6. BECOMING EASILY ANNOYED OR IRRITABLE: SEVERAL DAYS

## 2018-02-19 ASSESSMENT — PATIENT HEALTH QUESTIONNAIRE - PHQ9: 5. POOR APPETITE OR OVEREATING: SEVERAL DAYS

## 2018-02-19 NOTE — PATIENT INSTRUCTIONS
For constipation:   Miralax-draws water into the stool  Senna-natural stimulant-one twice a day  Fiber-use the chews, tabs  (long term)  Dulcolax-as needed, 2 at bedtime    I took a pap smear of the lesion. I will let you know what that shows. But likely you should have it removed. See Dr Hernandez to have it removed, you can see Dr Lance as well      Preventive Health Recommendations  Female Ages 26 - 39  Yearly exam:   See your health care provider every year in order to    Review health changes.     Discuss preventive care.      Review your medicines if you your doctor has prescribed any.    Until age 30: Get a Pap test every three years (more often if you have had an abnormal result).    After age 30: Talk to your doctor about whether you should have a Pap test every 3 years or have a Pap test with HPV screening every 5 years.   You do not need a Pap test if your uterus was removed (hysterectomy) and you have not had cancer.  You should be tested each year for STDs (sexually transmitted diseases), if you're at risk.   Talk to your provider about how often to have your cholesterol checked.  If you are at risk for diabetes, you should have a diabetes test (fasting glucose).  Shots: Get a flu shot each year. Get a tetanus shot every 10 years.   Nutrition:     Eat at least 5 servings of fruits and vegetables each day.    Eat whole-grain bread, whole-wheat pasta and brown rice instead of white grains and rice.    Talk to your provider about Calcium and Vitamin D.     Lifestyle    Exercise at least 150 minutes a week (30 minutes a day, 5 days of the week). This will help you control your weight and prevent disease.    Limit alcohol to one drink per day.    No smoking.     Wear sunscreen to prevent skin cancer.    See your dentist every six months for an exam and cleaning.    Treating Constipation    Constipation is a common and often uncomfortable problem. Constipation means you have bowel movements fewer than 3 times  per week, or strain to pass hard, dry stool. It can last a short time. Or it can be a problem that never seems to go away. The good news is that it can often be treated and controlled.  Eat more fiber  One of the best ways to help treat constipation is to increase your fiber intake. You can do this either through diet or by using fiber supplements. Fiber (in whole grains, fruits, and vegetables) adds bulk and absorbs water to soften the stool. This helps the stool pass through the colon more easily. When you increase your fiber intake, do it slowly to avoid side effects such as bloating. Also increase the amount of water that you drink. Eating more of the following foods can add fiber to your diet.    High-fiber cereals    Whole grains, bran, and brown rice    Vegetables such as carrots, broccoli, and greens    Fresh fruits (especially apples, pears, and dried fruits like raisins and apricots)    Nuts and legumes (especially beans such as lentils, kidney beans, and lima beans)  Get physically active  Exercise helps improve the working of your colon which helps ease constipation. Try to get some physical activity every day. If you haven t been active for a while, talk to your healthcare provider before starting again.  Laxatives  Your healthcare provider may suggest an over-the-counter product to help ease your constipation. He or she may suggest the use of bulk-forming agents or laxatives. The use of laxatives, if used as directed, is common and safe. Follow directions carefully when using them. See your healthcare provider for new-onset constipation, or long-term constipation, to rule out other causes such as medicines or thyroid disease.  Date Last Reviewed: 7/1/2016 2000-2017 [employer]. [address] [place] [address], [place] [address]. All rights reserved. This information is not intended as a substitute for professional medical care. Always follow your healthcare professional's  instructions.        Eating a High-Fiber Diet  Fiber is what gives strength and structure to plants. Most grains, beans, vegetables, and fruits contain fiber. Foods rich in fiber are often low in calories and fat, and they fill you up more. They may also reduce your risks for certain health problems. To find out the amount of fiber in canned, packaged, or frozen foods, read the Nutrition Facts label. It tells you how much fiber is in one serving.    Types of fiber and their benefits  There are two types of fiber: insoluble and soluble. They both aid digestion and help you maintain a healthy weight.    Insoluble fiber. This is found in whole grains, cereals, certain fruits and vegetables such as apple skin, corn, and carrots. Insoluble fiber may prevent constipation and reduce the risk for certain types of cancer. It is called insoluble because it does not dissolve in water.    Soluble fiber. This type of fiber is in oats, beans, and certain fruits and vegetables such as strawberries and peas. Soluble fiber can reduce cholesterol, which may help lower the risk for heart disease. It also helps control blood sugar levels.  Look for high-fiber foods  Try these foods to add fiber to your diet:    Whole-grain breads and cereals. Try to eat 6 to 8 ounces a day. Include wheat and oat bran cereals, whole-wheat muffins or toast, and corn tortillas in your meals.    Fruits. Try to eat 2 cups a day. Apples, oranges, strawberries, pears, and bananas are good sources. (Note: Fruit juice is low in fiber.)    Vegetables. Try to eat at least 2.5 cups a day. Add asparagus, carrots, broccoli, peas, and corn to your meals.    Beans. One cup of cooked lentils gives you over 15 grams of fiber. Try navy beans, lentils, and chickpeas.    Seeds. A small handful of seeds gives you about 3 grams of fiber. Try sunflower or isabella seeds.  Keep track of your fiber  Keep track of how much fiber you eat. Start by reading food labels. Then eat a  variety of foods high in fiber. As you start to eat more fiber, ask your healthcare provider how much water you should be drinking to keep your digestive system working smoothly.  Aim for a certain amount of fiber in your diet each day. If you are a woman, that amount is between 25 and 28 grams per day. Men should aim for 30 to 33 grams per day. After age 50, your daily fiber needs drop to 22 grams for women and 28 grams for men.  Before you reach for the fiber supplements, think about this. Fiber is found naturally in healthy whole foods. It gives you that feeling of fullness after you eat. Taking fiber supplements or eating fiber-enriched foods will not give you this full feeling.  Your fiber intake is a good measure for the quality of your overall diet. If you are missing out on your daily amount of fiber, you may be lacking other important nutrients as well.  Date Last Reviewed: 6/1/2017 2000-2017 [employer]. [address] [place] [address], [place] [address]. All rights reserved. This information is not intended as a substitute for professional medical care. Always follow your healthcare professional's instructions.

## 2018-02-19 NOTE — PROGRESS NOTES
SUBJECTIVE:   CC: Joel Arthur is an 37 year old woman who presents for preventive health visit.     Healthy Habits:    Do you get at least three servings of calcium containing foods daily (dairy, green leafy vegetables, etc.)? yes    Amount of exercise or daily activities, outside of work: 5 day(s) per week    Problems taking medications regularly No    Medication side effects: No    Have you had an eye exam in the past two years? yes    Do you see a dentist twice per year? yes    Do you have sleep apnea, excessive snoring or daytime drowsiness? no    Anxiety- propranolol 10 mg tid, ativan .5mg  She reports that she takes 1 propranolol about twice a week for anxiety. She gets dizzy if she takes it before she works out, so she is careful about when she takes it.     She feels that she has been very emotional since she had her hysterectomy. She is not sure if this is related to her hysterectomy. She has talked to OB/GYN, but they were not sure either, but didn't think it was hormone related. She also has gained some weight that concerns her. Did quit smoking.    Constipation-  She has been taking a woman's laxative, but still feels constipated. She has been drinking a green shake that is supposed to give her her greens. She doesn't think that she drinks very much water.     Vaginal bleeding-  She was having vaginal bleeding last week after intercourse. This was new for her and she was concerned.      Today's PHQ-2 Score:   PHQ-2 ( 1999 Pf[employer]) 2/17/2017 1/18/2016   Q1: Little interest or pleasure in doing things 0 0   Q2: Feeling down, depressed or hopeless 0 0   PHQ-2 Score 0 0       Abuse: Current or Past(Physical, Sexual or Emotional)- No  Do you feel safe in your environment - Yes    Social History   Substance Use Topics     Smoking status: Former Smoker     Types: Cigarettes     Quit date: 1/1/2016     Smokeless tobacco: Never Used      Comment: quit 1/1/10     Alcohol use 0.0 oz/week     0 Standard drinks  "or equivalent per week      Comment: occas - 5-6 beers 2 times weekly     If you drink alcohol do you typically have >3 drinks per day or >7 drinks per week? No                     Reviewed orders with patient.  Reviewed health maintenance and updated orders accordingly - Yes  BP Readings from Last 3 Encounters:   02/19/18 102/64   06/30/17 100/62   06/15/17 98/69    Wt Readings from Last 3 Encounters:   02/19/18 143 lb (64.9 kg)   06/30/17 136 lb (61.7 kg)   06/15/17 135 lb 12.8 oz (61.6 kg)           Mammogram not appropriate for this patient based on age.    Pertinent mammograms are reviewed under the imaging tab.  History of abnormal Pap smear:   Last 3 Pap and HPV Results:   PAP / HPV Latest Ref Rng & Units 2/17/2017 1/18/2016 7/9/2013   PAP - ATYP(A) OTHER-NIL, See Result NIL   HPV 16 DNA NEG Negative Negative -   HPV 18 DNA NEG Positive(A) Positive(A) -   OTHER HR HPV NEG Negative Negative -       Reviewed and updated as needed this visit by clinical staff  Tobacco  Allergies  Meds  Med Hx  Surg Hx  Fam Hx  Soc Hx        Reviewed and updated as needed this visit by Provider            ROS:  C: NEGATIVE for fever, chills, change in weight  I: NEGATIVE for worrisome rashes, moles or lesions  E: NEGATIVE for vision changes or irritation  ENT: NEGATIVE for ear, mouth and throat problems  R: NEGATIVE for significant cough or SOB  B: NEGATIVE for masses, tenderness or discharge  CV: NEGATIVE for chest pain, palpitations or peripheral edema  GI: NEGATIVE for nausea, abdominal pain, heartburn, or change in bowel habits  : NEGATIVE for unusual urinary or vaginal symptoms. Periods are regular.  M: NEGATIVE for significant arthralgias or myalgia  N: NEGATIVE for weakness, dizziness or paresthesias  P: NEGATIVE for changes in mood or affect    OBJECTIVE:   /64 (BP Location: Right arm)  Pulse 79  Temp 98  F (36.7  C) (Tympanic)  Resp 16  Ht 5' 2\" (1.575 m)  Wt 143 lb (64.9 kg)  LMP 05/01/2017  SpO2 " 100%  BMI 26.16 kg/m2  EXAM:  GENERAL: healthy, alert and no distress  EYES: Eyes grossly normal to inspection, PERRL and conjunctivae and sclerae normal  HENT: ear canals and TM's normal, nose and mouth without ulcers or lesions  NECK: no adenopathy, no asymmetry, masses, or scars and thyroid normal to palpation  RESP: lungs clear to auscultation - no rales, rhonchi or wheezes  BREAST: normal without masses, tenderness or nipple discharge and no palpable axillary masses or adenopathy  CV: regular rate and rhythm, normal S1 S2, no S3 or S4, no murmur, click or rub, no peripheral edema and peripheral pulses strong  ABDOMEN: soft, nontender, no hepatosplenomegaly, no masses and bowel sounds normal   (female): normal female external genitalia, vaginal walls normal, there is a one cm area of a friable erythematous lesion,  bimanual exam reveals surgically absent uterus, adnexa without mass or tenderness   MS: no gross musculoskeletal defects noted, no edema  SKIN: no suspicious lesions or rashes  NEURO: Normal strength and tone, mentation intact and speech normal  PSYCH: mentation appears normal, affect normal/bright    ASSESSMENT/PLAN:   Joel was seen today for physical.    Diagnoses and all orders for this visit:    Routine general medical examination at a health care facility    Adenocarcinoma in situ (AIS) of uterine cervix  -     OFFICE/OUTPT VISIT,EST,LEVL III    SORAYA (generalized anxiety disorder)  -     propranolol (INDERAL) 10 MG tablet; Take 1 tablet (10 mg) by mouth 3 times daily as needed  -     OFFICE/OUTPT VISIT,EST,LEVL III    Other glaucoma of both eyes    Vaginal lesion  -     OB/GYN REFERRAL  -     OFFICE/OUTPT VISIT,EST,LEVL III    Other orders  -     Pap imaged thin layer diagnostic with HPV (select HPV order below)      Recommend she see Ob/gyn for the vaginal lesion, I suspect it is granulation tissue but should be removed     COUNSELING:   Reviewed preventive health counseling, as reflected in  "patient instructions       Regular exercise       Healthy diet/nutrition       Vision screening       Contraception       Safe sex practices/STD prevention       HIV screeninx in teen years, 1x in adult years, and at intervals if high risk     reports that she quit smoking about 2 years ago. Her smoking use included Cigarettes. She has never used smokeless tobacco.  Estimated body mass index is 26.16 kg/(m^2) as calculated from the following:    Height as of this encounter: 5' 2\" (1.575 m).    Weight as of this encounter: 143 lb (64.9 kg).     Counseling Resources:  ATP IV Guidelines  Pooled Cohorts Equation Calculator  Breast Cancer Risk Calculator  FRAX Risk Assessment  ICSI Preventive Guidelines  Dietary Guidelines for Americans,   [employer]'s MyPlate  ASA Prophylaxis  Lung CA Screening    This document serves as a record of the services and decisions personally performed and made by Tish Gibson MD. It was created on her behalf by Mandie Leiva, a trained medical scribe. The creation of this document is based the provider's statements to the medical scribe.  Mandie Leiva 12:53 PM 2018    Provider:   The information in this document, created by the medical scribe for me, accurately reflects the services I personally performed and the decisions made by me. I have reviewed and approved this document for accuracy prior to leaving the patient care area.  Tish Gibson MD 12:53 PM 2018    Tish Gibson MD  [facility] [place]  "

## 2018-02-19 NOTE — MR AVS SNAPSHOT
After Visit Summary   2/19/2018    Joel Arthur    MRN: [id number]           Patient Information     Date Of Birth          1980        Visit Information        Provider Department      2/19/2018 12:40 PM Tish Gibson MD [facility] [place]        Today's Diagnoses     Routine general medical examination at a health care facility    -  1    Adenocarcinoma in situ (AIS) of uterine cervix        SORAYA (generalized anxiety disorder)        Other glaucoma of both eyes        Vaginal lesion          Care Instructions    For constipation:   Miralax-draws water into the stool  Senna-natural stimulant-one twice a day  Fiber-use the chews, tabs  (long term)  Dulcolax-as needed, 2 at bedtime    I took a pap smear of the lesion. I will let you know what that shows. But likely you should have it removed. See Dr Hernandez to have it removed, you can see Dr Lance as well      Preventive Health Recommendations  Female Ages 26 - 39  Yearly exam:   See your health care provider every year in order to    Review health changes.     Discuss preventive care.      Review your medicines if you your doctor has prescribed any.    Until age 30: Get a Pap test every three years (more often if you have had an abnormal result).    After age 30: Talk to your doctor about whether you should have a Pap test every 3 years or have a Pap test with HPV screening every 5 years.   You do not need a Pap test if your uterus was removed (hysterectomy) and you have not had cancer.  You should be tested each year for STDs (sexually transmitted diseases), if you're at risk.   Talk to your provider about how often to have your cholesterol checked.  If you are at risk for diabetes, you should have a diabetes test (fasting glucose).  Shots: Get a flu shot each year. Get a tetanus shot every 10 years.   Nutrition:     Eat at least 5 servings of fruits and vegetables each day.    Eat whole-grain bread, whole-wheat pasta and brown  rice instead of white grains and rice.    Talk to your provider about Calcium and Vitamin D.     Lifestyle    Exercise at least 150 minutes a week (30 minutes a day, 5 days of the week). This will help you control your weight and prevent disease.    Limit alcohol to one drink per day.    No smoking.     Wear sunscreen to prevent skin cancer.    See your dentist every six months for an exam and cleaning.    Treating Constipation    Constipation is a common and often uncomfortable problem. Constipation means you have bowel movements fewer than 3 times per week, or strain to pass hard, dry stool. It can last a short time. Or it can be a problem that never seems to go away. The good news is that it can often be treated and controlled.  Eat more fiber  One of the best ways to help treat constipation is to increase your fiber intake. You can do this either through diet or by using fiber supplements. Fiber (in whole grains, fruits, and vegetables) adds bulk and absorbs water to soften the stool. This helps the stool pass through the colon more easily. When you increase your fiber intake, do it slowly to avoid side effects such as bloating. Also increase the amount of water that you drink. Eating more of the following foods can add fiber to your diet.    High-fiber cereals    Whole grains, bran, and brown rice    Vegetables such as carrots, broccoli, and greens    Fresh fruits (especially apples, pears, and dried fruits like raisins and apricots)    Nuts and legumes (especially beans such as lentils, kidney beans, and lima beans)  Get physically active  Exercise helps improve the working of your colon which helps ease constipation. Try to get some physical activity every day. If you haven t been active for a while, talk to your healthcare provider before starting again.  Laxatives  Your healthcare provider may suggest an over-the-counter product to help ease your constipation. He or she may suggest the use of bulk-forming  agents or laxatives. The use of laxatives, if used as directed, is common and safe. Follow directions carefully when using them. See your healthcare provider for new-onset constipation, or long-term constipation, to rule out other causes such as medicines or thyroid disease.  Date Last Reviewed: 7/1/2016 2000-2017 [employer]. [address] [place] [address], [place] [address]. All rights reserved. This information is not intended as a substitute for professional medical care. Always follow your healthcare professional's instructions.        Eating a High-Fiber Diet  Fiber is what gives strength and structure to plants. Most grains, beans, vegetables, and fruits contain fiber. Foods rich in fiber are often low in calories and fat, and they fill you up more. They may also reduce your risks for certain health problems. To find out the amount of fiber in canned, packaged, or frozen foods, read the Nutrition Facts label. It tells you how much fiber is in one serving.    Types of fiber and their benefits  There are two types of fiber: insoluble and soluble. They both aid digestion and help you maintain a healthy weight.    Insoluble fiber. This is found in whole grains, cereals, certain fruits and vegetables such as apple skin, corn, and carrots. Insoluble fiber may prevent constipation and reduce the risk for certain types of cancer. It is called insoluble because it does not dissolve in water.    Soluble fiber. This type of fiber is in oats, beans, and certain fruits and vegetables such as strawberries and peas. Soluble fiber can reduce cholesterol, which may help lower the risk for heart disease. It also helps control blood sugar levels.  Look for high-fiber foods  Try these foods to add fiber to your diet:    Whole-grain breads and cereals. Try to eat 6 to 8 ounces a day. Include wheat and oat bran cereals, whole-wheat muffins or toast, and corn tortillas in your meals.    Fruits. Try to eat 2 cups a day.  Apples, oranges, strawberries, pears, and bananas are good sources. (Note: Fruit juice is low in fiber.)    Vegetables. Try to eat at least 2.5 cups a day. Add asparagus, carrots, broccoli, peas, and corn to your meals.    Beans. One cup of cooked lentils gives you over 15 grams of fiber. Try navy beans, lentils, and chickpeas.    Seeds. A small handful of seeds gives you about 3 grams of fiber. Try sunflower or isabella seeds.  Keep track of your fiber  Keep track of how much fiber you eat. Start by reading food labels. Then eat a variety of foods high in fiber. As you start to eat more fiber, ask your healthcare provider how much water you should be drinking to keep your digestive system working smoothly.  Aim for a certain amount of fiber in your diet each day. If you are a woman, that amount is between 25 and 28 grams per day. Men should aim for 30 to 33 grams per day. After age 50, your daily fiber needs drop to 22 grams for women and 28 grams for men.  Before you reach for the fiber supplements, think about this. Fiber is found naturally in healthy whole foods. It gives you that feeling of fullness after you eat. Taking fiber supplements or eating fiber-enriched foods will not give you this full feeling.  Your fiber intake is a good measure for the quality of your overall diet. If you are missing out on your daily amount of fiber, you may be lacking other important nutrients as well.  Date Last Reviewed: 6/1/2017 2000-2017 [employer]. [address] [place] [address]. All rights reserved. This information is not intended as a substitute for professional medical care. Always follow your healthcare professional's instructions.                Follow-ups after your visit        Additional Services     OB/GYN REFERRAL       Your provider has referred you to:  [facility]: [facility] [place] [phone]   [email or web address]    Please be aware that coverage of  "these services is subject to the terms and limitations of your health insurance plan.  Call member services at your health plan with any benefit or coverage questions.      Please bring the following with you to your appointment:    (1) Any X-Rays, CTs or MRIs which have been performed.  Contact the facility where they were done to arrange for  prior to your scheduled appointment.   (2) List of current medications   (3) This referral request   (4) Any documents/labs given to you for this referral                  Who to contact     If you have questions or need follow up information about today's clinic visit or your schedule please contact [facility] directly at [phone].  Normal or non-critical lab and imaging results will be communicated to you by MyChart, letter or phone within 4 business days after the clinic has received the results. If you do not hear from us within 7 days, please contact the clinic through [facility] or phone. If you have a critical or abnormal lab result, we will notify you by phone as soon as possible.  Submit refill requests through Pharmly or call your pharmacy and they will forward the refill request to us. Please allow 3 business days for your refill to be completed.          Additional Information About Your Visit        [employer]scape[facility]olf121 Information     Pharmly gives you secure access to your electronic health record. If you see a primary care provider, you can also send messages to your care team and make appointments. If you have questions, please call your primary care clinic.  If you do not have a primary care provider, please call [phone] and they will assist you.        Care EveryWhere ID     This is your Care EveryWhere ID. This could be used by other organizations to access your [facility] medical records  [id number]        Your Vitals Were     Pulse Temperature Respirations Height Last Period Pulse Oximetry    79 98  F (36.7  C) (Tympanic) 16 5' 2\" (1.575 " m) 05/01/2017 100%    BMI (Body Mass Index)                   26.16 kg/m2            Blood Pressure from Last 3 Encounters:   02/19/18 102/64   06/30/17 100/62   06/15/17 98/69    Weight from Last 3 Encounters:   02/19/18 143 lb (64.9 kg)   06/30/17 136 lb (61.7 kg)   06/15/17 135 lb 12.8 oz (61.6 kg)              We Performed the Following     OB/GYN REFERRAL     Pap imaged thin layer diagnostic with HPV (select HPV order below)          Today's Medication Changes          These changes are accurate as of 2/19/18  1:25 PM.  If you have any questions, ask your nurse or doctor.               Stop taking these medicines if you haven't already. Please contact your care team if you have questions.     ibuprofen 600 MG tablet   Commonly known as:  ADVIL/MOTRIN   Stopped by:  Tish Gibson MD           oxyCODONE IR 5 MG tablet   Commonly known as:  ROXICODONE   Stopped by:  Tish Gibson MD           senna-docusate 8.6-50 MG per tablet   Commonly known as:  SENOKOT-S;PERICOLACE   Stopped by:  Tish Gibson MD                Where to get your medicines      These medications were sent to [facility] [address] [place] [address]  [address] [place] [id number]     Phone:  [phone]     propranolol 10 MG tablet                Primary Care Provider Office Phone # Fax #    Tish Gibson [phone]       [address] [place] [address]        Equal Access to Services     [facility] : Haddell pappaso Sonikko, waaxda luqadaha, qaybta kaalmada adeegyaflash, zhang knight. So [facility] [phone].    ATENCIÓN: Si habla español, tiene a travis disposición servicios gratuitos de asistencia lingüística. Llame al [phone].    We comply with applicable federal civil rights laws and [place] laws. We do not discriminate on the basis of race, color, national origin, age, disability, sex, sexual orientation, or gender identity.             Thank you!     Thank you for choosing [facility]  for your care. Our goal is always to provide you with excellent care. Hearing back from our patients is one way we can continue to improve our services. Please take a few minutes to complete the written survey that you may receive in the mail after your visit with us. Thank you!             Your Updated Medication List - Protect others around you: Learn how to safely use, store and throw away your medicines at www.disposemymeds.org.          This list is accurate as of 2/19/18  1:25 PM.  Always use your most recent med list.                   Brand Name Dispense Instructions for use Diagnosis    acetaminophen 325 MG tablet    TYLENOL    100 tablet    Take 2 tablets (650 mg) by mouth every 4 hours as needed for other (mild pain)    S/P laparoscopic hysterectomy       clobetasol 0.05 % cream    TEMOVATE    60 g    Apply sparingly to affected area twice daily as needed.  Do not apply to face.    Adenocarcinoma in situ (AIS) of uterine cervix, Lichen sclerosus et atrophicus       LORazepam 0.5 MG tablet    ATIVAN    30 tablet    Take 1 tablet (0.5 mg) by mouth every 8 hours as needed for anxiety    Adjustment disorder with anxious mood       propranolol 10 MG tablet    INDERAL    40 tablet    Take 1 tablet (10 mg) by mouth 3 times daily as needed    SORAYA (generalized anxiety disorder)       SIMBRINZA OP           WOMENS MULTI VITAMIN & MINERAL PO      Take by mouth daily

## 2018-02-19 NOTE — NURSING NOTE
"Chief Complaint   Patient presents with     Physical     yearly       Initial /64 (BP Location: Right arm)  Pulse 79  Temp 98  F (36.7  C) (Tympanic)  Resp 16  Ht 5' 2\" (1.575 m)  Wt 143 lb (64.9 kg)  LMP 05/01/2017  SpO2 100%  BMI 26.16 kg/m2 Estimated body mass index is 26.16 kg/(m^2) as calculated from the following:    Height as of this encounter: 5' 2\" (1.575 m).    Weight as of this encounter: 143 lb (64.9 kg).      Health Maintenance that is potentially due pending provider review:  NONE    n/a    Is there anyone who you would like to be able to receive your results? No  If yes have patient fill out KATTY    "

## 2018-02-20 ASSESSMENT — PATIENT HEALTH QUESTIONNAIRE - PHQ9: SUM OF ALL RESPONSES TO PHQ QUESTIONS 1-9: 2

## 2018-02-20 ASSESSMENT — ANXIETY QUESTIONNAIRES: GAD7 TOTAL SCORE: 6

## 2018-02-21 LAB
COPATH REPORT: NORMAL
PAP: NORMAL

## 2018-02-23 ENCOUNTER — TELEPHONE (OUTPATIENT)
Dept: FAMILY MEDICINE | Facility: CLINIC | Age: 38
End: 2018-02-23

## 2018-02-23 LAB
FINAL DIAGNOSIS: NORMAL
HPV HR 12 DNA CVX QL NAA+PROBE: NEGATIVE
HPV16 DNA SPEC QL NAA+PROBE: NEGATIVE
HPV18 DNA SPEC QL NAA+PROBE: NEGATIVE
SPECIMEN DESCRIPTION: NORMAL
SPECIMEN SOURCE CVX/VAG CYTO: NORMAL

## 2018-02-23 NOTE — TELEPHONE ENCOUNTER
Reason for Call:  Other     Detailed comments: Patient is wanting her lab results - please call pt    Phone Number Patient can be reached at: Home number on file [phone] (home)    Best Time:     Can we leave a detailed message on this number? YES    Call taken on 2/23/2018 at 11:26 AM by Tish Han

## 2018-02-23 NOTE — TELEPHONE ENCOUNTER
Patient notified that Pap tracking nurse will call her with official results, but appears that HPV was negative and pap appears normal.  Patient verbalizes understanding.

## 2018-04-15 ENCOUNTER — MYC MEDICAL ADVICE (OUTPATIENT)
Dept: FAMILY MEDICINE | Facility: CLINIC | Age: 38
End: 2018-04-15

## 2018-04-15 DIAGNOSIS — L70.0 ACNE VULGARIS: Primary | ICD-10-CM

## 2018-04-16 RX ORDER — ADAPALENE 0.1 G/100G
CREAM TOPICAL AT BEDTIME
Qty: 45 G | Refills: 11 | Status: SHIPPED | OUTPATIENT
Start: 2018-04-16 | End: 2019-02-28

## 2019-02-28 ENCOUNTER — OFFICE VISIT (OUTPATIENT)
Dept: FAMILY MEDICINE | Facility: CLINIC | Age: 39
End: 2019-02-28
Payer: COMMERCIAL

## 2019-02-28 VITALS
BODY MASS INDEX: 24.66 KG/M2 | TEMPERATURE: 98.8 F | WEIGHT: 134 LBS | HEART RATE: 78 BPM | HEIGHT: 62 IN | SYSTOLIC BLOOD PRESSURE: 102 MMHG | DIASTOLIC BLOOD PRESSURE: 60 MMHG | RESPIRATION RATE: 16 BRPM | OXYGEN SATURATION: 99 %

## 2019-02-28 DIAGNOSIS — R21 SKIN RASH: ICD-10-CM

## 2019-02-28 DIAGNOSIS — D06.9 ADENOCARCINOMA IN SITU (AIS) OF UTERINE CERVIX: ICD-10-CM

## 2019-02-28 DIAGNOSIS — Z00.00 ROUTINE GENERAL MEDICAL EXAMINATION AT A HEALTH CARE FACILITY: Primary | ICD-10-CM

## 2019-02-28 PROBLEM — C53.9 ADENOCARCINOMA OF CERVIX (H): Status: ACTIVE | Noted: 2019-02-28

## 2019-02-28 PROCEDURE — 87624 HPV HI-RISK TYP POOLED RSLT: CPT | Performed by: FAMILY MEDICINE

## 2019-02-28 PROCEDURE — 99395 PREV VISIT EST AGE 18-39: CPT | Performed by: FAMILY MEDICINE

## 2019-02-28 PROCEDURE — 88175 CYTOPATH C/V AUTO FLUID REDO: CPT | Performed by: FAMILY MEDICINE

## 2019-02-28 RX ORDER — TRIAMCINOLONE ACETONIDE 1 MG/G
CREAM TOPICAL
Qty: 30 G | Refills: 1 | Status: SHIPPED | OUTPATIENT
Start: 2019-02-28 | End: 2020-05-12

## 2019-02-28 ASSESSMENT — ENCOUNTER SYMPTOMS
CHILLS: 0
NAUSEA: 0
HEADACHES: 0
BREAST MASS: 0
WEAKNESS: 0
ARTHRALGIAS: 0
DYSURIA: 0
HEMATURIA: 0
COUGH: 0
NERVOUS/ANXIOUS: 1
FREQUENCY: 0
CONSTIPATION: 1
EYE PAIN: 0
HEMATOCHEZIA: 0
SORE THROAT: 0
PARESTHESIAS: 0
FEVER: 0
DIZZINESS: 0
MYALGIAS: 0
HEARTBURN: 0
JOINT SWELLING: 0
PALPITATIONS: 0
DIARRHEA: 0
SHORTNESS OF BREATH: 0
ABDOMINAL PAIN: 0

## 2019-02-28 ASSESSMENT — MIFFLIN-ST. JEOR: SCORE: 1246.07

## 2019-02-28 NOTE — NURSING NOTE
"Chief Complaint   Patient presents with     Physical       Initial /60 (BP Location: Right arm)   Pulse 78   Temp 98.8  F (37.1  C) (Tympanic)   Resp 16   Ht 1.575 m (5' 2\")   Wt 60.8 kg (134 lb)   LMP 05/01/2017   SpO2 99%   BMI 24.51 kg/m   Estimated body mass index is 24.51 kg/m  as calculated from the following:    Height as of this encounter: 1.575 m (5' 2\").    Weight as of this encounter: 60.8 kg (134 lb).    Patient presents to the clinic using No DME    Health Maintenance that is potentially due pending provider review:  NONE    n/a    Is there anyone who you would like to be able to receive your results? No  If yes have patient fill out KATTY    "

## 2019-02-28 NOTE — PROGRESS NOTES
SUBJECTIVE:   CC: Joel Arthur is an 38 year old woman who presents for preventive health visit.     Physical   Annual:     Getting at least 3 servings of Calcium per day:  Yes    Bi-annual eye exam:  Yes    Dental care twice a year:  Yes    Sleep apnea or symptoms of sleep apnea:  None    Diet:  Carbohydrate counting    Frequency of exercise:  2-3 days/week    Duration of exercise:  15-30 minutes    Taking medications regularly:  Not Applicable    Additional concerns today:  No    PHQ-2 Total Score: 0      Has a history of cervical cancer in situ, had a davinci hysterectomy 2017. We are doing yearly vaginal paps.     Today's PHQ-2 Score:   PHQ-2 ( 1999 [employer]) 2/28/2019   Q1: Little interest or pleasure in doing things 0   Q2: Feeling down, depressed or hopeless 0   PHQ-2 Score 0   Q1: Little interest or pleasure in doing things Not at all   Q2: Feeling down, depressed or hopeless Not at all   PHQ-2 Score 0       Abuse: Current or Past(Physical, Sexual or Emotional)- No  Do you feel safe in your environment? Yes    Social History     Tobacco Use     Smoking status: Former Smoker     Types: Cigarettes     Last attempt to quit: 1/1/2016     Years since quitting: 3.1     Smokeless tobacco: Never Used     Tobacco comment: quit 1/1/10   Substance Use Topics     Alcohol use: Yes     Alcohol/week: 0.0 oz     Comment: occas - 5-6 beers 2 times weekly     Alcohol Use 2/28/2019   If you drink alcohol do you typically have greater than 3 drinks per day OR greater than 7 drinks per week? No       Reviewed orders with patient.  Reviewed health maintenance and updated orders accordingly - Yes  BP Readings from Last 3 Encounters:   02/28/19 102/60   02/19/18 102/64   06/30/17 100/62    Wt Readings from Last 3 Encounters:   02/28/19 60.8 kg (134 lb)   02/19/18 64.9 kg (143 lb)   06/30/17 61.7 kg (136 lb)                Pertinent mammograms are reviewed under the imaging tab.  History of abnormal Pap smear:   Last 3 Pap  Results:   PAP (no units)   Date Value   2019 NIL   2018 NIL   2017 ATYP (A)     PAP / HPV Latest Ref Rng & Units 2019   PAP - NIL NIL ATYP(A)   HPV 16 DNA NEG:Negative Negative Negative Negative   HPV 18 DNA NEG:Negative Negative Negative Positive(A)   OTHER HR HPV NEG:Negative Negative Negative Negative     Reviewed and updated as needed this visit by clinical staff  Tobacco  Allergies  Meds  Problems  Med Hx  Surg Hx  Fam Hx  Soc Hx          Reviewed and updated as needed this visit by Provider  Tobacco  Allergies  Meds  Problems  Med Hx  Surg Hx  Fam Hx        Past Medical History:   Diagnosis Date     Atypical glandular cells of undetermined significance (GERONIMO) on cervical Pap smear 2017    + HR HPV 18     Cervical high risk HPV (human papillomavirus) test positive /    type 18     Chickenpox      H/O colposcopy with cervical biopsy 2017    Bx & ECC - cervical adenocarcinoma      Past Surgical History:   Procedure Laterality Date     BIOPSY VULVA N/A 2017    Procedure: BIOPSY VULVA;  Surgeon: Martha Talbert MD;  Location: [facility]     COLPOSCOPY, CONIZATION, COMBINED N/A 2017    Procedure: COMBINED COLPOSCOPY, CONIZATION;  Surgeon: Martha Talbert MD;  [facility]     CYSTOSCOPY N/A 2017    Procedure: CYSTOSCOPY;;  Surgeon: Martha Talbert MD;  Location: [facility]     DAVINCI HYSTERECTOMY TOTAL, SALPINGECTOMY BILATERAL N/A 2017    Procedure: DAVINCI HYSTERECTOMY TOTAL, SALPINGECTOMY BILATERAL;  DaVinci Assisted Total Laparoscopic Hysterectomy, Bilateral Salpingectomy,  Cystoscopy, Anesthesia General;  Surgeon: Martha Talbert MD;  Location: [facility] OR     MOUTH SURGERY      wisdom teeth     REMOVE INTRAUTERINE DEVICE N/A 2017    Procedure: REMOVE INTRAUTERINE DEVICE;  Surgeon: Martha Talbert MD;  Location[facility]     Obstetric History       T2      L2     SAB0   TAB0   Ectopic0    "Multiple0   Live Births2       # Outcome Date GA Lbr Real/2nd Weight Sex Delivery Anes PTL Lv   2 Term 12/30/13 39w3d 03:14 / 00:09 3.515 kg (7 lb 12 oz) F Vag-Spont EPI  JAMES      Name: ROLAND ARTHUR      Apgar1:  9                Apgar5: 9   1 Term 09/30/10 39w0d 18:00 3.572 kg (7 lb 14 oz) M IVD EPI N JAMES      Name: Quentin Arthur      Apgar1:  6                Apgar5: 7          Review of Systems   Constitutional: Negative for chills and fever.   HENT: Negative for congestion, ear pain, hearing loss and sore throat.    Eyes: Negative for pain and visual disturbance.   Respiratory: Negative for cough and shortness of breath.    Cardiovascular: Negative for chest pain, palpitations and peripheral edema.   Gastrointestinal: Positive for constipation. Negative for abdominal pain, diarrhea, heartburn, hematochezia and nausea.   Breasts:  Negative for tenderness, breast mass and discharge.   Genitourinary: Negative for dysuria, frequency, genital sores, hematuria, pelvic pain, urgency, vaginal bleeding and vaginal discharge.   Musculoskeletal: Negative for arthralgias, joint swelling and myalgias.   Skin: Negative for rash.   Neurological: Negative for dizziness, weakness, headaches and paresthesias.   Psychiatric/Behavioral: Negative for mood changes. The patient is nervous/anxious.         OBJECTIVE:   /60 (BP Location: Right arm)   Pulse 78   Temp 98.8  F (37.1  C) (Tympanic)   Resp 16   Ht 1.575 m (5' 2\")   Wt 60.8 kg (134 lb)   LMP 05/01/2017   SpO2 99%   BMI 24.51 kg/m    Physical Exam  GENERAL: healthy, alert and no distress  EYES: Eyes grossly normal to inspection, PERRL and conjunctivae and sclerae normal  HENT: ear canals and TM's normal, nose and mouth without ulcers or lesions  NECK: no adenopathy, no asymmetry, masses, or scars and thyroid normal to palpation  RESP: lungs clear to auscultation - no rales, rhonchi or wheezes  BREAST: normal without masses, tenderness or nipple discharge " "and no palpable axillary masses or adenopathy  CV: regular rate and rhythm, normal S1 S2, no S3 or S4, no murmur, click or rub, no peripheral edema and peripheral pulses strong  ABDOMEN: soft, nontender, no hepatosplenomegaly, no masses and bowel sounds normal   (female): normal female external genitalia, normal urethral meatus, vaginal mucosa pink, moist, well rugated, vaginal pap taken, adnexa without mass or tenderness, uterus surgicaly absent  MS: no gross musculoskeletal defects noted, no edema  SKIN: erythematous rash across lower breast, some mild erythema dry skin on top of forehead  NEURO: Normal strength and tone, mentation intact and speech normal  PSYCH: mentation appears normal, affect normal/bright        ASSESSMENT/PLAN:   Joel was seen today for physical.    Diagnoses and all orders for this visit:    Routine general medical examination at a health care facility    Skin rash  -     triamcinolone (KENALOG) 0.1 % external cream; Apply sparingly to affected area on face one to two times daily for 14 days.    Adenocarcinoma in situ (AIS) of uterine cervix  -     Pap imaged thin layer diagnostic with HPV (select HPV order below)  -     HPV High Risk Types DNA Cervical        COUNSELING:  Reviewed preventive health counseling, as reflected in patient instructions    BP Readings from Last 1 Encounters:   02/28/19 102/60     Estimated body mass index is 24.51 kg/m  as calculated from the following:    Height as of this encounter: 1.575 m (5' 2\").    Weight as of this encounter: 60.8 kg (134 lb).       reports that she quit smoking about 3 years ago. Her smoking use included cigarettes. she has never used smokeless tobacco.      Counseling Resources:  ATP IV Guidelines  Pooled Cohorts Equation Calculator  Breast Cancer Risk Calculator  FRAX Risk Assessment  ICSI Preventive Guidelines  Dietary Guidelines for Americans, 2010  USDA's MyPlate  ASA Prophylaxis  Lung CA Screening    Tish Gibson, " MD  [facility]

## 2019-03-04 LAB
COPATH REPORT: NORMAL
PAP: NORMAL

## 2019-03-11 PROBLEM — C53.9 ADENOCARCINOMA OF CERVIX (H): Status: RESOLVED | Noted: 2019-02-28 | Resolved: 2019-03-11

## 2019-09-24 DIAGNOSIS — D06.9 ADENOCARCINOMA IN SITU (AIS) OF UTERINE CERVIX: ICD-10-CM

## 2019-09-24 DIAGNOSIS — L90.0 LICHEN SCLEROSUS ET ATROPHICUS: ICD-10-CM

## 2019-09-27 RX ORDER — CLOBETASOL PROPIONATE 0.5 MG/G
CREAM TOPICAL
Refills: 0 | OUTPATIENT
Start: 2019-09-27

## 2019-10-24 DIAGNOSIS — D06.9 ADENOCARCINOMA IN SITU (AIS) OF UTERINE CERVIX: ICD-10-CM

## 2019-10-24 DIAGNOSIS — L90.0 LICHEN SCLEROSUS ET ATROPHICUS: ICD-10-CM

## 2019-10-25 RX ORDER — CLOBETASOL PROPIONATE 0.5 MG/G
CREAM TOPICAL
Refills: 0 | OUTPATIENT
Start: 2019-10-25

## 2019-10-29 ENCOUNTER — TELEPHONE (OUTPATIENT)
Dept: FAMILY MEDICINE | Facility: CLINIC | Age: 39
End: 2019-10-29

## 2019-10-29 NOTE — TELEPHONE ENCOUNTER
This medication is not active on her medication list and we have never prescribed it. Note sent to pharmacy, refill denied

## 2019-10-29 NOTE — TELEPHONE ENCOUNTER
Request received for Clobetasol 0.05% Cream. Apply sparingly to affected area's twice daily as needed. ~ Do not apply to face~    This request had Martha Talbert Prescriber from [facility] - This was sent Attn: Dr. Veloz.    Not listed on current med list - Please Advise  [place] [facility]

## 2020-03-01 ENCOUNTER — HEALTH MAINTENANCE LETTER (OUTPATIENT)
Age: 40
End: 2020-03-01

## 2020-03-17 ENCOUNTER — TELEPHONE (OUTPATIENT)
Dept: FAMILY MEDICINE | Facility: CLINIC | Age: 40
End: 2020-03-17

## 2020-03-17 NOTE — TELEPHONE ENCOUNTER
Voice message was left at 5:05 3/16/20    Reason for Call:  Other     Detailed comments: Patient says she got a call that her physical with Dr Gibson scheduled for 4/7/20 has to be re-scheduled. She says she was worried this may happen and says she had a cancer scare in the past and is concerned about this.     Phone Number Patient can be reached at: Home number on file [phone] (home)    Best Time: did not say    Can we leave a detailed message on this number? Not Applicable    Call taken on 3/17/2020 at 7:57 AM by Jaz De La Torre

## 2020-05-04 ASSESSMENT — ENCOUNTER SYMPTOMS
HEMATURIA: 0
NERVOUS/ANXIOUS: 0
COUGH: 0
SORE THROAT: 0
MYALGIAS: 0
JOINT SWELLING: 0
DIARRHEA: 0
ABDOMINAL PAIN: 0
BREAST MASS: 0
NAUSEA: 0
EYE PAIN: 0
HEADACHES: 0
PALPITATIONS: 0
FEVER: 0
DYSURIA: 0
CHILLS: 0
WEAKNESS: 0
DIZZINESS: 0
ARTHRALGIAS: 0
FREQUENCY: 0
HEMATOCHEZIA: 0
PARESTHESIAS: 0
HEARTBURN: 0
CONSTIPATION: 0
SHORTNESS OF BREATH: 0

## 2020-05-12 ENCOUNTER — OFFICE VISIT (OUTPATIENT)
Dept: FAMILY MEDICINE | Facility: CLINIC | Age: 40
End: 2020-05-12
Payer: COMMERCIAL

## 2020-05-12 VITALS
HEART RATE: 62 BPM | TEMPERATURE: 97.7 F | HEIGHT: 63 IN | OXYGEN SATURATION: 99 % | BODY MASS INDEX: 26.36 KG/M2 | SYSTOLIC BLOOD PRESSURE: 104 MMHG | WEIGHT: 148.8 LBS | RESPIRATION RATE: 16 BRPM | DIASTOLIC BLOOD PRESSURE: 64 MMHG

## 2020-05-12 DIAGNOSIS — D06.9 ADENOCARCINOMA IN SITU (AIS) OF UTERINE CERVIX: ICD-10-CM

## 2020-05-12 DIAGNOSIS — F41.1 GAD (GENERALIZED ANXIETY DISORDER): ICD-10-CM

## 2020-05-12 DIAGNOSIS — Z00.00 ROUTINE GENERAL MEDICAL EXAMINATION AT A HEALTH CARE FACILITY: Primary | ICD-10-CM

## 2020-05-12 PROCEDURE — 99395 PREV VISIT EST AGE 18-39: CPT | Performed by: FAMILY MEDICINE

## 2020-05-12 PROCEDURE — 87624 HPV HI-RISK TYP POOLED RSLT: CPT | Performed by: FAMILY MEDICINE

## 2020-05-12 PROCEDURE — 88175 CYTOPATH C/V AUTO FLUID REDO: CPT | Performed by: FAMILY MEDICINE

## 2020-05-12 RX ORDER — PROPRANOLOL HYDROCHLORIDE 10 MG/1
10 TABLET ORAL 3 TIMES DAILY PRN
Qty: 30 TABLET | Refills: 3 | Status: SHIPPED | OUTPATIENT
Start: 2020-05-12 | End: 2021-04-06

## 2020-05-12 ASSESSMENT — MIFFLIN-ST. JEOR: SCORE: 1317.69

## 2020-05-12 NOTE — PROGRESS NOTES
SUBJECTIVE:   CC: Joel Arthur is an 39 year old woman who presents for preventive health visit.     Healthy Habits:    Do you get at least three servings of calcium containing foods daily (dairy, green leafy vegetables, etc.)? yes    Amount of exercise or daily activities, outside of work: 4 day(s) per week    Problems taking medications regularly not applicable    Medication side effects: No    Have you had an eye exam in the past two years? yes    Do you see a dentist twice per year? no    Do you have sleep apnea, excessive snoring or daytime drowsiness?no      Today's PHQ-2 Score:   PHQ-2 (  [employer]) 3/14/2020 3/14/2020   Q1: Little interest or pleasure in doing things 0 0   Q2: Feeling down, depressed or hopeless 0 0   PHQ-2 Score 0 0   Q1: Little interest or pleasure in doing things Not at all Not at all   Q2: Feeling down, depressed or hopeless Not at all Not at all   PHQ-2 Score 0 0       Abuse: Current or Past(Physical, Sexual or Emotional)- No  Do you feel safe in your environment? Yes        Social History     Tobacco Use     Smoking status: Former Smoker     Types: Cigarettes     Last attempt to quit: 2016     Years since quittin.3     Smokeless tobacco: Never Used     Tobacco comment: quit 1/1/10   Substance Use Topics     Alcohol use: Yes     Alcohol/week: 0.0 standard drinks     Comment: occas - 5-6 beers 2 times weekly     If you drink alcohol do you typically have >3 drinks per day or >7 drinks per week? No                     Reviewed orders with patient.  Reviewed health maintenance and updated orders accordingly - Yes  BP Readings from Last 3 Encounters:   20 104/64   19 102/60   18 102/64    Wt Readings from Last 3 Encounters:   20 67.5 kg (148 lb 12.8 oz)   19 60.8 kg (134 lb)   18 64.9 kg (143 lb)                    Mammogram not appropriate for this patient based on age.    Pertinent mammograms are reviewed under the imaging tab.  History of  abnormal Pap smear: had a history of adenocarinoma in situ, had a hysterectomy   Last 3 Pap and HPV Results:     PAP / HPV Latest Ref Rng & Units 2019   PAP - NIL NIL ATYP(A)   HPV 16 DNA NEG:Negative Negative Negative Negative   HPV 18 DNA NEG:Negative Negative Negative Positive(A)   OTHER HR HPV NEG:Negative Negative Negative Negative     Reviewed and updated as needed this visit by clinical staff  Tobacco  Allergies  Med Hx  Surg Hx  Fam Hx  Soc Hx        Reviewed and updated as needed this visit by Provider        Past Medical History:   Diagnosis Date     Atypical glandular cells of undetermined significance (GERONIMO) on cervical Pap smear 2017    + HR HPV 18     Cervical high risk HPV (human papillomavirus) test positive 16    type 18     Chickenpox      H/O colposcopy with cervical biopsy 2017    Bx & ECC - cervical adenocarcinoma      Past Surgical History:   Procedure Laterality Date     BIOPSY VULVA N/A 2017    Procedure: BIOPSY VULVA;  Surgeon: Martha Talbert MD;  [facility]     COLPOSCOPY, CONIZATION, COMBINED N/A 2017    Procedure: COMBINED COLPOSCOPY, CONIZATION;  Surgeon: Martha Talbert MD;  [facility]     CYSTOSCOPY N/A 2017    Procedure: CYSTOSCOPY;;  Surgeon: Martha Talbert MD;  [facility]     DAVINCI HYSTERECTOMY TOTAL, SALPINGECTOMY BILATERAL N/A 2017    Procedure: DAVINCI HYSTERECTOMY TOTAL, SALPINGECTOMY BILATERAL;  DaVinci Assisted Total Laparoscopic Hysterectomy, Bilateral Salpingectomy,  Cystoscopy, Anesthesia General;  Surgeon: Martha Talbert MD;  Location: [facility]     MOUTH SURGERY      wisdom teeth     REMOVE INTRAUTERINE DEVICE N/A 2017    Procedure: REMOVE INTRAUTERINE DEVICE;  Surgeon: Martha Talbert MD;  [facility]     OB History    Para Term  AB Living   2 2 2 0 0 2   SAB TAB Ectopic Multiple Live Births   0 0 0 0 2      # Outcome Date GA Lbr Real/2nd  Weight Sex Delivery Anes PTL Lv   2 Term 12/30/13 39w3d 03:14 / 00:09 3.515 kg (7 lb 12 oz) F Vag-Spont EPI  JAMES      Name: ROLAND ARTHUR      Apgar1: 9  Apgar5: 9   1 Term 09/30/10 39w0d 18:00 3.572 kg (7 lb 14 oz) M IVD EPI N JAMES      Name: Quentin Arthur      Apgar1: 6  Apgar5: 7       ROS:  CONSTITUTIONAL: NEGATIVE for fever, chills, change in weight  INTEGUMENTARU/SKIN: NEGATIVE for worrisome rashes, moles or lesions  EYES: NEGATIVE for vision changes or irritation  ENT: NEGATIVE for ear, mouth and throat problems  RESP: NEGATIVE for significant cough or SOB  BREAST: NEGATIVE for masses, tenderness or discharge  CV: NEGATIVE for chest pain, palpitations or peripheral edema  GI: NEGATIVE for nausea, abdominal pain, heartburn, or change in bowel habits  : NEGATIVE for unusual urinary or vaginal symptoms. Periods are regular.  MUSCULOSKELETAL: NEGATIVE for significant arthralgias or myalgia  NEURO: NEGATIVE for weakness, dizziness or paresthesias  PSYCHIATRIC: NEGATIVE for changes in mood or affect    OBJECTIVE:   LMP 05/01/2017   EXAM:  GENERAL: healthy, alert and no distress  EYES: Eyes grossly normal to inspection, PERRL and conjunctivae and sclerae normal  HENT: ear canals and TM's normal, nose and mouth without ulcers or lesions  NECK: no adenopathy, no asymmetry, masses, or scars and thyroid normal to palpation  RESP: lungs clear to auscultation - no rales, rhonchi or wheezes  CV: regular rate and rhythm, normal S1 S2, no S3 or S4, no murmur, click or rub, no peripheral edema and peripheral pulses strong  ABDOMEN: soft, nontender, no hepatosplenomegaly, no masses and bowel sounds normal   (female): normal female external genitalia, normal urethral meatus, vaginal mucosa pink, moist, well rugated, and adnexa without mass or tenderness, uterus surgically absent, pap taken  MS: no gross musculoskeletal defects noted, no edema  SKIN: no suspicious lesions or rashes  NEURO: Normal strength and tone,  "mentation intact and speech normal  PSYCH: mentation appears normal, affect normal/bright    Diagnostic Test Results:  Labs reviewed in Epic    ASSESSMENT/PLAN:   Joel was seen today for physical.    Diagnoses and all orders for this visit:    Routine general medical examination at a health care facility    SORAYA (generalized anxiety disorder)  -     propranolol (INDERAL) 10 MG tablet; Take 1 tablet (10 mg) by mouth 3 times daily as needed (anxiety)    Adenocarcinoma in situ (AIS) of uterine cervix  -     Pap imaged thin layer diagnostic with HPV (select HPV order below)  -     HPV High Risk Types DNA Cervical        COUNSELING:   Reviewed preventive health counseling, as reflected in patient instructions    Estimated body mass index is 24.51 kg/m  as calculated from the following:    Height as of 2/28/19: 1.575 m (5' 2\").    Weight as of 2/28/19: 60.8 kg (134 lb).         reports that she quit smoking about 4 years ago. Her smoking use included cigarettes. She has never used smokeless tobacco.      Counseling Resources:  ATP IV Guidelines  Pooled Cohorts Equation Calculator  Breast Cancer Risk Calculator  FRAX Risk Assessment  ICSI Preventive Guidelines  Dietary Guidelines for Americans, 2010  [employer]'s MyPlate  ASA Prophylaxis  Lung CA Screening    Tish Gibson MD  [facility]  Answers for HPI/ROS submitted by the patient on 5/4/2020   Annual Exam:  Frequency of exercise:: 4-5 days/week  Getting at least 3 servings of Calcium per day:: Yes  Diet:: Carbohydrate counting  Taking medications regularly:: Yes  Medication side effects:: None  Bi-annual eye exam:: Yes  Dental care twice a year:: NO  Sleep apnea or symptoms of sleep apnea:: None  abdominal pain: No  Blood in stool: No  Blood in urine: No  chest pain: No  chills: No  congestion: No  constipation: No  cough: No  diarrhea: No  dizziness: No  ear pain: No  eye pain: No  nervous/anxious: No  fever: No  frequency: No  genital sores: " No  headaches: No  hearing loss: No  heartburn: No  arthralgias: No  joint swelling: No  peripheral edema: No  mood changes: No  myalgias: No  nausea: No  dysuria: No  palpitations: No  Skin sensation changes: No  sore throat: No  urgency: No  rash: No  shortness of breath: No  visual disturbance: No  weakness: No  pelvic pain: No  vaginal bleeding: No  vaginal discharge: No  tenderness: No  breast mass: No  breast discharge: No  Additional concerns today:: No  Duration of exercise:: 15-30 minutes

## 2020-05-14 LAB
COPATH REPORT: NORMAL
PAP: NORMAL

## 2020-12-13 ENCOUNTER — HEALTH MAINTENANCE LETTER (OUTPATIENT)
Age: 40
End: 2020-12-13

## 2021-04-06 ENCOUNTER — OFFICE VISIT (OUTPATIENT)
Dept: FAMILY MEDICINE | Facility: CLINIC | Age: 41
End: 2021-04-06
Payer: COMMERCIAL

## 2021-04-06 VITALS
DIASTOLIC BLOOD PRESSURE: 66 MMHG | BODY MASS INDEX: 26.75 KG/M2 | HEIGHT: 63 IN | SYSTOLIC BLOOD PRESSURE: 106 MMHG | RESPIRATION RATE: 16 BRPM | WEIGHT: 151 LBS | HEART RATE: 72 BPM

## 2021-04-06 DIAGNOSIS — F41.1 GAD (GENERALIZED ANXIETY DISORDER): ICD-10-CM

## 2021-04-06 DIAGNOSIS — D06.9 ADENOCARCINOMA IN SITU (AIS) OF UTERINE CERVIX: ICD-10-CM

## 2021-04-06 DIAGNOSIS — Z00.00 ROUTINE GENERAL MEDICAL EXAMINATION AT A HEALTH CARE FACILITY: Primary | ICD-10-CM

## 2021-04-06 PROCEDURE — 99395 PREV VISIT EST AGE 18-39: CPT | Performed by: FAMILY MEDICINE

## 2021-04-06 PROCEDURE — 87624 HPV HI-RISK TYP POOLED RSLT: CPT | Performed by: FAMILY MEDICINE

## 2021-04-06 RX ORDER — PROPRANOLOL HYDROCHLORIDE 10 MG/1
10 TABLET ORAL 3 TIMES DAILY PRN
Qty: 30 TABLET | Refills: 3 | Status: SHIPPED | OUTPATIENT
Start: 2021-04-06 | End: 2022-05-05

## 2021-04-06 RX ORDER — LATANOPROST 50 UG/ML
SOLUTION/ DROPS OPHTHALMIC
COMMUNITY
Start: 2021-04-04

## 2021-04-06 ASSESSMENT — ENCOUNTER SYMPTOMS
ARTHRALGIAS: 0
SORE THROAT: 0
DIARRHEA: 0
MYALGIAS: 0
CHILLS: 0
HEMATURIA: 0
NERVOUS/ANXIOUS: 1
FREQUENCY: 0
BREAST MASS: 0
DYSURIA: 0
COUGH: 0
SHORTNESS OF BREATH: 0
JOINT SWELLING: 0
EYE PAIN: 0
CONSTIPATION: 0
ABDOMINAL PAIN: 0
FEVER: 0
HEARTBURN: 0
DIZZINESS: 0
PARESTHESIAS: 0
WEAKNESS: 0
PALPITATIONS: 0
NAUSEA: 0
HEADACHES: 0
HEMATOCHEZIA: 0

## 2021-04-06 ASSESSMENT — MIFFLIN-ST. JEOR: SCORE: 1321.12

## 2021-04-06 NOTE — PROGRESS NOTES
SUBJECTIVE:   CC: Joel Arthur is an 40 year old woman who presents for preventive health visit.       Patient has been advised of split billing requirements and indicates understanding: Yes  Healthy Habits:     Getting at least 3 servings of Calcium per day:  NO    Bi-annual eye exam:  Yes    Dental care twice a year:  Yes    Sleep apnea or symptoms of sleep apnea:  None    Diet:  Carbohydrate counting    Frequency of exercise:  2-3 days/week    Duration of exercise:  15-30 minutes    Taking medications regularly:  Yes    Medication side effects:  None    PHQ-2 Total Score: 0    Additional concerns today:  No          Today's PHQ-2 Score:   PHQ-2 (  [employer]) 2021   Q1: Little interest or pleasure in doing things 0   Q2: Feeling down, depressed or hopeless 0   PHQ-2 Score 0   Q1: Little interest or pleasure in doing things Not at all   Q2: Feeling down, depressed or hopeless Not at all   PHQ-2 Score 0       Abuse: Current or Past (Physical, Sexual or Emotional) - No  Do you feel safe in your environment? Yes    Have you ever done Advance Care Planning? (For example, a Health Directive, POLST, or a discussion with a medical provider or your loved ones about your wishes): No, advance care planning information given to patient to review.  Patient declined advance care planning discussion at this time.    Social History     Tobacco Use     Smoking status: Former Smoker     Types: Cigarettes     Quit date: 2016     Years since quittin.2     Smokeless tobacco: Never Used     Tobacco comment: quit 1/1/10   Substance Use Topics     Alcohol use: Yes     Alcohol/week: 0.0 standard drinks     Comment: occas - 5-6 beers 2 times weekly     If you drink alcohol do you typically have >3 drinks per day or >7 drinks per week? No    Alcohol Use 2021   Prescreen: >3 drinks/day or >7 drinks/week? No   Prescreen: >3 drinks/day or >7 drinks/week? -   No flowsheet data found.    Reviewed orders with patient.   Reviewed health maintenance and updated orders accordingly - Yes  BP Readings from Last 3 Encounters:   04/06/21 106/66   05/12/20 104/64   02/28/19 102/60    Wt Readings from Last 3 Encounters:   04/06/21 68.5 kg (151 lb)   05/12/20 67.5 kg (148 lb 12.8 oz)   02/28/19 60.8 kg (134 lb)                    Breast Cancer Screening:    Breast CA Risk Assessment (FHS-7) 4/6/2021   Do you have a family history of breast, colon, or ovarian cancer? No / Unknown         Patient under 40 years of age: Routine Mammogram Screening not recommended.   Pertinent mammograms are reviewed under the imaging tab.    History of abnormal Pap smear: YES - updated in Problem List and Health Maintenance accordingly  PAP / HPV Latest Ref Rng & Units 5/12/2020 2/28/2019 2/19/2018   PAP - NIL NIL NIL   HPV 16 DNA NEG:Negative Negative Negative Negative   HPV 18 DNA NEG:Negative Negative Negative Negative   OTHER HR HPV NEG:Negative Negative Negative Negative     Reviewed and updated as needed this visit by clinical staff  Tobacco  Allergies  Meds  Problems  Med Hx  Surg Hx  Fam Hx          Reviewed and updated as needed this visit by Provider  Tobacco  Allergies  Meds  Problems  Med Hx  Surg Hx  Fam Hx         Past Medical History:   Diagnosis Date     Atypical glandular cells of undetermined significance (GERONIMO) on cervical Pap smear 02/17/2017    + HR HPV 18     Cervical high risk HPV (human papillomavirus) test positive 1/18/16, 2017    see problem list     Chickenpox      H/O colposcopy with cervical biopsy 03/20/2017    Bx & ECC - cervical adenocarcinoma      Past Surgical History:   Procedure Laterality Date     BIOPSY VULVA N/A 4/13/2017    Procedure: BIOPSY VULVA;  Surgeon: Martha Talbert MD;  Location: [facility]     COLPOSCOPY, CONIZATION, COMBINED N/A 4/13/2017    Procedure: COMBINED COLPOSCOPY, CONIZATION;  Surgeon: Martha Talbert MD;  Location:  OR     CYSTOSCOPY N/A 5/24/2017    Procedure: CYSTOSCOPY;;   Surgeon: Martha Talbert MD;  Location: [facility] OR     DAVINCI HYSTERECTOMY TOTAL, SALPINGECTOMY BILATERAL N/A 2017    Procedure: DAVINCI HYSTERECTOMY TOTAL, SALPINGECTOMY BILATERAL;  DaVinci Assisted Total Laparoscopic Hysterectomy, Bilateral Salpingectomy,  Cystoscopy, Anesthesia General;  Surgeon: Martha Talbert MD;  Location: [facility] OR     MOUTH SURGERY      wisdom teeth     REMOVE INTRAUTERINE DEVICE N/A 2017    Procedure: REMOVE INTRAUTERINE DEVICE;  Surgeon: Martha Talbert MD;  [facility]     OB History    Para Term  AB Living   2 2 2 0 0 2   SAB TAB Ectopic Multiple Live Births   0 0 0 0 2      # Outcome Date GA Lbr Real/2nd Weight Sex Delivery Anes PTL Lv   2 Term 13 39w3d 03:14 / 00:09 3.515 kg (7 lb 12 oz) F Vag-Spont EPI  JAMES      Name: RASHEL,BABY1 RYAN MALDONADO      Apgar1: 9  Apgar5: 9   1 Term 09/30/10 39w0d 18:00 3.572 kg (7 lb 14 oz) M IVD EPI N JAMES      Name: Quentin Arthur      Apgar1: 6  Apgar5: 7       Review of Systems   Constitutional: Negative for chills and fever.   HENT: Negative for congestion, ear pain, hearing loss and sore throat.    Eyes: Negative for pain and visual disturbance.   Respiratory: Negative for cough and shortness of breath.    Cardiovascular: Negative for chest pain, palpitations and peripheral edema.   Gastrointestinal: Negative for abdominal pain, constipation, diarrhea, heartburn, hematochezia and nausea.   Breasts:  Negative for tenderness, breast mass and discharge.   Genitourinary: Negative for dysuria, frequency, genital sores, hematuria, pelvic pain, urgency, vaginal bleeding and vaginal discharge.   Musculoskeletal: Negative for arthralgias, joint swelling and myalgias.   Skin: Negative for rash.   Neurological: Negative for dizziness, weakness, headaches and paresthesias.   Psychiatric/Behavioral: Negative for mood changes. The patient is nervous/anxious.         OBJECTIVE:   /66   Pulse 72   Resp 16   Ht 1.588 m (5'  "2.5\")   Wt 68.5 kg (151 lb)   LMP 05/01/2017   BMI 27.18 kg/m    Physical Exam  GENERAL: healthy, alert and no distress  EYES: Eyes grossly normal to inspection, PERRL and conjunctivae and sclerae normal  NECK: no adenopathy, no asymmetry, masses, or scars and thyroid normal to palpation  RESP: lungs clear to auscultation - no rales, rhonchi or wheezes  BREAST: normal without masses, tenderness or nipple discharge and no palpable axillary masses or adenopathy  CV: regular rate and rhythm, normal S1 S2, no S3 or S4, no murmur, click or rub, no peripheral edema and peripheral pulses strong  ABDOMEN: soft, nontender, no hepatosplenomegaly, no masses and bowel sounds normal   (female): normal female external genitalia, normal urethral meatus, vaginal mucosa pink, moist, well rugated, and normal cervix/adnexa/uterus without masses or discharge  MS: no gross musculoskeletal defects noted, no edema  SKIN: no suspicious lesions or rashes  NEURO: Normal strength and tone, mentation intact and speech normal  PSYCH: mentation appears normal, affect normal/bright        ASSESSMENT/PLAN:   Joel was seen today for physical.    Diagnoses and all orders for this visit:    Routine general medical examination at a health care facility    SORAYA (generalized anxiety disorder)  -     propranolol (INDERAL) 10 MG tablet; Take 1 tablet (10 mg) by mouth 3 times daily as needed (anxiety)    Adenocarcinoma in situ (AIS) of uterine cervix  -     Pap imaged thin layer diagnostic with HPV (select HPV order below)        Patient has been advised of split billing requirements and indicates understanding: No  COUNSELING:  Reviewed preventive health counseling, as reflected in patient instructions    Estimated body mass index is 27.18 kg/m  as calculated from the following:    Height as of this encounter: 1.588 m (5' 2.5\").    Weight as of this encounter: 68.5 kg (151 lb).    Weight management plan: Discussed healthy diet and exercise " guidelines    She reports that she quit smoking about 5 years ago. Her smoking use included cigarettes. She has never used smokeless tobacco.      Counseling Resources:  ATP IV Guidelines  Pooled Cohorts Equation Calculator  Breast Cancer Risk Calculator  BRCA-Related Cancer Risk Assessment: FHS-7 Tool  FRAX Risk Assessment  ICSI Preventive Guidelines  Dietary Guidelines for Americans, 2010  USDA's MyPlate  ASA Prophylaxis  Lung CA Screening    Tish Veloz MD  [facility]

## 2021-04-08 LAB
COPATH REPORT: NORMAL
PAP: NORMAL

## 2021-04-12 ENCOUNTER — PATIENT OUTREACH (OUTPATIENT)
Dept: FAMILY MEDICINE | Facility: CLINIC | Age: 41
End: 2021-04-12

## 2021-10-02 ENCOUNTER — HEALTH MAINTENANCE LETTER (OUTPATIENT)
Age: 41
End: 2021-10-02

## 2022-03-17 ENCOUNTER — PATIENT OUTREACH (OUTPATIENT)
Dept: FAMILY MEDICINE | Facility: CLINIC | Age: 42
End: 2022-03-17
Payer: COMMERCIAL

## 2022-03-17 NOTE — LETTER
March 17, 2022      Joel Arthur  [address] [facility] [id number]        Dear ,    This letter is to remind you that you are due for your follow-up Pap smear and Human Papillomavirus (HPV) test.    Please call [phone] to schedule your appointment at your earliest convenience.    If you have completed the appointment outside of the [facility] system, please have the records forwarded to our office. We will update your chart for your provider to review before your next annual wellness visit.     Thank you for choosing [facility]!      Sincerely,    Your [facility] Care Team

## 2022-05-05 ENCOUNTER — OFFICE VISIT (OUTPATIENT)
Dept: FAMILY MEDICINE | Facility: CLINIC | Age: 42
End: 2022-05-05
Payer: COMMERCIAL

## 2022-05-05 VITALS
DIASTOLIC BLOOD PRESSURE: 68 MMHG | RESPIRATION RATE: 20 BRPM | WEIGHT: 152 LBS | HEART RATE: 78 BPM | HEIGHT: 62 IN | SYSTOLIC BLOOD PRESSURE: 102 MMHG | BODY MASS INDEX: 27.97 KG/M2 | OXYGEN SATURATION: 100 % | TEMPERATURE: 97.5 F

## 2022-05-05 DIAGNOSIS — Z12.4 CERVICAL CANCER SCREENING: ICD-10-CM

## 2022-05-05 DIAGNOSIS — Z13.6 CARDIOVASCULAR SCREENING; LDL GOAL LESS THAN 160: ICD-10-CM

## 2022-05-05 DIAGNOSIS — F41.1 GAD (GENERALIZED ANXIETY DISORDER): ICD-10-CM

## 2022-05-05 DIAGNOSIS — Z12.31 ENCOUNTER FOR SCREENING MAMMOGRAM FOR BREAST CANCER: ICD-10-CM

## 2022-05-05 DIAGNOSIS — N39.3 FEMALE STRESS INCONTINENCE: ICD-10-CM

## 2022-05-05 DIAGNOSIS — D06.9 ADENOCARCINOMA IN SITU (AIS) OF UTERINE CERVIX: ICD-10-CM

## 2022-05-05 DIAGNOSIS — Z00.00 ROUTINE GENERAL MEDICAL EXAMINATION AT A HEALTH CARE FACILITY: Primary | ICD-10-CM

## 2022-05-05 DIAGNOSIS — Z13.1 SCREENING FOR DIABETES MELLITUS: ICD-10-CM

## 2022-05-05 LAB
CHOLEST SERPL-MCNC: 149 MG/DL
ERYTHROCYTE [DISTWIDTH] IN BLOOD BY AUTOMATED COUNT: 12.3 % (ref 10–15)
FASTING STATUS PATIENT QL REPORTED: YES
FASTING STATUS PATIENT QL REPORTED: YES
GLUCOSE BLD-MCNC: 83 MG/DL (ref 70–99)
HCT VFR BLD AUTO: 39.7 % (ref 35–47)
HDLC SERPL-MCNC: 91 MG/DL
HGB BLD-MCNC: 13 G/DL (ref 11.7–15.7)
LDLC SERPL CALC-MCNC: 51 MG/DL
MCH RBC QN AUTO: 31.3 PG (ref 26.5–33)
MCHC RBC AUTO-ENTMCNC: 32.7 G/DL (ref 31.5–36.5)
MCV RBC AUTO: 95 FL (ref 78–100)
NONHDLC SERPL-MCNC: 58 MG/DL
PLATELET # BLD AUTO: 187 10E3/UL (ref 150–450)
RBC # BLD AUTO: 4.16 10E6/UL (ref 3.8–5.2)
TRIGL SERPL-MCNC: 34 MG/DL
WBC # BLD AUTO: 6.1 10E3/UL (ref 4–11)

## 2022-05-05 PROCEDURE — 99396 PREV VISIT EST AGE 40-64: CPT | Performed by: FAMILY MEDICINE

## 2022-05-05 PROCEDURE — 99213 OFFICE O/P EST LOW 20 MIN: CPT | Mod: 25 | Performed by: FAMILY MEDICINE

## 2022-05-05 PROCEDURE — 87624 HPV HI-RISK TYP POOLED RSLT: CPT | Performed by: FAMILY MEDICINE

## 2022-05-05 PROCEDURE — 85027 COMPLETE CBC AUTOMATED: CPT | Performed by: FAMILY MEDICINE

## 2022-05-05 PROCEDURE — G0145 SCR C/V CYTO,THINLAYER,RESCR: HCPCS | Performed by: FAMILY MEDICINE

## 2022-05-05 PROCEDURE — 36415 COLL VENOUS BLD VENIPUNCTURE: CPT | Performed by: FAMILY MEDICINE

## 2022-05-05 PROCEDURE — 82947 ASSAY GLUCOSE BLOOD QUANT: CPT | Performed by: FAMILY MEDICINE

## 2022-05-05 PROCEDURE — 80061 LIPID PANEL: CPT | Performed by: FAMILY MEDICINE

## 2022-05-05 RX ORDER — PROPRANOLOL HYDROCHLORIDE 10 MG/1
10 TABLET ORAL 3 TIMES DAILY PRN
Qty: 30 TABLET | Refills: 3 | Status: SHIPPED | OUTPATIENT
Start: 2022-05-05 | End: 2023-03-02

## 2022-05-05 ASSESSMENT — PAIN SCALES - GENERAL: PAINLEVEL: NO PAIN (0)

## 2022-05-05 ASSESSMENT — PATIENT HEALTH QUESTIONNAIRE - PHQ9
5. POOR APPETITE OR OVEREATING: SEVERAL DAYS
SUM OF ALL RESPONSES TO PHQ QUESTIONS 1-9: 2

## 2022-05-05 ASSESSMENT — ENCOUNTER SYMPTOMS
ABDOMINAL PAIN: 0
DYSURIA: 0
SORE THROAT: 0
NAUSEA: 0
PALPITATIONS: 0
NERVOUS/ANXIOUS: 0
MYALGIAS: 0
BREAST MASS: 0
PARESTHESIAS: 0
FEVER: 0
CHILLS: 0
SHORTNESS OF BREATH: 0
HEMATOCHEZIA: 0
HEMATURIA: 0
JOINT SWELLING: 0
COUGH: 0
CONSTIPATION: 1
HEARTBURN: 0
EYE PAIN: 0
HEADACHES: 0
ARTHRALGIAS: 0
DIARRHEA: 0
WEAKNESS: 0
DIZZINESS: 0
FREQUENCY: 0

## 2022-05-05 ASSESSMENT — ANXIETY QUESTIONNAIRES
2. NOT BEING ABLE TO STOP OR CONTROL WORRYING: SEVERAL DAYS
1. FEELING NERVOUS, ANXIOUS, OR ON EDGE: SEVERAL DAYS
GAD7 TOTAL SCORE: 5
IF YOU CHECKED OFF ANY PROBLEMS ON THIS QUESTIONNAIRE, HOW DIFFICULT HAVE THESE PROBLEMS MADE IT FOR YOU TO DO YOUR WORK, TAKE CARE OF THINGS AT HOME, OR GET ALONG WITH OTHER PEOPLE: SOMEWHAT DIFFICULT
5. BEING SO RESTLESS THAT IT IS HARD TO SIT STILL: NOT AT ALL
6. BECOMING EASILY ANNOYED OR IRRITABLE: SEVERAL DAYS
3. WORRYING TOO MUCH ABOUT DIFFERENT THINGS: SEVERAL DAYS
7. FEELING AFRAID AS IF SOMETHING AWFUL MIGHT HAPPEN: NOT AT ALL

## 2022-05-05 NOTE — PATIENT INSTRUCTIONS
See ob gyn for stress incontinence    Preventive Health Recommendations  Female Ages 40 to 49    Yearly exam:   See your health care provider every year in order to  Review health changes.   Discuss preventive care.    Review your medicines if your doctor prescribed any.    Get a Pap test every three years (unless you have an abnormal result and your provider advises testing more often).    If you get Pap tests with HPV test, you only need to test every 5 years, unless you have an abnormal result. You do not need a Pap test if your uterus was removed (hysterectomy) and you have not had cancer.    You should be tested each year for STDs (sexually transmitted diseases), if you're at risk.   Ask your doctor if you should have a mammogram.    Have a colonoscopy (test for colon cancer) if someone in your family has had colon cancer or polyps before age 50.     Have a cholesterol test every 5 years.     Have a diabetes test (fasting glucose) after age 45. If you are at risk for diabetes, you should have this test every 3 years.    Shots: Get a flu shot each year. Get a tetanus shot every 10 years.     Nutrition:   Eat at least 5 servings of fruits and vegetables each day.  Eat whole-grain bread, whole-wheat pasta and brown rice instead of white grains and rice.  Get adequate Calcium and Vitamin D.      Lifestyle  Exercise at least 150 minutes a week (an average of 30 minutes a day, 5 days a week). This will help you control your weight and prevent disease.  Limit alcohol to one drink per day.  No smoking.   Wear sunscreen to prevent skin cancer.  See your dentist every six months for an exam and cleaning.

## 2022-05-05 NOTE — PROGRESS NOTES
SUBJECTIVE:   CC: Joel Arthur is an 41 year old woman who presents for preventive health visit.       Patient has been advised of split billing requirements and indicates understanding: No  Healthy Habits:     Getting at least 3 servings of Calcium per day:  Yes    Bi-annual eye exam:  Yes    Dental care twice a year:  Yes    Sleep apnea or symptoms of sleep apnea:  None    Diet:  Carbohydrate counting    Frequency of exercise:  2-3 days/week    Duration of exercise:  15-30 minutes    Taking medications regularly:  Not Applicable    Medication side effects:  None    PHQ-2 Total Score: 0    Additional concerns today:  No          Anxiety Follow-Up    How are you doing with your anxiety since your last visit? No change    Are you having other symptoms that might be associated with anxiety? No    Have you had a significant life event? No     Are you feeling depressed? No    Do you have any concerns with your use of alcohol or other drugs? No    Social History     Tobacco Use     Smoking status: Former Smoker     Types: Cigarettes     Quit date: 2016     Years since quittin.3     Smokeless tobacco: Never Used     Tobacco comment: quit 1/1/10   Substance Use Topics     Alcohol use: Yes     Alcohol/week: 0.0 standard drinks     Comment: occas - 5-6 beers 2 times weekly     Drug use: No     SORAYA-7 SCORE 2018   Total Score 6     PHQ 2018   PHQ-9 Total Score 2   Q9: Thoughts of better off dead/self-harm past 2 weeks Not at all     Last PHQ-9 2022   1.  Little interest or pleasure in doing things 0   2.  Feeling down, depressed, or hopeless 0   3.  Trouble falling or staying asleep, or sleeping too much 1   4.  Feeling tired or having little energy 0   5.  Poor appetite or overeating 1   6.  Feeling bad about yourself 0   7.  Trouble concentrating 0   8.  Moving slowly or restless 0   Q9: Thoughts of better off dead/self-harm past 2 weeks 0   PHQ-9 Total Score 2   Difficulty at work, home, or with  people Not difficult at all     SORAYA-7  2022   1. Feeling nervous, anxious, or on edge 1   2. Not being able to stop or control worrying 1   3. Worrying too much about different things 1   4. Trouble relaxing 1   5. Being so restless that it is hard to sit still 0   6. Becoming easily annoyed or irritable 1   7. Feeling afraid, as if something awful might happen 0   SORAYA-7 Total Score 5   If you checked any problems, how difficult have they made it for you to do your work, take care of things at home, or get along with other people? Somewhat difficult         Today's PHQ-2 Score:   PHQ-2 (  [employer]) 2022   Q1: Little interest or pleasure in doing things 0   Q2: Feeling down, depressed or hopeless 0   PHQ-2 Score 0   PHQ-2 Total Score (12-17 Years)- Positive if 3 or more points; Administer PHQ-A if positive -   Q1: Little interest or pleasure in doing things Not at all   Q2: Feeling down, depressed or hopeless Not at all   PHQ-2 Score 0     She is doing fine not on antidepressants. Takes the propranolol occasionally. Will need refills.     Stress incontinence  Will leak with running, jumping, coughing, sneezing. Has to wear a pad. Can't run anymore. Tried kegels. Would like to seek a fix.     Abuse: Current or Past (Physical, Sexual or Emotional) - No  Do you feel safe in your environment? Yes      Social History     Tobacco Use     Smoking status: Former Smoker     Types: Cigarettes     Quit date: 2016     Years since quittin.3     Smokeless tobacco: Never Used     Tobacco comment: quit 1/1/10   Substance Use Topics     Alcohol use: Yes     Alcohol/week: 0.0 standard drinks     Comment: occas - 5-6 beers 2 times weekly         Alcohol Use 2022   Prescreen: >3 drinks/day or >7 drinks/week? No   Prescreen: >3 drinks/day or >7 drinks/week? -       Reviewed orders with patient.  Reviewed health maintenance and updated orders accordingly - Yes  BP Readings from Last 3 Encounters:   22 102/68    04/06/21 106/66   05/12/20 104/64    Wt Readings from Last 3 Encounters:   05/05/22 68.9 kg (152 lb)   04/06/21 68.5 kg (151 lb)   05/12/20 67.5 kg (148 lb 12.8 oz)                Breast Cancer Screening:    Breast CA Risk Assessment (FHS-7) 4/6/2021   Do you have a family history of breast, colon, or ovarian cancer? No / Unknown         Mammogram Screening - Offered annual screening and updated Health Maintenance for mutual plan based on risk factor consideration    Pertinent mammograms are reviewed under the imaging tab.    History of abnormal Pap smear: YES - updated in Problem List and Health Maintenance   accordingly    1/18/16 NIL, + HR HPV 18. Plan colp.   02/15/16 Colpo benign. Cotesting one year  2/17/17 Atypical glandular cells pap, + HR HPV 18. Plan colp and EMB  3/20/17 [place] [facility]x & ECC - Cervical adenocarcinoma, intestinal type. Plan visit with GYN/ONC  4/13/17 [facility] - Adenocarcinoma in situ, intestinal type, PEDRO 1. Plan hysterectomy 5/24/17 5/24/17 Hysterectomy benign. Plan annual pap smears per gyn/onc.   2/19/18 NIL/neg HPV. Continue annual pap screening per above  2/28/19 NIL vaginal pap, neg HPV. Plan annual screening.  5/12/20 NIL vaginal pap, Neg HPV. Continue annual screening per above.    4/6/21 NIL vaginal pap, Neg HPV. Plan annual screening.   3/17/22 Reminder letter  5/5/22 Appt    PAP / HPV Latest Ref Rng & Units 4/6/2021 5/12/2020 2/28/2019   PAP (Historical) - NIL NIL NIL   HPV16 NEG:Negative Negative Negative Negative   HPV18 NEG:Negative Negative Negative Negative   HRHPV NEG:Negative Negative Negative Negative     Reviewed and updated as needed this visit by clinical staff   Tobacco  Allergies  Meds   Med Hx  Surg Hx  Fam Hx  Soc Hx          Reviewed and updated as needed this visit by Provider                   Past Medical History:   Diagnosis Date     Atypical glandular cells of undetermined significance (GERONIMO) on cervical Pap smear 02/17/2017    + HR HPV 18     Cervical  high risk HPV (human papillomavirus) test positive 2017    see problem list     Chickenpox      H/O colposcopy with cervical biopsy 2017    Bx & ECC - cervical adenocarcinoma      Past Surgical History:   Procedure Laterality Date     BIOPSY VULVA N/A 2017    Procedure: BIOPSY VULVA;  Surgeon: Martha Talbert MD;  [facility]     COLPOSCOPY, CONIZATION, COMBINED N/A 2017    Procedure: COMBINED COLPOSCOPY, CONIZATION;  Surgeon: Martha Talbert MD;  [facility]     CYSTOSCOPY N/A 2017    Procedure: CYSTOSCOPY;;  Surgeon: Martha Talbert MD;  [facility]     DAVINCI HYSTERECTOMY TOTAL, SALPINGECTOMY BILATERAL N/A 2017    Procedure: DAVINCI HYSTERECTOMY TOTAL, SALPINGECTOMY BILATERAL;  DaVinci Assisted Total Laparoscopic Hysterectomy, Bilateral Salpingectomy,  Cystoscopy, Anesthesia General;  Surgeon: Martha Talbert MD;  Location[facility]     MOUTH SURGERY      wisdom teeth     REMOVE INTRAUTERINE DEVICE N/A 2017    Procedure: REMOVE INTRAUTERINE DEVICE;  Surgeon: Martha Talbert MD;  [facility]     OB History    Para Term  AB Living   2 2 2 0 0 2   SAB IAB Ectopic Multiple Live Births   0 0 0 0 2      # Outcome Date GA Lbr Real/2nd Weight Sex Delivery Anes PTL Lv   2 Term 13 39w3d 03:14 / 00:09 3.515 kg (7 lb 12 oz) F Vag-Spont EPI  JAMES      Name: RASHEL,BABY1 RYAN MALDONADO      Apgar1: 9  Apgar5: 9   1 Term 09/30/10 39w0d 18:00 3.572 kg (7 lb 14 oz) M IVD EPI N JAMES      Name: Quentin Arthur      Apgar1: 6  Apgar5: 7       Review of Systems   Constitutional: Negative for chills and fever.   HENT: Negative for congestion, ear pain, hearing loss and sore throat.    Eyes: Negative for pain and visual disturbance.   Respiratory: Negative for cough and shortness of breath.    Cardiovascular: Negative for chest pain, palpitations and peripheral edema.   Gastrointestinal: Positive for constipation. Negative for abdominal pain,  "diarrhea, heartburn, hematochezia and nausea.   Breasts:  Negative for tenderness, breast mass and discharge.   Genitourinary: Negative for dysuria, frequency, genital sores, hematuria, pelvic pain, urgency, vaginal bleeding and vaginal discharge.   Musculoskeletal: Negative for arthralgias, joint swelling and myalgias.   Skin: Negative for rash.   Neurological: Negative for dizziness, weakness, headaches and paresthesias.   Psychiatric/Behavioral: Negative for mood changes. The patient is not nervous/anxious.         OBJECTIVE:   /68 (BP Location: Right arm)   Pulse 78   Temp 97.5  F (36.4  C) (Tympanic)   Resp 20   Ht 1.575 m (5' 2\")   Wt 68.9 kg (152 lb)   LMP 05/01/2017   SpO2 100%   BMI 27.80 kg/m    Physical Exam  GENERAL: healthy, alert and no distress  EYES: Eyes grossly normal to inspection, PERRL and conjunctivae and sclerae normal  NECK: no adenopathy, no asymmetry, masses, or scars and thyroid normal to palpation  RESP: lungs clear to auscultation - no rales, rhonchi or wheezes  BREAST: normal without masses, tenderness or nipple discharge and no palpable axillary masses or adenopathy  CV: regular rate and rhythm, normal S1 S2, no S3 or S4, no murmur, click or rub, no peripheral edema and peripheral pulses strong  ABDOMEN: soft, nontender, no hepatosplenomegaly, no masses and bowel sounds normal   (female): normal female external genitalia, normal urethral meatus , vaginal mucosa pink, moist, well rugated, vaginal pap taken, and normal post-hysterectomy exam without masses.   MS: no gross musculoskeletal defects noted, no edema  SKIN: no suspicious lesions or rashes  NEURO: Normal strength and tone, mentation intact and speech normal  PSYCH: mentation appears normal, affect normal/bright        ASSESSMENT/PLAN:       ICD-10-CM    1. Routine general medical examination at a health care facility  Z00.00    2. SORAYA (generalized anxiety disorder)  F41.1 propranolol (INDERAL) 10 MG tablet   3. " "Cervical cancer screening  Z12.4 Pap Screen with HPV - recommended age 30 - 65 years     HPV Hold (Lab Only)   4. Encounter for screening mammogram for breast cancer  Z12.31 *MA Screening Digital Bilateral   5. CARDIOVASCULAR SCREENING; LDL GOAL LESS THAN 160  Z13.6 Lipid panel reflex to direct LDL Fasting     Lipid panel reflex to direct LDL Fasting   6. Screening for diabetes mellitus  Z13.1 Glucose     Glucose   7. Adenocarcinoma in situ (AIS) of uterine cervix  D06.9 CBC with platelets     CBC with platelets     CANCELED: Pap diagnostic with HPV   8. Female stress incontinence  N39.3 Ob/Gyn Referral     CBC with platelets     CBC with platelets     See ob/gyn for stress urinary incontinence    Patient has been advised of split billing requirements and indicates understanding: Yes    COUNSELING:  Reviewed preventive health counseling, as reflected in patient instructions    Estimated body mass index is 27.8 kg/m  as calculated from the following:    Height as of this encounter: 1.575 m (5' 2\").    Weight as of this encounter: 68.9 kg (152 lb).    Weight management plan: Discussed healthy diet and exercise guidelines    She reports that she quit smoking about 6 years ago. Her smoking use included cigarettes. She has never used smokeless tobacco.      Counseling Resources:  ATP IV Guidelines  Pooled Cohorts Equation Calculator  Breast Cancer Risk Calculator  BRCA-Related Cancer Risk Assessment: FHS-7 Tool  FRAX Risk Assessment  ICSI Preventive Guidelines  Dietary Guidelines for Americans, 2010  USDA's MyPlate  ASA Prophylaxis  Lung CA Screening    Tish Veloz MD  [facility]  "

## 2022-05-06 ASSESSMENT — ANXIETY QUESTIONNAIRES: GAD7 TOTAL SCORE: 5

## 2022-05-09 LAB
BKR LAB AP GYN ADEQUACY: NORMAL
BKR LAB AP GYN INTERPRETATION: NORMAL
BKR LAB AP HPV REFLEX: NORMAL
BKR LAB AP PREVIOUS ABNORMAL: NORMAL
PATH REPORT.COMMENTS IMP SPEC: NORMAL
PATH REPORT.COMMENTS IMP SPEC: NORMAL
PATH REPORT.RELEVANT HX SPEC: NORMAL

## 2022-05-11 LAB
HUMAN PAPILLOMA VIRUS 16 DNA: NEGATIVE
HUMAN PAPILLOMA VIRUS 18 DNA: NEGATIVE
HUMAN PAPILLOMA VIRUS FINAL DIAGNOSIS: NORMAL
HUMAN PAPILLOMA VIRUS OTHER HR: NEGATIVE

## 2022-05-25 ENCOUNTER — TELEPHONE (OUTPATIENT)
Dept: OBGYN | Facility: CLINIC | Age: 42
End: 2022-05-25

## 2022-05-25 ENCOUNTER — OFFICE VISIT (OUTPATIENT)
Dept: OBGYN | Facility: CLINIC | Age: 42
End: 2022-05-25
Attending: FAMILY MEDICINE
Payer: COMMERCIAL

## 2022-05-25 VITALS
WEIGHT: 154 LBS | BODY MASS INDEX: 28.34 KG/M2 | HEART RATE: 74 BPM | DIASTOLIC BLOOD PRESSURE: 68 MMHG | RESPIRATION RATE: 16 BRPM | SYSTOLIC BLOOD PRESSURE: 108 MMHG | HEIGHT: 62 IN | TEMPERATURE: 98.3 F

## 2022-05-25 DIAGNOSIS — N36.41 URETHRAL HYPERMOBILITY: ICD-10-CM

## 2022-05-25 DIAGNOSIS — N39.3 URINARY, INCONTINENCE, STRESS FEMALE: Primary | ICD-10-CM

## 2022-05-25 PROCEDURE — 99204 OFFICE O/P NEW MOD 45 MIN: CPT | Performed by: OBSTETRICS & GYNECOLOGY

## 2022-05-25 NOTE — PROGRESS NOTES
Joel Arthur is a 41 year old  with her Patient's last menstrual period was 05/01/2017. .  She presents for consultation sent by Tish Kendall  for Urinary Incontinence.  Her symptoms began 2 years ago  They were/ not associated with any trauma.  She has nocturia x 1.  She voids q 1 hour.  No UTI  She does/not  use protection.  She does/ have urine loss with sexual activity.  She has/ no history of enuresis.  She has / had vaginal deliveries.  Her largest baby was 7 # 14 oz  She had/ no  post partum incontinence or prolapse.  She has/ not  had surgery for incontinence.  Pelvic pressure    Past Surgical History:   Procedure Laterality Date     BIOPSY VULVA N/A 4/13/2017    Procedure: BIOPSY VULVA;  Surgeon: Martha Talbert MD;  [facility]     COLPOSCOPY, CONIZATION, COMBINED N/A 4/13/2017    Procedure: COMBINED COLPOSCOPY, CONIZATION;  Surgeon: Martha Talbert MD;  [facility]     CYSTOSCOPY N/A 5/24/2017    Procedure: CYSTOSCOPY;;  Surgeon: Martha Talbert MD;  [facility]     DAVINCI HYSTERECTOMY TOTAL, SALPINGECTOMY BILATERAL N/A 5/24/2017    Procedure: DAVINCI HYSTERECTOMY TOTAL, SALPINGECTOMY BILATERAL;  DaVinci Assisted Total Laparoscopic Hysterectomy, Bilateral Salpingectomy,  Cystoscopy, Anesthesia General;  Surgeon: Martha Talbert MD;  [facility]     MOUTH SURGERY      wisdom teeth     REMOVE INTRAUTERINE DEVICE N/A 4/13/2017    Procedure: REMOVE INTRAUTERINE DEVICE;  Surgeon: Martha Talbert MD;  [facility]       Past Medical History:   Diagnosis Date     Atypical glandular cells of undetermined significance (GERONIMO) on cervical Pap smear 02/17/2017    + HR HPV 18     Cervical high risk HPV (human papillomavirus) test positive 1/18/16, 2017    see problem list     Chickenpox      H/O colposcopy with cervical biopsy 03/20/2017    Bx & ECC - cervical adenocarcinoma       latanoprost (XALATAN) 0.005 % ophthalmic solution,   Multiple Vitamins-Minerals  "(WOMENS MULTI VITAMIN & MINERAL PO), Take by mouth daily   propranolol (INDERAL) 10 MG tablet, Take 1 tablet (10 mg) by mouth 3 times daily as needed (anxiety) (Patient not taking: Reported on 5/25/2022)    No current facility-administered medications on file prior to visit.         Allergies   Allergen Reactions     Diflucan [Fluconazole] Itching and Swelling     Tylenol W/Codeine [Acetaminophen-Codeine] Nausea and Vomiting       /68 (BP Location: Left arm, Patient Position: Sitting, Cuff Size: Adult Regular)   Pulse 74   Temp 98.3  F (36.8  C)   Resp 16   Ht 1.575 m (5' 2\")   Wt 69.9 kg (154 lb)   LMP 05/01/2017   BMI 28.17 kg/m    NTND, no organomegaly, normal BS in all quadrants, without rebound or guarding  normal vagina and vulva,   Urethra midposition and hypermobile.  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 The types of anesthesia were reviewed. The pre and post-op expectations were noted. We discussed the risks and benefits of the above procedures. The risk of bleeding, infection and damage to other organs was reviewed. The possibility of the need for postoperative temporary bladder catheter use was discussed.  No guarantees were made.  She did express understanding and desires to proceed with surgery.            Kuldip Bolivar MD    "

## 2022-05-25 NOTE — TELEPHONE ENCOUNTER
"[id number]  Joel Arthur    You are now scheduled for surgery at [facility].  Below are the details for your surgery.  Please read the \"Preparing for Your Surgery\" instructions and let us know if you have any questions.    Type of surgery: VAGINAL SLING, WITH CYSTOSCOPY (Bilateral)     Surgeon:  Kuldip Bolivar MD  Location of surgery: [facility] OR    Date of surgery: 7-11-22    Time: 9:30am   Arrival Time: 8:3.0am    Time can change, to be confirmed a couple of days prior by pre-op surgery nurse.    Pre-Op Appt Date: Patient to schedule with a PCP or Family Practice Provider within 30 days to the surgery.  Post-Op Appt Date: To be determined by provider     COVID test 2-4 days prior at [facility]  Date 7-8-22  Time 4:00 PM    Packet sent out: Yes  Pre-cert/Authorization completed:  TBD by Financial Securing Office.   MA Sterilization/Hysterectomy Acknowledgment Consent signed: Not Applicable    [facility]  [phone]    Fax: [phone]  Same Day Surgery [phone]  Fax: [phone]  [facility] [phone]    "

## 2022-06-28 ENCOUNTER — ANCILLARY PROCEDURE (OUTPATIENT)
Dept: MAMMOGRAPHY | Facility: CLINIC | Age: 42
End: 2022-06-28
Attending: FAMILY MEDICINE
Payer: COMMERCIAL

## 2022-06-28 DIAGNOSIS — Z12.31 ENCOUNTER FOR SCREENING MAMMOGRAM FOR BREAST CANCER: ICD-10-CM

## 2022-06-28 PROCEDURE — 77067 SCR MAMMO BI INCL CAD: CPT | Mod: TC | Performed by: RADIOLOGY

## 2022-07-08 ENCOUNTER — LAB (OUTPATIENT)
Dept: LAB | Facility: CLINIC | Age: 42
End: 2022-07-08

## 2022-07-08 ENCOUNTER — OFFICE VISIT (OUTPATIENT)
Dept: FAMILY MEDICINE | Facility: CLINIC | Age: 42
End: 2022-07-08
Payer: COMMERCIAL

## 2022-07-08 VITALS
RESPIRATION RATE: 18 BRPM | TEMPERATURE: 98.8 F | HEART RATE: 76 BPM | BODY MASS INDEX: 28.34 KG/M2 | DIASTOLIC BLOOD PRESSURE: 60 MMHG | SYSTOLIC BLOOD PRESSURE: 112 MMHG | WEIGHT: 154 LBS | HEIGHT: 62 IN

## 2022-07-08 DIAGNOSIS — N39.3 FEMALE STRESS INCONTINENCE: ICD-10-CM

## 2022-07-08 DIAGNOSIS — N81.11 CYSTOCELE, MIDLINE: ICD-10-CM

## 2022-07-08 DIAGNOSIS — Z20.822 ENCOUNTER FOR LABORATORY TESTING FOR COVID-19 VIRUS: ICD-10-CM

## 2022-07-08 DIAGNOSIS — Z01.818 PRE-OP EXAM: Primary | ICD-10-CM

## 2022-07-08 LAB
ANION GAP SERPL CALCULATED.3IONS-SCNC: 5 MMOL/L (ref 3–14)
BUN SERPL-MCNC: 16 MG/DL (ref 7–30)
CALCIUM SERPL-MCNC: 9.1 MG/DL (ref 8.5–10.1)
CHLORIDE BLD-SCNC: 104 MMOL/L (ref 94–109)
CO2 SERPL-SCNC: 30 MMOL/L (ref 20–32)
CREAT SERPL-MCNC: 0.67 MG/DL (ref 0.52–1.04)
ERYTHROCYTE [DISTWIDTH] IN BLOOD BY AUTOMATED COUNT: 12.1 % (ref 10–15)
GFR SERPL CREATININE-BSD FRML MDRD: >90 ML/MIN/1.73M2
GLUCOSE BLD-MCNC: 77 MG/DL (ref 70–99)
HCT VFR BLD AUTO: 39.1 % (ref 35–47)
HGB BLD-MCNC: 13.1 G/DL (ref 11.7–15.7)
MCH RBC QN AUTO: 31.9 PG (ref 26.5–33)
MCHC RBC AUTO-ENTMCNC: 33.5 G/DL (ref 31.5–36.5)
MCV RBC AUTO: 95 FL (ref 78–100)
PLATELET # BLD AUTO: 202 10E3/UL (ref 150–450)
POTASSIUM BLD-SCNC: 4.1 MMOL/L (ref 3.4–5.3)
RBC # BLD AUTO: 4.11 10E6/UL (ref 3.8–5.2)
SODIUM SERPL-SCNC: 139 MMOL/L (ref 133–144)
WBC # BLD AUTO: 6.3 10E3/UL (ref 4–11)

## 2022-07-08 PROCEDURE — U0003 INFECTIOUS AGENT DETECTION BY NUCLEIC ACID (DNA OR RNA); SEVERE ACUTE RESPIRATORY SYNDROME CORONAVIRUS 2 (SARS-COV-2) (CORONAVIRUS DISEASE [COVID-19]), AMPLIFIED PROBE TECHNIQUE, MAKING USE OF HIGH THROUGHPUT TECHNOLOGIES AS DESCRIBED BY CMS-2020-01-R: HCPCS

## 2022-07-08 PROCEDURE — U0005 INFEC AGEN DETEC AMPLI PROBE: HCPCS

## 2022-07-08 NOTE — PROGRESS NOTES
[facility]  [address] [place] [id number]  Phone: [phone]  Fax: [phone]  Primary Provider: Tish Kendall  Pre-op Performing Provider: BECK NARANJO      PREOPERATIVE EVALUATION:  Today's date: 7/8/2022    Joel Arthur is a 42 year old female who presents for a preoperative evaluation.    Surgical Information:  Surgery/Procedure: VAGINAL SLING, WITH CYSTOSCOPY  Surgery Location: [facility]  Surgeon: Kuldip Bolivar MD  Surgery Date: 7/11/22  Time of Surgery:   Where patient plans to recover: At home with family  Fax number for surgical facility: Note does not need to be faxed, will be available electronically in Epic.    Type of Anesthesia Anticipated: Choice    Assessment & Plan     The proposed surgical procedure is considered LOW risk.       Diagnosis Comments   1. Pre-op exam  Basic metabolic panel  (Ca, Cl, CO2, Creat, Gluc, K, Na, BUN), CBC with platelets    2. Cystocele, midline     3. Female stress incontinence              Risks and Recommendations:  The patient has the following additional risks and recommendations for perioperative complications:   - No identified additional risk factors other than previously addressed    Medication Instructions:   - ibuprofen (Advil, Motrin): HOLD 1 day before surgery.     RECOMMENDATION:  APPROVAL GIVEN to proceed with proposed procedure, without further diagnostic evaluation.          Subjective     HPI related to upcoming procedure: VAGINAL SLING, WITH CYSTOSCOPY      Preop Questions 7/8/2022   1. Have you ever had a heart attack or stroke? No   2. Have you ever had surgery on your heart or blood vessels, such as a stent placement, a coronary artery bypass, or surgery on an artery in your head, neck, heart, or legs? No   3. Do you have chest pain with activity? No   4. Do you have a history of  heart failure? No   5. Do you currently have a cold, bronchitis or symptoms of other  infection? No   6. Do you have a cough, shortness of breath, or wheezing? No   7. Do you or anyone in your family have previous history of blood clots? No   8. Do you or does anyone in your family have a serious bleeding problem such as prolonged bleeding following surgeries or cuts? No   9. Have you ever had problems with anemia or been told to take iron pills? No   10. Have you had any abnormal blood loss such as black, tarry or bloody stools, or abnormal vaginal bleeding? No   11. Have you ever had a blood transfusion? No   12. Are you willing to have a blood transfusion if it is medically needed before, during, or after your surgery? Yes   13. Have you or any of your relatives ever had problems with anesthesia? No   14. Do you have sleep apnea, excessive snoring or daytime drowsiness? No   15. Do you have any artifical heart valves or other implanted medical devices like a pacemaker, defibrillator, or continuous glucose monitor? No   16. Do you have artificial joints? No   17. Are you allergic to latex? No   18. Is there any chance that you may be pregnant? No       Health Care Directive:  Patient does not have a Health Care Directive or Living Will: Discussed advance care planning with patient; however, patient declined at this time.    Preoperative Review of :   reviewed - no record of controlled substances prescribed.          Review of Systems  Constitutional, neuro, ENT, endocrine, pulmonary, cardiac, gastrointestinal, genitourinary, musculoskeletal, integument and psychiatric systems are negative, except as otherwise noted.    Patient Active Problem List    Diagnosis Date Noted     Other specified glaucoma 02/19/2018     Priority: Medium     Follows with ophthalmologist.        SORAYA (generalized anxiety disorder) 07/06/2017     Priority: Medium     FH: hemochromatosis 06/21/2017     Priority: Medium     Lichen sclerosus of female genitalia 02/15/2016     Priority: Medium     Dermatitis 01/18/2016      Priority: Medium     Adenocarcinoma in situ (AIS) of uterine cervix 01/18/2016     Priority: Medium     1/18/16 NIL, + HR HPV 18. Plan colp.   02/15/16 Colpo benign. Cotesting one year  2/17/17 Atypical glandular cells pap, + HR HPV 18. Plan colp and EMB  3/20/17 [place] [facility]x [facility] - Cervical adenocarcinoma, intestinal type. Plan visit with GYN/ONC  4/13/17 [facility] - Adenocarcinoma in situ, intestinal type, PEDRO 1. Plan hysterectomy 5/24/17 5/24/17 Hysterectomy benign. Plan annual pap smears per gyn/onc.   2/19/18 NIL/neg HPV. Continue annual pap screening per above  2/28/19 NIL vaginal pap, neg HPV. Plan annual screening.  5/12/20 NIL vaginal pap, Neg HPV. Continue annual screening per above.    4/6/21 NIL vaginal pap, Neg HPV. Plan annual screening.   5/5/22 NIL vaginal pap, neg HPV. Plan: 3 year cotesting schedule       Acne 11/09/2010     Priority: Medium     CARDIOVASCULAR SCREENING; LDL GOAL LESS THAN 160 10/31/2010     Priority: Medium     Varicose veins 08/10/2010     Priority: Medium      Past Medical History:   Diagnosis Date     Atypical glandular cells of undetermined significance (GERONIMO) on cervical Pap smear 02/17/2017    + HR HPV 18     Cervical high risk HPV (human papillomavirus) test positive 1/18/16, 2017    see problem list     Chickenpox      H/O colposcopy with cervical biopsy 03/20/2017    Bx & ECC - cervical adenocarcinoma     Past Surgical History:   Procedure Laterality Date     BIOPSY VULVA N/A 4/13/2017    Procedure: BIOPSY VULVA;  Surgeon: Martha Talbert MD;  [facility]     COLPOSCOPY, CONIZATION, COMBINED N/A 4/13/2017    Procedure: COMBINED COLPOSCOPY, CONIZATION;  Surgeon: Martha Talbert MD;  [facility]     CYSTOSCOPY N/A 5/24/2017    Procedure: CYSTOSCOPY;;  Surgeon: Martha Talbert MD;  [facility]     DAVINCI HYSTERECTOMY TOTAL, SALPINGECTOMY BILATERAL N/A 5/24/2017    Procedure: DAVINCI HYSTERECTOMY TOTAL, SALPINGECTOMY BILATERAL;  DaVinci Assisted Total  Laparoscopic Hysterectomy, Bilateral Salpingectomy,  Cystoscopy, Anesthesia General;  Surgeon: Martha Talbert MD;  [facility]     MOUTH SURGERY      wisdom teeth     REMOVE INTRAUTERINE DEVICE N/A 2017    Procedure: REMOVE INTRAUTERINE DEVICE;  Surgeon: Martha Talbert MD;  [facility]     Current Outpatient Medications   Medication Sig Dispense Refill     latanoprost (XALATAN) 0.005 % ophthalmic solution        Multiple Vitamins-Minerals (WOMENS MULTI VITAMIN & MINERAL PO) Take by mouth daily        propranolol (INDERAL) 10 MG tablet Take 1 tablet (10 mg) by mouth 3 times daily as needed (anxiety) 30 tablet 3       Allergies   Allergen Reactions     Diflucan [Fluconazole] Itching and Swelling     Tylenol W/Codeine [Acetaminophen-Codeine] Nausea and Vomiting        Social History     Tobacco Use     Smoking status: Former Smoker     Types: Cigarettes     Quit date: 2016     Years since quittin.5     Smokeless tobacco: Never Used     Tobacco comment: quit 1/1/10   Substance Use Topics     Alcohol use: Yes     Alcohol/week: 0.0 standard drinks     Comment: occas - 5-6 beers 2 times weekly     Family History   Problem Relation Age of Onset     Thyroid Disease Mother      Lipids Mother      Depression Mother      Heart Disease Maternal Grandmother         MI     Cancer Maternal Grandfather         kidney     History   Drug Use No         Objective     LMP 2017     Physical Exam    GENERAL APPEARANCE: healthy, alert and no distress     EYES: EOMI, PERRL     HENT: ear canals and TM's normal and nose and mouth without ulcers or lesions     NECK: no adenopathy, no asymmetry, masses, or scars and thyroid normal to palpation     RESP: lungs clear to auscultation - no rales, rhonchi or wheezes     CV: regular rates and rhythm, normal S1 S2, no S3 or S4 and no murmur, click or rub     MS: extremities normal- no gross deformities noted, no evidence of inflammation in joints, FROM in all  extremities.     SKIN: no suspicious lesions or rashes     NEURO: Normal strength and tone, sensory exam grossly normal, mentation intact and speech normal     PSYCH: mentation appears normal. and affect normal/bright     LYMPHATICS: No cervical adenopathy    Recent Labs   Lab Test 05/05/22  0903   HGB 13.0           Diagnostics:  Recent Results (from the past 24 hour(s))   Basic metabolic panel  (Ca, Cl, CO2, Creat, Gluc, K, Na, BUN)    Collection Time: 07/08/22  4:17 PM   Result Value Ref Range    Sodium 139 133 - 144 mmol/L    Potassium 4.1 3.4 - 5.3 mmol/L    Chloride 104 94 - 109 mmol/L    Carbon Dioxide (CO2) 30 20 - 32 mmol/L    Anion Gap 5 3 - 14 mmol/L    Urea Nitrogen 16 7 - 30 mg/dL    Creatinine 0.67 0.52 - 1.04 mg/dL    Calcium 9.1 8.5 - 10.1 mg/dL    Glucose 77 70 - 99 mg/dL    GFR Estimate >90 >60 mL/min/1.73m2   CBC with platelets    Collection Time: 07/08/22  4:17 PM   Result Value Ref Range    WBC Count 6.3 4.0 - 11.0 10e3/uL    RBC Count 4.11 3.80 - 5.20 10e6/uL    Hemoglobin 13.1 11.7 - 15.7 g/dL    Hematocrit 39.1 35.0 - 47.0 %    MCV 95 78 - 100 fL    MCH 31.9 26.5 - 33.0 pg    MCHC 33.5 31.5 - 36.5 g/dL    RDW 12.1 10.0 - 15.0 %    Platelet Count 202 150 - 450 10e3/uL      No EKG required, no history of coronary heart disease, significant arrhythmia, peripheral arterial disease or other structural heart disease.    Revised Cardiac Risk Index (RCRI):  The patient has the following serious cardiovascular risks for perioperative complications:   - No serious cardiac risks = 0 points     RCRI Interpretation: 0 points: Class I (very low risk - 0.4% complication rate)           Signed Electronically by: LUX Morrison CNP  Copy of this evaluation report is provided to requesting physician.

## 2022-07-08 NOTE — H&P (VIEW-ONLY)
[facility]  [address] [place] [address]  Phone: [phone]  Fax: [phone]  Primary Provider: Tish Kendall  Pre-op Performing Provider: BECK NARANJO      PREOPERATIVE EVALUATION:  Today's date: 7/8/2022    Joel Arthur is a 42 year old female who presents for a preoperative evaluation.    Surgical Information:  Surgery/Procedure: VAGINAL SLING, WITH CYSTOSCOPY  Surgery Location: [facility]  Surgeon: Kuldip Bolivar MD  Surgery Date: 7/11/22  Time of Surgery:   Where patient plans to recover: At home with family  Fax number for surgical facility: Note does not need to be faxed, will be available electronically in Epic.    Type of Anesthesia Anticipated: Choice    Assessment & Plan     The proposed surgical procedure is considered LOW risk.       Diagnosis Comments   1. Pre-op exam  Basic metabolic panel  (Ca, Cl, CO2, Creat, Gluc, K, Na, BUN), CBC with platelets    2. Cystocele, midline     3. Female stress incontinence              Risks and Recommendations:  The patient has the following additional risks and recommendations for perioperative complications:   - No identified additional risk factors other than previously addressed    Medication Instructions:   - ibuprofen (Advil, Motrin): HOLD 1 day before surgery.     RECOMMENDATION:  APPROVAL GIVEN to proceed with proposed procedure, without further diagnostic evaluation.          Subjective     HPI related to upcoming procedure: VAGINAL SLING, WITH CYSTOSCOPY      Preop Questions 7/8/2022   1. Have you ever had a heart attack or stroke? No   2. Have you ever had surgery on your heart or blood vessels, such as a stent placement, a coronary artery bypass, or surgery on an artery in your head, neck, heart, or legs? No   3. Do you have chest pain with activity? No   4. Do you have a history of  heart failure? No   5. Do you currently have a cold, bronchitis or symptoms of other  infection? No   6. Do you have a cough, shortness of breath, or wheezing? No   7. Do you or anyone in your family have previous history of blood clots? No   8. Do you or does anyone in your family have a serious bleeding problem such as prolonged bleeding following surgeries or cuts? No   9. Have you ever had problems with anemia or been told to take iron pills? No   10. Have you had any abnormal blood loss such as black, tarry or bloody stools, or abnormal vaginal bleeding? No   11. Have you ever had a blood transfusion? No   12. Are you willing to have a blood transfusion if it is medically needed before, during, or after your surgery? Yes   13. Have you or any of your relatives ever had problems with anesthesia? No   14. Do you have sleep apnea, excessive snoring or daytime drowsiness? No   15. Do you have any artifical heart valves or other implanted medical devices like a pacemaker, defibrillator, or continuous glucose monitor? No   16. Do you have artificial joints? No   17. Are you allergic to latex? No   18. Is there any chance that you may be pregnant? No       Health Care Directive:  Patient does not have a Health Care Directive or Living Will: Discussed advance care planning with patient; however, patient declined at this time.    Preoperative Review of :   reviewed - no record of controlled substances prescribed.          Review of Systems  Constitutional, neuro, ENT, endocrine, pulmonary, cardiac, gastrointestinal, genitourinary, musculoskeletal, integument and psychiatric systems are negative, except as otherwise noted.    Patient Active Problem List    Diagnosis Date Noted     Other specified glaucoma 02/19/2018     Priority: Medium     Follows with ophthalmologist.        SORAYA (generalized anxiety disorder) 07/06/2017     Priority: Medium     FH: hemochromatosis 06/21/2017     Priority: Medium     Lichen sclerosus of female genitalia 02/15/2016     Priority: Medium     Dermatitis 01/18/2016      Priority: Medium     Adenocarcinoma in situ (AIS) of uterine cervix 01/18/2016     Priority: Medium     1/18/16 NIL, + HR HPV 18. Plan colp.   02/15/16 Colpo benign. Cotesting one year  2/17/17 Atypical glandular cells pap, + HR HPV 18. Plan colp and EMB  3/20/17 [place] [facility]x & [facility] - Cervical adenocarcinoma, intestinal type. Plan visit with GYN/ONC  4/13/17 [facility] - Adenocarcinoma in situ, intestinal type, PEDRO 1. Plan hysterectomy 5/24/17 5/24/17 Hysterectomy benign. Plan annual pap smears per gyn/onc.   2/19/18 NIL/neg HPV. Continue annual pap screening per above  2/28/19 NIL vaginal pap, neg HPV. Plan annual screening.  5/12/20 NIL vaginal pap, Neg HPV. Continue annual screening per above.    4/6/21 NIL vaginal pap, Neg HPV. Plan annual screening.   5/5/22 NIL vaginal pap, neg HPV. Plan: 3 year cotesting schedule       Acne 11/09/2010     Priority: Medium     CARDIOVASCULAR SCREENING; LDL GOAL LESS THAN 160 10/31/2010     Priority: Medium     Varicose veins 08/10/2010     Priority: Medium      Past Medical History:   Diagnosis Date     Atypical glandular cells of undetermined significance (GERONIMO) on cervical Pap smear 02/17/2017    + HR HPV 18     Cervical high risk HPV (human papillomavirus) test positive 1/18/16, 2017    see problem list     Chickenpox      H/O colposcopy with cervical biopsy 03/20/2017    Bx & ECC - cervical adenocarcinoma     Past Surgical History:   Procedure Laterality Date     BIOPSY VULVA N/A 4/13/2017    Procedure: BIOPSY VULVA;  Surgeon: Martha Talbert MD;  [facility]     COLPOSCOPY, CONIZATION, COMBINED N/A 4/13/2017    Procedure: COMBINED COLPOSCOPY, CONIZATION;  Surgeon: Martha Talbert MD;  [facility]     CYSTOSCOPY N/A 5/24/2017    Procedure: CYSTOSCOPY;;  Surgeon: Martha Talbert MD;  [facility]     DAVINCI HYSTERECTOMY TOTAL, SALPINGECTOMY BILATERAL N/A 5/24/2017    Procedure: DAVINCI HYSTERECTOMY TOTAL, SALPINGECTOMY BILATERAL;  DaVinci Assisted Total  Laparoscopic Hysterectomy, Bilateral Salpingectomy,  Cystoscopy, Anesthesia General;  Surgeon: Martha Talbert MD;  [facility]     MOUTH SURGERY      wisdom teeth     REMOVE INTRAUTERINE DEVICE N/A 2017    Procedure: REMOVE INTRAUTERINE DEVICE;  Surgeon: Martha Talbert MD;  [facility]     Current Outpatient Medications   Medication Sig Dispense Refill     latanoprost (XALATAN) 0.005 % ophthalmic solution        Multiple Vitamins-Minerals (WOMENS MULTI VITAMIN & MINERAL PO) Take by mouth daily        propranolol (INDERAL) 10 MG tablet Take 1 tablet (10 mg) by mouth 3 times daily as needed (anxiety) 30 tablet 3       Allergies   Allergen Reactions     Diflucan [Fluconazole] Itching and Swelling     Tylenol W/Codeine [Acetaminophen-Codeine] Nausea and Vomiting        Social History     Tobacco Use     Smoking status: Former Smoker     Types: Cigarettes     Quit date: 2016     Years since quittin.5     Smokeless tobacco: Never Used     Tobacco comment: quit 1/1/10   Substance Use Topics     Alcohol use: Yes     Alcohol/week: 0.0 standard drinks     Comment: occas - 5-6 beers 2 times weekly     Family History   Problem Relation Age of Onset     Thyroid Disease Mother      Lipids Mother      Depression Mother      Heart Disease Maternal Grandmother         MI     Cancer Maternal Grandfather         kidney     History   Drug Use No         Objective     LMP 2017     Physical Exam    GENERAL APPEARANCE: healthy, alert and no distress     EYES: EOMI, PERRL     HENT: ear canals and TM's normal and nose and mouth without ulcers or lesions     NECK: no adenopathy, no asymmetry, masses, or scars and thyroid normal to palpation     RESP: lungs clear to auscultation - no rales, rhonchi or wheezes     CV: regular rates and rhythm, normal S1 S2, no S3 or S4 and no murmur, click or rub     MS: extremities normal- no gross deformities noted, no evidence of inflammation in joints, FROM in all  extremities.     SKIN: no suspicious lesions or rashes     NEURO: Normal strength and tone, sensory exam grossly normal, mentation intact and speech normal     PSYCH: mentation appears normal. and affect normal/bright     LYMPHATICS: No cervical adenopathy    Recent Labs   Lab Test 05/05/22  0903   HGB 13.0           Diagnostics:  Recent Results (from the past 24 hour(s))   Basic metabolic panel  (Ca, Cl, CO2, Creat, Gluc, K, Na, BUN)    Collection Time: 07/08/22  4:17 PM   Result Value Ref Range    Sodium 139 133 - 144 mmol/L    Potassium 4.1 3.4 - 5.3 mmol/L    Chloride 104 94 - 109 mmol/L    Carbon Dioxide (CO2) 30 20 - 32 mmol/L    Anion Gap 5 3 - 14 mmol/L    Urea Nitrogen 16 7 - 30 mg/dL    Creatinine 0.67 0.52 - 1.04 mg/dL    Calcium 9.1 8.5 - 10.1 mg/dL    Glucose 77 70 - 99 mg/dL    GFR Estimate >90 >60 mL/min/1.73m2   CBC with platelets    Collection Time: 07/08/22  4:17 PM   Result Value Ref Range    WBC Count 6.3 4.0 - 11.0 10e3/uL    RBC Count 4.11 3.80 - 5.20 10e6/uL    Hemoglobin 13.1 11.7 - 15.7 g/dL    Hematocrit 39.1 35.0 - 47.0 %    MCV 95 78 - 100 fL    MCH 31.9 26.5 - 33.0 pg    MCHC 33.5 31.5 - 36.5 g/dL    RDW 12.1 10.0 - 15.0 %    Platelet Count 202 150 - 450 10e3/uL      No EKG required, no history of coronary heart disease, significant arrhythmia, peripheral arterial disease or other structural heart disease.    Revised Cardiac Risk Index (RCRI):  The patient has the following serious cardiovascular risks for perioperative complications:   - No serious cardiac risks = 0 points     RCRI Interpretation: 0 points: Class I (very low risk - 0.4% complication rate)           Signed Electronically by: LUX Morrison CNP  Copy of this evaluation report is provided to requesting physician.

## 2022-07-09 LAB — SARS-COV-2 RNA RESP QL NAA+PROBE: NEGATIVE

## 2022-07-09 NOTE — RESULT ENCOUNTER NOTE
Please Notify Joel  of test results basic metabolic panel was within normal limits indicating good kidney functions and your CBC was within normal limits indicating good hemoglobin levels preop has been completed    Nancy Garner CNP

## 2022-07-11 ENCOUNTER — HOSPITAL ENCOUNTER (OUTPATIENT)
Facility: CLINIC | Age: 42
Discharge: HOME | End: 2022-07-11
Attending: OBSTETRICS & GYNECOLOGY | Admitting: OBSTETRICS & GYNECOLOGY
Payer: COMMERCIAL

## 2022-07-11 ENCOUNTER — ANESTHESIA (OUTPATIENT)
Dept: SURGERY | Facility: CLINIC | Age: 42
End: 2022-07-11
Payer: COMMERCIAL

## 2022-07-11 ENCOUNTER — ANESTHESIA EVENT (OUTPATIENT)
Dept: SURGERY | Facility: CLINIC | Age: 42
End: 2022-07-11
Payer: COMMERCIAL

## 2022-07-11 VITALS
BODY MASS INDEX: 28.34 KG/M2 | HEIGHT: 62 IN | HEART RATE: 55 BPM | RESPIRATION RATE: 16 BRPM | OXYGEN SATURATION: 100 % | SYSTOLIC BLOOD PRESSURE: 117 MMHG | DIASTOLIC BLOOD PRESSURE: 43 MMHG | WEIGHT: 154 LBS | TEMPERATURE: 97.7 F

## 2022-07-11 DIAGNOSIS — R32 INCONTINENCE IN FEMALE: Primary | ICD-10-CM

## 2022-07-11 PROCEDURE — 999N000141 HC STATISTIC PRE-PROCEDURE NURSING ASSESSMENT: Performed by: OBSTETRICS & GYNECOLOGY

## 2022-07-11 PROCEDURE — 710N000012 HC RECOVERY PHASE 2, PER MINUTE: Performed by: OBSTETRICS & GYNECOLOGY

## 2022-07-11 PROCEDURE — 370N000017 HC ANESTHESIA TECHNICAL FEE, PER MIN: Performed by: OBSTETRICS & GYNECOLOGY

## 2022-07-11 PROCEDURE — C1771 REP DEV, URINARY, W/SLING: HCPCS | Performed by: OBSTETRICS & GYNECOLOGY

## 2022-07-11 PROCEDURE — 250N000011 HC RX IP 250 OP 636: Performed by: OBSTETRICS & GYNECOLOGY

## 2022-07-11 PROCEDURE — 360N000076 HC SURGERY LEVEL 3, PER MIN: Performed by: OBSTETRICS & GYNECOLOGY

## 2022-07-11 PROCEDURE — 250N000009 HC RX 250: Performed by: OBSTETRICS & GYNECOLOGY

## 2022-07-11 PROCEDURE — 57288 REPAIR BLADDER DEFECT: CPT | Mod: GC | Performed by: OBSTETRICS & GYNECOLOGY

## 2022-07-11 PROCEDURE — 258N000003 HC RX IP 258 OP 636: Performed by: OBSTETRICS & GYNECOLOGY

## 2022-07-11 PROCEDURE — 250N000011 HC RX IP 250 OP 636: Performed by: NURSE ANESTHETIST, CERTIFIED REGISTERED

## 2022-07-11 PROCEDURE — 272N000001 HC OR GENERAL SUPPLY STERILE: Performed by: OBSTETRICS & GYNECOLOGY

## 2022-07-11 PROCEDURE — 250N000009 HC RX 250: Performed by: NURSE ANESTHETIST, CERTIFIED REGISTERED

## 2022-07-11 PROCEDURE — 250N000011 HC RX IP 250 OP 636

## 2022-07-11 PROCEDURE — 250N000013 HC RX MED GY IP 250 OP 250 PS 637: Performed by: OBSTETRICS & GYNECOLOGY

## 2022-07-11 PROCEDURE — 250N000013 HC RX MED GY IP 250 OP 250 PS 637: Performed by: NURSE ANESTHETIST, CERTIFIED REGISTERED

## 2022-07-11 DEVICE — MESH SLING ADVANTAGE MID URETHERAL BLUE M0068502120: Type: IMPLANTABLE DEVICE | Site: VAGINA | Status: FUNCTIONAL

## 2022-07-11 RX ORDER — LIDOCAINE HYDROCHLORIDE 10 MG/ML
INJECTION, SOLUTION INFILTRATION; PERINEURAL PRN
Status: DISCONTINUED | OUTPATIENT
Start: 2022-07-11 | End: 2022-07-11

## 2022-07-11 RX ORDER — DEXAMETHASONE SODIUM PHOSPHATE 4 MG/ML
4 INJECTION, SOLUTION INTRA-ARTICULAR; INTRALESIONAL; INTRAMUSCULAR; INTRAVENOUS; SOFT TISSUE EVERY 10 MIN PRN
Status: DISCONTINUED | OUTPATIENT
Start: 2022-07-11 | End: 2022-07-11 | Stop reason: HOSPADM

## 2022-07-11 RX ORDER — ACETAMINOPHEN 325 MG/1
975 TABLET ORAL EVERY 6 HOURS PRN
Qty: 50 TABLET | Refills: 0 | COMMUNITY
Start: 2022-07-11 | End: 2023-09-26

## 2022-07-11 RX ORDER — PROPOFOL 10 MG/ML
INJECTION, EMULSION INTRAVENOUS PRN
Status: DISCONTINUED | OUTPATIENT
Start: 2022-07-11 | End: 2022-07-11

## 2022-07-11 RX ORDER — KETOROLAC TROMETHAMINE 30 MG/ML
INJECTION, SOLUTION INTRAMUSCULAR; INTRAVENOUS PRN
Status: DISCONTINUED | OUTPATIENT
Start: 2022-07-11 | End: 2022-07-11

## 2022-07-11 RX ORDER — ATROPA BELLADONNA AND OPIUM 16.2; 3 MG/1; MG/1
SUPPOSITORY RECTAL PRN
Status: DISCONTINUED | OUTPATIENT
Start: 2022-07-11 | End: 2022-07-11 | Stop reason: HOSPADM

## 2022-07-11 RX ORDER — ONDANSETRON 2 MG/ML
INJECTION INTRAMUSCULAR; INTRAVENOUS PRN
Status: DISCONTINUED | OUTPATIENT
Start: 2022-07-11 | End: 2022-07-11

## 2022-07-11 RX ORDER — MEPERIDINE HYDROCHLORIDE 25 MG/ML
12.5 INJECTION INTRAMUSCULAR; INTRAVENOUS; SUBCUTANEOUS
Status: DISCONTINUED | OUTPATIENT
Start: 2022-07-11 | End: 2022-07-11 | Stop reason: HOSPADM

## 2022-07-11 RX ORDER — NALOXONE HYDROCHLORIDE 0.4 MG/ML
0.4 INJECTION, SOLUTION INTRAMUSCULAR; INTRAVENOUS; SUBCUTANEOUS
Status: DISCONTINUED | OUTPATIENT
Start: 2022-07-11 | End: 2022-07-11 | Stop reason: HOSPADM

## 2022-07-11 RX ORDER — NALOXONE HYDROCHLORIDE 0.4 MG/ML
0.2 INJECTION, SOLUTION INTRAMUSCULAR; INTRAVENOUS; SUBCUTANEOUS
Status: DISCONTINUED | OUTPATIENT
Start: 2022-07-11 | End: 2022-07-11 | Stop reason: HOSPADM

## 2022-07-11 RX ORDER — PROPOFOL 10 MG/ML
INJECTION, EMULSION INTRAVENOUS CONTINUOUS PRN
Status: DISCONTINUED | OUTPATIENT
Start: 2022-07-11 | End: 2022-07-11

## 2022-07-11 RX ORDER — ONDANSETRON 4 MG/1
4 TABLET, ORALLY DISINTEGRATING ORAL EVERY 30 MIN PRN
Status: DISCONTINUED | OUTPATIENT
Start: 2022-07-11 | End: 2022-07-11 | Stop reason: HOSPADM

## 2022-07-11 RX ORDER — DIMENHYDRINATE 50 MG/ML
25 INJECTION, SOLUTION INTRAMUSCULAR; INTRAVENOUS
Status: DISCONTINUED | OUTPATIENT
Start: 2022-07-11 | End: 2022-07-11 | Stop reason: HOSPADM

## 2022-07-11 RX ORDER — CEFAZOLIN SODIUM/WATER 2 G/20 ML
2 SYRINGE (ML) INTRAVENOUS SEE ADMIN INSTRUCTIONS
Status: DISCONTINUED | OUTPATIENT
Start: 2022-07-11 | End: 2022-07-11 | Stop reason: HOSPADM

## 2022-07-11 RX ORDER — ACETAMINOPHEN 325 MG/1
975 TABLET ORAL ONCE
Status: COMPLETED | OUTPATIENT
Start: 2022-07-11 | End: 2022-07-11

## 2022-07-11 RX ORDER — FENTANYL CITRATE 50 UG/ML
25 INJECTION, SOLUTION INTRAMUSCULAR; INTRAVENOUS
Status: DISCONTINUED | OUTPATIENT
Start: 2022-07-11 | End: 2022-07-11 | Stop reason: HOSPADM

## 2022-07-11 RX ORDER — ACETAMINOPHEN 325 MG/1
975 TABLET ORAL ONCE
Status: DISCONTINUED | OUTPATIENT
Start: 2022-07-11 | End: 2022-07-11 | Stop reason: HOSPADM

## 2022-07-11 RX ORDER — IBUPROFEN 200 MG
800 TABLET ORAL EVERY 8 HOURS PRN
COMMUNITY
Start: 2022-07-11 | End: 2023-09-26

## 2022-07-11 RX ORDER — FENTANYL CITRATE 50 UG/ML
25 INJECTION, SOLUTION INTRAMUSCULAR; INTRAVENOUS EVERY 5 MIN PRN
Status: DISCONTINUED | OUTPATIENT
Start: 2022-07-11 | End: 2022-07-11 | Stop reason: HOSPADM

## 2022-07-11 RX ORDER — OXYCODONE HYDROCHLORIDE 5 MG/1
5 TABLET ORAL EVERY 4 HOURS PRN
Status: DISCONTINUED | OUTPATIENT
Start: 2022-07-11 | End: 2022-07-11 | Stop reason: HOSPADM

## 2022-07-11 RX ORDER — FENTANYL CITRATE 50 UG/ML
INJECTION, SOLUTION INTRAMUSCULAR; INTRAVENOUS PRN
Status: DISCONTINUED | OUTPATIENT
Start: 2022-07-11 | End: 2022-07-11

## 2022-07-11 RX ORDER — ONDANSETRON 2 MG/ML
4 INJECTION INTRAMUSCULAR; INTRAVENOUS EVERY 30 MIN PRN
Status: DISCONTINUED | OUTPATIENT
Start: 2022-07-11 | End: 2022-07-11 | Stop reason: HOSPADM

## 2022-07-11 RX ORDER — OXYCODONE HYDROCHLORIDE 5 MG/1
5-10 TABLET ORAL EVERY 4 HOURS PRN
Qty: 6 TABLET | Refills: 0 | Status: SHIPPED | OUTPATIENT
Start: 2022-07-11 | End: 2022-07-12

## 2022-07-11 RX ORDER — SODIUM CHLORIDE, SODIUM LACTATE, POTASSIUM CHLORIDE, CALCIUM CHLORIDE 600; 310; 30; 20 MG/100ML; MG/100ML; MG/100ML; MG/100ML
INJECTION, SOLUTION INTRAVENOUS CONTINUOUS
Status: DISCONTINUED | OUTPATIENT
Start: 2022-07-11 | End: 2022-07-11 | Stop reason: HOSPADM

## 2022-07-11 RX ORDER — GABAPENTIN 300 MG/1
300 CAPSULE ORAL ONCE
Status: COMPLETED | OUTPATIENT
Start: 2022-07-11 | End: 2022-07-11

## 2022-07-11 RX ORDER — HYDROMORPHONE HCL IN WATER/PF 6 MG/30 ML
0.2 PATIENT CONTROLLED ANALGESIA SYRINGE INTRAVENOUS EVERY 5 MIN PRN
Status: DISCONTINUED | OUTPATIENT
Start: 2022-07-11 | End: 2022-07-11 | Stop reason: HOSPADM

## 2022-07-11 RX ORDER — PHENAZOPYRIDINE HYDROCHLORIDE 200 MG/1
200 TABLET, FILM COATED ORAL ONCE
Status: COMPLETED | OUTPATIENT
Start: 2022-07-11 | End: 2022-07-11

## 2022-07-11 RX ORDER — BUPIVACAINE HYDROCHLORIDE AND EPINEPHRINE 5; 5 MG/ML; UG/ML
INJECTION, SOLUTION PERINEURAL PRN
Status: DISCONTINUED | OUTPATIENT
Start: 2022-07-11 | End: 2022-07-11 | Stop reason: HOSPADM

## 2022-07-11 RX ORDER — CEFAZOLIN SODIUM/WATER 2 G/20 ML
2 SYRINGE (ML) INTRAVENOUS
Status: COMPLETED | OUTPATIENT
Start: 2022-07-11 | End: 2022-07-11

## 2022-07-11 RX ORDER — OXYCODONE HYDROCHLORIDE 5 MG/1
5 TABLET ORAL
Status: DISCONTINUED | OUTPATIENT
Start: 2022-07-11 | End: 2022-07-11 | Stop reason: HOSPADM

## 2022-07-11 RX ORDER — IBUPROFEN 400 MG/1
800 TABLET, FILM COATED ORAL ONCE
Status: DISCONTINUED | OUTPATIENT
Start: 2022-07-11 | End: 2022-07-11 | Stop reason: HOSPADM

## 2022-07-11 RX ADMIN — KETOROLAC TROMETHAMINE 30 MG: 30 INJECTION, SOLUTION INTRAMUSCULAR at 10:14

## 2022-07-11 RX ADMIN — FENTANYL CITRATE 50 MCG: 50 INJECTION, SOLUTION INTRAMUSCULAR; INTRAVENOUS at 09:41

## 2022-07-11 RX ADMIN — PROPOFOL 40 MG: 10 INJECTION, EMULSION INTRAVENOUS at 10:11

## 2022-07-11 RX ADMIN — HYDROMORPHONE HYDROCHLORIDE 0.2 MG: 0.2 INJECTION, SOLUTION INTRAMUSCULAR; INTRAVENOUS; SUBCUTANEOUS at 11:44

## 2022-07-11 RX ADMIN — FENTANYL CITRATE 50 MCG: 50 INJECTION, SOLUTION INTRAMUSCULAR; INTRAVENOUS at 09:39

## 2022-07-11 RX ADMIN — ONDANSETRON 4 MG: 2 INJECTION INTRAMUSCULAR; INTRAVENOUS at 09:52

## 2022-07-11 RX ADMIN — ACETAMINOPHEN 325MG 975 MG: 325 TABLET ORAL at 09:00

## 2022-07-11 RX ADMIN — MIDAZOLAM 2 MG: 1 INJECTION INTRAMUSCULAR; INTRAVENOUS at 09:31

## 2022-07-11 RX ADMIN — LIDOCAINE HYDROCHLORIDE 50 MG: 10 INJECTION, SOLUTION INFILTRATION; PERINEURAL at 09:38

## 2022-07-11 RX ADMIN — MEPERIDINE HYDROCHLORIDE 12.5 MG: 25 INJECTION INTRAMUSCULAR; INTRAVENOUS; SUBCUTANEOUS at 11:34

## 2022-07-11 RX ADMIN — PROPOFOL 200 MG: 10 INJECTION, EMULSION INTRAVENOUS at 09:38

## 2022-07-11 RX ADMIN — GABAPENTIN 300 MG: 300 CAPSULE ORAL at 09:00

## 2022-07-11 RX ADMIN — PROPOFOL 300 MCG/KG/MIN: 10 INJECTION, EMULSION INTRAVENOUS at 09:39

## 2022-07-11 RX ADMIN — PHENAZOPYRIDINE HYDROCHLORIDE 200 MG: 200 TABLET ORAL at 09:00

## 2022-07-11 RX ADMIN — SODIUM CHLORIDE, POTASSIUM CHLORIDE, SODIUM LACTATE AND CALCIUM CHLORIDE: 600; 310; 30; 20 INJECTION, SOLUTION INTRAVENOUS at 08:55

## 2022-07-11 RX ADMIN — Medication 2 G: at 09:30

## 2022-07-11 RX ADMIN — LIDOCAINE HYDROCHLORIDE 0.1 ML: 10 INJECTION, SOLUTION EPIDURAL; INFILTRATION; INTRACAUDAL; PERINEURAL at 08:55

## 2022-07-11 ASSESSMENT — LIFESTYLE VARIABLES: TOBACCO_USE: 1

## 2022-07-11 NOTE — INTERVAL H&P NOTE
"I have reviewed the surgical (or preoperative) H&P that is linked to this encounter, and examined the patient. There are no significant changes    Clinical Conditions Present on Arrival:  Clinically Significant Risk Factors Present on Admission                   # Overweight: Estimated body mass index is 28.17 kg/m  as calculated from the following:    Height as of this encounter: 1.575 m (5' 2\").    Weight as of this encounter: 69.9 kg (154 lb).       "

## 2022-07-11 NOTE — DISCHARGE INSTRUCTIONS
Same Day Surgery Discharge Instructions  Special Precautions After Surgery - Adult    It is not unusual to feel lightheaded or faint, up to 24 hours after surgery or while taking pain medication.  If you have these symptoms; sit for a few minutes before standing and have someone assist you when getting up.  You should rest and relax for the next 24 hours and must have someone stay with you for at least 24 hours after your discharge.  DO NOT DRIVE any vehicle or operate mechanical equipment for 24 hours following the end of your surgery.  DO NOT DRIVE while taking narcotic pain medications that have been prescribed by your physician.  If you had a limb operated on, you must be able to use it fully to drive.  DO NOT drink alcoholic beverages for 24 hours following surgery or while taking prescription pain medication.  Drink clear liquids (apple juice, ginger ale, broth, 7-Up, etc.).  Progress to your regular diet as you feel able.  Any questions call your physician and do not make important decisions for 24 hours.    Nausea and Vomiting: Nausea and vomiting can occur any time after receiving anesthesia. If you experience nausea and vomiting we encourage you to move to a clear liquid diet and advance your diet as tolerated. If nausea and vomiting do not improve within 12 hours please call the surgeon or present to the Emergency department.     Break-through Bleeding: If your experience bleeding from your surgical site apply pressure and additional dressing per nurse instruction. For simple problems such as a saturated dressing, you may need to reinforce the dressing with more gauze and tape and put slight pressure on the site. If bleeding does not subside contact the surgeon or present to the Emergency Department.    Post-op Infection: If you develop a fever of 100.4 or greater, have pus like drainage, redness, swelling or severe pain at the surgical site not alleviated with pain medications; please  contact the surgeon or present to the Emergency Department.   __________________________________________________________________________________________________________________________________  IMPORTANT NUMBERS:    [facility] [place] Main Number:  [phone], [phone]  Pharmacy:  [phone]  Same Day Surgery:  [phone], Monday - Thursday until 8:30 p.m., Fridays until 6:00 p.m.  Urgent Care:  [phone]  Nurse Advice Line:  [phone]                                                                         OB Clinic:  [phone]

## 2022-07-11 NOTE — ANESTHESIA PROCEDURE NOTES
Airway       Patient location during procedure: OR  Staff -        Other Anesthesia Staff: Washington Flores       Performed By: SRNAIndications and Patient Condition       Indications for airway management: jake-procedural       Induction type:intravenous       Mask difficulty assessment: 1 - vent by mask    Final Airway Details       Final airway type: supraglottic airway    Supraglottic Airway Details        Type: LMA       Brand: Air-Q       LMA size: 4.5    Post intubation assessment        Placement verified by: capnometry, equal breath sounds and chest rise        Number of attempts at approach: 2       Number of other approaches attempted: 1 (LMA 3.5 Air-Q used first but too small)       Secured with: silk tape       Ease of procedure: easy       Dentition: Intact and Unchanged

## 2022-07-11 NOTE — ANESTHESIA PREPROCEDURE EVALUATION
Anesthesia Pre-Procedure Evaluation    Patient: Joel Arthur   MRN: [id number] : 1980        Procedure : Procedure(s):  VAGINAL SLING, WITH CYSTOSCOPY          Past Medical History:   Diagnosis Date     Atypical glandular cells of undetermined significance (GERONIMO) on cervical Pap smear 2017    + HR HPV 18     Cervical high risk HPV (human papillomavirus) test positive 2017    see problem list     Chickenpox      H/O colposcopy with cervical biopsy 2017    Bx & ECC - cervical adenocarcinoma      Past Surgical History:   Procedure Laterality Date     BIOPSY VULVA N/A 2017    Procedure: BIOPSY VULVA;  Surgeon: Martha Talbert MD;  Location: [facility] OR     COLPOSCOPY, CONIZATION, COMBINED N/A 2017    Procedure: COMBINED COLPOSCOPY, CONIZATION;  Surgeon: Martha Talbert MD;  Location: [facility] OR     CYSTOSCOPY N/A 2017    Procedure: CYSTOSCOPY;;  Surgeon: Martha Talbert MD;  Location: [facility] OR     DAVINCI HYSTERECTOMY TOTAL, SALPINGECTOMY BILATERAL N/A 2017    Procedure: DAVINCI HYSTERECTOMY TOTAL, SALPINGECTOMY BILATERAL;  DaVinci Assisted Total Laparoscopic Hysterectomy, Bilateral Salpingectomy,  Cystoscopy, Anesthesia General;  Surgeon: Martha Talbert MD;  Location: [facility] OR     MOUTH SURGERY      wisdom teeth     REMOVE INTRAUTERINE DEVICE N/A 2017    Procedure: REMOVE INTRAUTERINE DEVICE;  Surgeon: Martha Talbert MD;  Location: [facility] OR      Allergies   Allergen Reactions     Diflucan [Fluconazole] Itching and Swelling     Tylenol W/Codeine [Acetaminophen-Codeine] Nausea and Vomiting      Social History     Tobacco Use     Smoking status: Former Smoker     Types: Cigarettes     Quit date: 2016     Years since quittin.5     Smokeless tobacco: Never Used     Tobacco comment: quit 1/1/10   Substance Use Topics     Alcohol use: Yes     Alcohol/week: 0.0 standard drinks     Comment: occas - 5-6 beers 2 times weekly      Wt Readings from Last 1  Encounters:   07/08/22 69.9 kg (154 lb)        Anesthesia Evaluation   Pt has had prior anesthetic. Type: General.        ROS/MED HX  ENT/Pulmonary:     (+) tobacco use, Past use,     Neurologic:       Cardiovascular:       METS/Exercise Tolerance: >4 METS    Hematologic:       Musculoskeletal:       GI/Hepatic:       Renal/Genitourinary:       Endo:       Psychiatric/Substance Use:     (+) psychiatric history anxiety     Infectious Disease:       Malignancy:   (+) Malignancy, History of Other.Other CA S/P hysterectomy for uterine Ca 2017 Remission status post Surgery.    Other:            Physical Exam    Airway        Mallampati: II   TM distance: > 3 FB   Neck ROM: full   Mouth opening: > 3 cm    Respiratory Devices and Support         Dental  no notable dental history         Cardiovascular   cardiovascular exam normal          Pulmonary   pulmonary exam normal                OUTSIDE LABS:  CBC:   Lab Results   Component Value Date    WBC 6.3 07/08/2022    WBC 6.1 05/05/2022    HGB 13.1 07/08/2022    HGB 13.0 05/05/2022    HCT 39.1 07/08/2022    HCT 39.7 05/05/2022     07/08/2022     05/05/2022     BMP:   Lab Results   Component Value Date     07/08/2022    POTASSIUM 4.1 07/08/2022    POTASSIUM 4.1 05/24/2017    CHLORIDE 104 07/08/2022    CO2 30 07/08/2022    BUN 16 07/08/2022    CR 0.67 07/08/2022    CR 0.80 05/24/2017    GLC 77 07/08/2022    GLC 83 05/05/2022     COAGS: No results found for: PTT, INR, FIBR  POC:   Lab Results   Component Value Date    HCG Negative 05/24/2017     HEPATIC: No results found for: ALBUMIN, PROTTOTAL, ALT, AST, GGT, ALKPHOS, BILITOTAL, BILIDIRECT, ENIO  OTHER:   Lab Results   Component Value Date    MO 9.1 07/08/2022       Anesthesia Plan    ASA Status:  2   NPO Status:  NPO Appropriate    Anesthesia Type: General.     - Airway: LMA   Induction: Intravenous, Propofol.   Maintenance: Balanced.        Consents    Anesthesia Plan(s) and associated risks, benefits,  and realistic alternatives discussed. Questions answered and patient/representative(s) expressed understanding.     - Discussed: Risks, Benefits and Alternatives for BOTH SEDATION and the PROCEDURE were discussed     - Discussed with:  Patient      - Extended Intubation/Ventilatory Support Discussed: No.      - Patient is DNR/DNI Status: No    Use of blood products discussed: No .     Postoperative Care    Pain management: IV analgesics, Oral pain medications, Multi-modal analgesia.   PONV prophylaxis: Ondansetron (or other 5HT-3), Background Propofol Infusion     Comments:                Washington Flores

## 2022-07-11 NOTE — ANESTHESIA CARE TRANSFER NOTE
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    Anesthesia Type:   General     Note:    Oropharynx: oropharynx clear of all foreign objects and spontaneously breathing  Level of Consciousness: drowsy  Oxygen Supplementation: face mask  Level of Supplemental Oxygen (L/min / FiO2): 8  Independent Airway: airway patency satisfactory and stable  Dentition: dentition unchanged  Vital Signs Stable: post-procedure vital signs reviewed and stable  Report to RN Given: handoff report given  Patient transferred to: Phase II    Handoff Report: Identifed the Patient, Identified the Reponsible Provider, Reviewed the pertinent medical history, Discussed the surgical course, Reviewed Intra-OP anesthesia mangement and issues during anesthesia, Set expectations for post-procedure period and Allowed opportunity for questions and acknowledgement of understanding      Vitals:  Vitals Value Taken Time   /52    Temp 36.5  C (97.7  F) 07/11/22 1030   Pulse 85    Resp 16 07/11/22 1030   SpO2 97 % 07/11/22 1032   Vitals shown include unvalidated device data.    Electronically Signed By: Washington Flores  July 11, 2022  10:33 AM

## 2022-07-11 NOTE — OP NOTE
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]   Post-operative diagnosis: Same   Procedure: Cystoscopy  TVT sling   Surgeon: Kuldip Bolivar MD   Assistant(s): MARTA Olivera Present- observing   Anesthesia: General Laryngeal Mask Airway Anesthesia   Estimated blood loss: 15 ml   Total IV fluids: (See anesthesia record)  550 ml   Blood transfusion: No transfusion was given during surgery   Total urine output: (See anesthesia record)  225 ml   Drains: Sexton catheter   Specimens: None   Findings: Surgical absence of the uterus and cervix  Urethral hypermobility  Normal urethra and bladder mucosa  bilateral ureteral efflux   Complications: None   Condition: Stable   Comments: Joel Arthur   1980  [id number]      Joel Arthur  presented for the above procedure.  She has urinary incontinence and urethral hypermobility, is s/p hysterectomy  I met with Joel  and her , Kapil and discussed the planned procedure as well as the expected post operative course.  Risks of complications were noted and postoperative signs to watch for outlined.  Questions were answered and consent signed.  She was taken to the OR [facility] where she was placed in the supine position. She underwent General anesthesia with endotracheal intubation.  She was then placed in the Dorso-lithotomy position in [facility].  An examination under anesthesia was performed that showed: urethral hypermobility with a deflection of >40 degrees  She was prepped and draped.  A timeout was held confirming her identity and proposed procedures. All were in agreement.       A Sexton catheter was placed in the bladder to straight drainage.  This drained Pyridium stained urine.  The midportion of the urethra was identified and marked with Allis forceps.  Anterior vaginal mucosa was instilled with a dilute solution of epinephrine  with 0.5% bupivacaine.  Hydro dissection was also performed bilaterally in the submucosal area.  Midportion of the urethra was incised sharply.  Blunt dissection was performed bilaterally from the midline to the urogenital diaphragm.  50 cc of sterile saline was injected in this space of Retzius, guided by vaginal finger.  The Sexton was then removed.  Cystoscopy was performed.  There is bilateral flow of the Pyridium stained urine documented.  The bladder was then emptied and using the cystoscope as a rigid device protecting the urethra, the TVT sling was placed initially on the right.  Once the sling was placed cystoscopy was performed showing no injury to the bladder or urethra.  Bladder was again emptied and the left was placed without difficulty.  Hematoma formed in the suprapubic area on the left.  Cystoscopy again confirmed that there was no injury to the bladder.    Cystoscope was removed and Sexton catheter replaced into the bladder.  #8 Hegar dilator was placed between the urethra and the sling and the sling arms elevated.  Leaving the Hegar dilator in place, the plastic was removed from the sling arms.  The Sexton catheter was removed and cystoscopy performed.  Bilateral ureteral flow was again confirmed.  The cystoscope was removed the Sexton cath  return to the bladder to straight drainage.  Sling arms were trimmed at the skin and Dermabond was placed on the sites.  Vaginal mucosa was closed with interrupted figure-of-eight's sutures of 40 Vicryl.  A BNO suppository was placed rectally.    Sponge & needle counts were correct.  She tolerated procedure well returned to PACU in good condition.  Kuldip Bolivar MD

## 2022-07-11 NOTE — ANESTHESIA POSTPROCEDURE EVALUATION
Patient: Joel Arthur    Procedure: Procedure(s):  VAGINAL SLING, WITH CYSTOSCOPY       Anesthesia Type:  General    Note:  Disposition: Outpatient   Postop Pain Control: Uneventful            Sign Out: Well controlled pain   PONV: No   Neuro/Psych: Uneventful            Sign Out: Acceptable/Baseline neuro status   Airway/Respiratory: Uneventful            Sign Out: Acceptable/Baseline resp. status   CV/Hemodynamics: Uneventful            Sign Out: Acceptable CV status; No obvious hypovolemia; No obvious fluid overload   Other NRE: NONE   DID A NON-ROUTINE EVENT OCCUR? No           Last vitals:  Vitals Value Taken Time   /43 07/11/22 1145   Temp 36.5  C (97.7  F) 07/11/22 1030   Pulse 55 07/11/22 1145   Resp 16 07/11/22 1030   SpO2 100 % 07/11/22 1150   Vitals shown include unvalidated device data.    Electronically Signed By: Mayur Sandoval CRNA, APRN CRNA  July 11, 2022  12:06 PM

## 2022-07-12 ENCOUNTER — TELEPHONE (OUTPATIENT)
Dept: OBGYN | Facility: CLINIC | Age: 42
End: 2022-07-12

## 2022-07-12 DIAGNOSIS — R32 INCONTINENCE IN FEMALE: ICD-10-CM

## 2022-07-12 RX ORDER — OXYCODONE HYDROCHLORIDE 5 MG/1
5-10 TABLET ORAL EVERY 4 HOURS PRN
Qty: 20 TABLET | Refills: 0 | Status: SHIPPED | OUTPATIENT
Start: 2022-07-12 | End: 2023-07-18

## 2022-07-12 NOTE — PROVIDER NOTIFICATION
07/12/22 1110   General Information   Contact made? Yes   Which attempt is this? 1   Person Interviewed ju   Relationship to Patient self   Physical Condition (summarize discussion in comments)   Have you had trouble breathing? No   Have you had a sore throat? No   Have you felt nauseous or vomited? No  (able to eat and drink ok)   Have you had a fever? No   Have you had any problems urinating? No  (urinating fine)   Have you had abnormal draining or bleeding at the wound site? No   Have you been feeling aches and pains? Yes   Can you describe the aches and pains? 6/10   Have you been prescribed medications to manage pain? Yes   How well is your pain controlled? Fair   How well was pain controlled during your first 24 hours at home? Fair   Do you have any additional concerns? No   Quality of Information and Care (summarize discussion in comments)   Were you instructed after surgery well enough to care for yourself at home? Yes   Did you receive adequate information on how to control your pain? Yes   Are you satisfied with the care you received? No

## 2022-07-12 NOTE — TELEPHONE ENCOUNTER
"Return call to patient.  Spoke with patient on the phone.    S-(situation): POD #1 requesting refill on pain medication. \" I have 3 left but don't want to run out and be left with nothing.\"    Patient taking 1 tablet Oxycodone every 6 hours.   Patient also taking Ibuprofen and Tylenol around the clock as prescribed.  Patient slept well last night, up at 2 am for Ibuprofen.    Patient reports pain at 5/10 right now, this morning 8/10 when she awoke.  Patient reports \" really bad bruising on one side that I am icing.\"    Patient reports urinating fine  No bowel movement   Taking stool softener  Patient reports she is not passing any gas  Patient reports feeling \"rumbles\" in her abdomen    Patient reports normal diet.  nausea this morning  No vomiting    Pharmacy is [facility] in [place]    B-(background): 7/12 cystoscopy, TVT sling    A-(assessment): POD#1 post op pain, requesting refill for future use, currently has 3 tablet left     R-(recommendations): Reassurance provided. Reviewed using Ibuprofen and Tylenol per prescription directions. Continue to ice area of bruising. Rest and take it easy. Call with fever, dysuria or uncontrolled pain.    Will send to Dr. Bolivar to advise on refill.    Nacny Simmons   [facility]  RN      "

## 2022-07-12 NOTE — TELEPHONE ENCOUNTER
Huddled with Dr. Bolivar   He refilled her prescription  He will call her later today.  Patient aware of refilled prescription.    Nancy Simmons   [facility]  RN

## 2022-07-12 NOTE — TELEPHONE ENCOUNTER
Patient is calling stating that she had surgery yesterday and she is low on pain medication ans is wondering if she is able to get more prescribed to her. oxyCODONE (ROXICODONE) 5 MG tablet. Patient is stating that she is still in a lot of pain. Please call the patient to advise.

## 2022-07-12 NOTE — PROGRESS NOTES
Patient called in, woke up at 0400 with increased pain took ibuprofen and oxycodone at 0500 now rates pain 6/10 only has 3 oxycodone left, worried about running out of pain meds. Reviewed discharge instructions with patient, able to take ibuprofen every 8 hours, tylenol every 6 and oxycodone every 4. Patient will take tylenol now ibuprofen at 1:00 pm and call dr mcgraw for more pain pills. Also reports a bruise by vagina, she is using ice for this.

## 2022-07-12 NOTE — PROVIDER NOTIFICATION
Patient called in, having increased pain starting at 0400 this morning. Took oxycodone and 800mg ibuprofen rates pain 6/10 and only has 3 pain pills left was only given 6 pills. Reviewed discharg papers with patient can take ibuprofen every 8 hours, tylenol every 6 which she had not been taking. Also encouraged patient to call dr. Bolivar office regarding more pain pills. May also call sds if continuing to have pain. Patient staes she is using ice. Patient also has a hematoma by vagina

## 2022-07-28 ENCOUNTER — HOSPITAL ENCOUNTER (OUTPATIENT)
Dept: MAMMOGRAPHY | Facility: CLINIC | Age: 42
Discharge: HOME | End: 2022-07-28
Attending: FAMILY MEDICINE
Payer: COMMERCIAL

## 2022-07-28 ENCOUNTER — HOSPITAL ENCOUNTER (OUTPATIENT)
Dept: ULTRASOUND IMAGING | Facility: CLINIC | Age: 42
Discharge: HOME | End: 2022-07-28
Attending: FAMILY MEDICINE
Payer: COMMERCIAL

## 2022-07-28 DIAGNOSIS — R92.8 ABNORMAL MAMMOGRAM: ICD-10-CM

## 2022-07-28 PROCEDURE — 76642 ULTRASOUND BREAST LIMITED: CPT | Mod: RT

## 2022-07-28 PROCEDURE — 77061 BREAST TOMOSYNTHESIS UNI: CPT | Mod: RT

## 2022-08-23 ENCOUNTER — OFFICE VISIT (OUTPATIENT)
Dept: OBGYN | Facility: CLINIC | Age: 42
End: 2022-08-23
Payer: COMMERCIAL

## 2022-08-23 VITALS
DIASTOLIC BLOOD PRESSURE: 65 MMHG | BODY MASS INDEX: 28.34 KG/M2 | HEART RATE: 66 BPM | SYSTOLIC BLOOD PRESSURE: 109 MMHG | TEMPERATURE: 99.6 F | RESPIRATION RATE: 18 BRPM | HEIGHT: 62 IN | WEIGHT: 154 LBS

## 2022-08-23 DIAGNOSIS — N36.41 URETHRAL HYPERMOBILITY: ICD-10-CM

## 2022-08-23 DIAGNOSIS — N39.3 URINARY, INCONTINENCE, STRESS FEMALE: ICD-10-CM

## 2022-08-23 DIAGNOSIS — Z98.890 POSTOPERATIVE STATE: Primary | ICD-10-CM

## 2022-08-23 PROCEDURE — 99024 POSTOP FOLLOW-UP VISIT: CPT | Performed by: OBSTETRICS & GYNECOLOGY

## 2022-08-23 NOTE — PROGRESS NOTES
"ARLENE Arthur is a 42 year old female presents for post operative check. She is  6  week(s) status post TVT sling.  She reports doing well and denies significant pain or bleeding. There were no pathological findings.    O.  Blood pressure 109/65, pulse 66, temperature 99.6  F (37.6  C), temperature source Tympanic, resp. rate 18, height 1.575 m (5' 2\"), weight 69.9 kg (154 lb), last menstrual period 05/01/2017, not currently breastfeeding.    Abd: soft, non-tender, non-distended. Incisions clear, dry, and intact without evidence of infection.    Pelvic exam: normal vagina and vulva,  Urethra mid-position well supported, no urine leakage with valsalva  exam chaperoned by nurse.      A. /P.  (Z98.890) Postoperative state  (primary encounter diagnosis)  Comment: s/p TVT sling    (N36.41) Urethral hypermobility  (N39.3) Urinary, incontinence, stress female  Comment: resolved  Plan: normal activity           Follow up prn or when due for next annual exam.    Kuldip Bolivar MD    "

## 2022-08-23 NOTE — NURSING NOTE
"Initial /65 (BP Location: Right arm, Patient Position: Chair, Cuff Size: Adult Regular)   Pulse 66   Temp 99.6  F (37.6  C) (Tympanic)   Resp 18   Ht 1.575 m (5' 2\")   Wt 69.9 kg (154 lb)   LMP 05/01/2017   Breastfeeding No   BMI 28.17 kg/m   Estimated body mass index is 28.17 kg/m  as calculated from the following:    Height as of this encounter: 1.575 m (5' 2\").    Weight as of this encounter: 69.9 kg (154 lb). .      "

## 2023-01-14 ENCOUNTER — HEALTH MAINTENANCE LETTER (OUTPATIENT)
Age: 43
End: 2023-01-14

## 2023-01-31 NOTE — OR NURSING
Discharge instructions to pt and responsible adult.  All questions regarding discharge information answered.  Pt and responsible adult verbalized understanding of all discharge instruction information given. Responsible adult picked up prescriptions.    none

## 2023-02-10 ENCOUNTER — HOSPITAL ENCOUNTER (OUTPATIENT)
Dept: MAMMOGRAPHY | Facility: CLINIC | Age: 43
Discharge: HOME | End: 2023-02-10
Attending: FAMILY MEDICINE | Admitting: FAMILY MEDICINE
Payer: COMMERCIAL

## 2023-02-10 DIAGNOSIS — R92.8 BI-RADS CATEGORY 3 MAMMOGRAM RESULT: ICD-10-CM

## 2023-02-10 PROCEDURE — 77061 BREAST TOMOSYNTHESIS UNI: CPT | Mod: RT

## 2023-02-10 NOTE — LETTER
Joel Arthur  [address] [address]        February 10, 2023  Date of Exam:     Dear Joel:    Thank you for your recent visit.  Breast Imaging Result: We are pleased to inform you that the results of your recent breast imaging show no evidence of malignancy (cancer).    If you are experiencing any breast problems such as a lump or localized pain we request that you discuss this with your health care team if you haven t already done so, as additional testing may be necessary.    As you know, early detection of cancer is very important. Although not all cancers are found through breast imaging, it is the most accurate method for early detection. A thorough examination includes a combination of breast imaging, physical examination and breast self-examination. Currently the American College of Radiology and the Society of Breast Imaging recommend breast imaging beginning at the age of 40.    A report of your breast imaging results was sent to: Tish Kendall    Your breast imaging will become part of your medical file here at [facility] for at least 10 years. You are responsible for informing any new health care team or breast imaging facility of the date and location of this examination.    We appreciate the opportunity to participate in your health care.    Sincerely,    Douglas Potter MD  [facility]

## 2023-02-10 NOTE — LETTER
Joel Arthur  [address] [address]            February 13, 2023    Date of Exam: 2/10/23      Dear Joel:    Thank you for your recent visit.    Breast Imaging Result: Your breast imaging examination showed an area that we believe is benign (not cancer). We recommend that you come back again prior to your next yearly breast imaging for short term follow-up, or in certain instances, biopsy. If you have not already scheduled this follow-up exam, please call [phone].     As you know, early detection of cancer is very important. Although not all cancers are found through breast imaging it is the most accurate method for early detection. A thorough examination includes a combination of breast imaging and a physical examination by your health care professional. Therefore, if you have not had a recent physical exam of your breasts see your health care team.    A report of your breast imaging results was sent to: Tish Kendall    Your breast imaging will become part of your medical file here at [facility] for at least 10 years. You are responsible for informing any new health care team or breast imaging facility of the date and location of this examination.    We appreciate the opportunity to participate in your health care.    Sincerely,  Douglas Potter MD  [facility]

## 2023-02-13 NOTE — RESULT ENCOUNTER NOTE
Joel  I am reviewing results in Dr Kendall's absence today.  I assume results were discussed with you at your diagnostic mammogram.  In case they were not discussed, your recent mammogram was thought to be benign.  Repeat diagnostic right breast mammogram at time of your next regular annual mammogram.  Let me know if you have questions.  Velma Evans PA-C

## 2023-03-01 DIAGNOSIS — F41.1 GAD (GENERALIZED ANXIETY DISORDER): ICD-10-CM

## 2023-03-02 RX ORDER — PROPRANOLOL HYDROCHLORIDE 10 MG/1
TABLET ORAL
Qty: 90 TABLET | Refills: 5 | Status: SHIPPED | OUTPATIENT
Start: 2023-03-02 | End: 2024-09-12

## 2023-04-20 ENCOUNTER — PATIENT OUTREACH (OUTPATIENT)
Dept: CARE COORDINATION | Facility: CLINIC | Age: 43
End: 2023-04-20
Payer: COMMERCIAL

## 2023-05-04 ENCOUNTER — PATIENT OUTREACH (OUTPATIENT)
Dept: CARE COORDINATION | Facility: CLINIC | Age: 43
End: 2023-05-04
Payer: COMMERCIAL

## 2023-07-16 ENCOUNTER — HEALTH MAINTENANCE LETTER (OUTPATIENT)
Age: 43
End: 2023-07-16

## 2023-07-18 ENCOUNTER — OFFICE VISIT (OUTPATIENT)
Dept: FAMILY MEDICINE | Facility: CLINIC | Age: 43
End: 2023-07-18
Payer: COMMERCIAL

## 2023-07-18 VITALS
DIASTOLIC BLOOD PRESSURE: 70 MMHG | RESPIRATION RATE: 20 BRPM | HEART RATE: 72 BPM | WEIGHT: 149 LBS | OXYGEN SATURATION: 98 % | BODY MASS INDEX: 27.42 KG/M2 | SYSTOLIC BLOOD PRESSURE: 104 MMHG | HEIGHT: 62 IN | TEMPERATURE: 98 F

## 2023-07-18 DIAGNOSIS — I83.813 VARICOSE VEINS OF BOTH LOWER EXTREMITIES WITH PAIN: ICD-10-CM

## 2023-07-18 DIAGNOSIS — R92.8 ABNORMAL ULTRASOUND OF BREAST: ICD-10-CM

## 2023-07-18 DIAGNOSIS — Z00.00 ROUTINE GENERAL MEDICAL EXAMINATION AT A HEALTH CARE FACILITY: Primary | ICD-10-CM

## 2023-07-18 DIAGNOSIS — D06.9 ADENOCARCINOMA IN SITU (AIS) OF UTERINE CERVIX: ICD-10-CM

## 2023-07-18 LAB
ALBUMIN SERPL BCG-MCNC: 4.5 G/DL (ref 3.5–5.2)
ALP SERPL-CCNC: 42 U/L (ref 35–104)
ALT SERPL W P-5'-P-CCNC: 14 U/L (ref 0–50)
ANION GAP SERPL CALCULATED.3IONS-SCNC: 10 MMOL/L (ref 7–15)
AST SERPL W P-5'-P-CCNC: 21 U/L (ref 0–45)
BASOPHILS # BLD AUTO: 0 10E3/UL (ref 0–0.2)
BASOPHILS NFR BLD AUTO: 1 %
BILIRUB SERPL-MCNC: 0.8 MG/DL
BUN SERPL-MCNC: 14.2 MG/DL (ref 6–20)
CALCIUM SERPL-MCNC: 9.5 MG/DL (ref 8.6–10)
CHLORIDE SERPL-SCNC: 103 MMOL/L (ref 98–107)
CREAT SERPL-MCNC: 0.76 MG/DL (ref 0.51–0.95)
DEPRECATED HCO3 PLAS-SCNC: 27 MMOL/L (ref 22–29)
EOSINOPHIL # BLD AUTO: 0.1 10E3/UL (ref 0–0.7)
EOSINOPHIL NFR BLD AUTO: 2 %
ERYTHROCYTE [DISTWIDTH] IN BLOOD BY AUTOMATED COUNT: 12.4 % (ref 10–15)
GFR SERPL CREATININE-BSD FRML MDRD: >90 ML/MIN/1.73M2
GLUCOSE SERPL-MCNC: 77 MG/DL (ref 70–99)
HCT VFR BLD AUTO: 37.4 % (ref 35–47)
HGB BLD-MCNC: 12.5 G/DL (ref 11.7–15.7)
IMM GRANULOCYTES # BLD: 0 10E3/UL
IMM GRANULOCYTES NFR BLD: 0 %
LYMPHOCYTES # BLD AUTO: 1.8 10E3/UL (ref 0.8–5.3)
LYMPHOCYTES NFR BLD AUTO: 34 %
MCH RBC QN AUTO: 32.3 PG (ref 26.5–33)
MCHC RBC AUTO-ENTMCNC: 33.4 G/DL (ref 31.5–36.5)
MCV RBC AUTO: 97 FL (ref 78–100)
MONOCYTES # BLD AUTO: 0.7 10E3/UL (ref 0–1.3)
MONOCYTES NFR BLD AUTO: 13 %
NEUTROPHILS # BLD AUTO: 2.7 10E3/UL (ref 1.6–8.3)
NEUTROPHILS NFR BLD AUTO: 51 %
PLATELET # BLD AUTO: 206 10E3/UL (ref 150–450)
POTASSIUM SERPL-SCNC: 4.4 MMOL/L (ref 3.4–5.3)
PROT SERPL-MCNC: 6.8 G/DL (ref 6.4–8.3)
RBC # BLD AUTO: 3.87 10E6/UL (ref 3.8–5.2)
SODIUM SERPL-SCNC: 140 MMOL/L (ref 136–145)
WBC # BLD AUTO: 5.4 10E3/UL (ref 4–11)

## 2023-07-18 PROCEDURE — 85025 COMPLETE CBC W/AUTO DIFF WBC: CPT | Performed by: FAMILY MEDICINE

## 2023-07-18 PROCEDURE — 99396 PREV VISIT EST AGE 40-64: CPT | Performed by: FAMILY MEDICINE

## 2023-07-18 PROCEDURE — 36415 COLL VENOUS BLD VENIPUNCTURE: CPT | Performed by: FAMILY MEDICINE

## 2023-07-18 PROCEDURE — 87624 HPV HI-RISK TYP POOLED RSLT: CPT | Performed by: FAMILY MEDICINE

## 2023-07-18 PROCEDURE — 99213 OFFICE O/P EST LOW 20 MIN: CPT | Mod: 25 | Performed by: FAMILY MEDICINE

## 2023-07-18 PROCEDURE — 80053 COMPREHEN METABOLIC PANEL: CPT | Performed by: FAMILY MEDICINE

## 2023-07-18 PROCEDURE — 88142 CYTOPATH C/V THIN LAYER: CPT | Performed by: FAMILY MEDICINE

## 2023-07-18 ASSESSMENT — PAIN SCALES - GENERAL: PAINLEVEL: NO PAIN (0)

## 2023-07-18 ASSESSMENT — ENCOUNTER SYMPTOMS
WEAKNESS: 0
DIARRHEA: 0
ARTHRALGIAS: 0
ABDOMINAL PAIN: 0
PALPITATIONS: 0
BREAST MASS: 0
SORE THROAT: 0
HEMATURIA: 0
PARESTHESIAS: 0
CHILLS: 0
DIZZINESS: 0
FEVER: 0
HEARTBURN: 0
SHORTNESS OF BREATH: 0
CONSTIPATION: 0
MYALGIAS: 0
JOINT SWELLING: 0
EYE PAIN: 0
HEADACHES: 0
FREQUENCY: 0
HEMATOCHEZIA: 0
NAUSEA: 0
DYSURIA: 0
NERVOUS/ANXIOUS: 1
COUGH: 0

## 2023-07-18 NOTE — PROGRESS NOTES
SUBJECTIVE:   CC: Joel is an 43 year old who presents for preventive health visit.       2023     3:14 PM   Additional Questions   Roomed by Dayana     Healthy Habits:     Getting at least 3 servings of Calcium per day:  Yes    Bi-annual eye exam:  Yes    Dental care twice a year:  NO    Sleep apnea or symptoms of sleep apnea:  None    Diet:  Regular (no restrictions)    Frequency of exercise:  4-5 days/week    Duration of exercise:  30-45 minutes    Taking medications regularly:  Yes    Medication side effects:  Not applicable    Additional concerns today:  No      Today's PHQ-2 Score:       2023     3:04 PM   PHQ-2 (  [employer])   Q1: Little interest or pleasure in doing things 0   Q2: Feeling down, depressed or hopeless 0   PHQ-2 Score 0   Q1: Little interest or pleasure in doing things Not at all   Q2: Feeling down, depressed or hopeless Not at all   PHQ-2 Score 0     Has varicose veins, hurt, one seems to burst and there was a bruise    Had pelvic sling and is working very well      Social History     Tobacco Use     Smoking status: Former     Packs/day: 0.00     Years: 0.00     Pack years: 0.00     Types: Cigarettes     Quit date: 2010     Years since quittin.5     Smokeless tobacco: Former     Quit date: 3/26/2017     Tobacco comments:     quit 1/1/10   Substance Use Topics     Alcohol use: Yes     Comment: occas - 5-6 beers 2 times weekly             2023     3:04 PM   Alcohol Use   Prescreen: >3 drinks/day or >7 drinks/week? No     Reviewed orders with patient.  Reviewed health maintenance and updated orders accordingly - Yes      Breast Cancer Screenin/6/2021     5:17 PM   Breast CA Risk Assessment (FHS-7)   Do you have a family history of breast, colon, or ovarian cancer? No / Unknown         Mammogram Screening - Offered annual screening and updated Health Maintenance for mutual plan based on risk factor consideration    Pertinent mammograms are reviewed under the  imaging tab.    History of abnormal Pap smear: Status post hysterectomy. Pap still indicated. For cancer    1/18/16 NIL, + HR HPV 18. Plan colp.   02/15/16 Colpo benign. Cotesting one year  2/17/17 Atypical glandular cells pap, + HR HPV 18. Plan colp and EMB  3/20/17 [facility] - Cervical adenocarcinoma, intestinal type. Plan visit with GYN/ONC  4/13/17 [facility] - Adenocarcinoma in situ, intestinal type, PEDRO 1. Plan hysterectomy 5/24/17 5/24/17 Hysterectomy benign. Plan annual pap smears per gyn/onc.   2/19/18 NIL/neg HPV. Continue annual pap screening per above  2/28/19 NIL vaginal pap, neg HPV. Plan annual screening.  5/12/20 NIL vaginal pap, Neg HPV. Continue annual screening per above.    4/6/21 NIL vaginal pap, Neg HPV. Plan annual screening.   5/5/22 NIL vaginal pap, neg HPV. Plan: 3 year cotesting schedule        Latest Ref Rng & Units 5/5/2022     8:01 AM 4/6/2021     6:13 PM 4/6/2021     5:30 PM   PAP / HPV   PAP  Negative for Intraepithelial Lesion or Malignancy (NILM)      PAP (Historical)   NIL     HPV 16 DNA Negative Negative   Negative    HPV 18 DNA Negative Negative   Negative    Other HR HPV Negative Negative   Negative      Reviewed and updated as needed this visit by clinical staff   Tobacco  Allergies  Meds              Reviewed and updated as needed this visit by Provider                 Past Medical History:   Diagnosis Date     Atypical glandular cells of undetermined significance (GERONIMO) on cervical Pap smear 02/17/2017    + HR HPV 18     Cancer (H) 02/2017     Cervical high risk HPV (human papillomavirus) test positive 1/18/16, 2017    see problem list     Chickenpox      H/O colposcopy with cervical biopsy 03/20/2017    Bx & ECC - cervical adenocarcinoma      Past Surgical History:   Procedure Laterality Date     ABDOMEN SURGERY  03/2017     BIOPSY VULVA N/A 04/13/2017    Procedure: BIOPSY VULVA;  Surgeon: Martha Talbert MD;  Location:  OR     COLPOSCOPY, CONIZATION, COMBINED  N/A 2017    Procedure: COMBINED COLPOSCOPY, CONIZATION;  Surgeon: Martha Talbert MD;  [facility]     CYSTOSCOPY N/A 2017    Procedure: CYSTOSCOPY;;  Surgeon: Martha Talbert MD;  [facility]     CYSTOSCOPY, SLING TRANSVAGINAL N/A 2022    Procedure: VAGINAL SLING, WITH CYSTOSCOPY;  Surgeon: Kuldip Bolivar MD;  [facility]     DAVINCI HYSTERECTOMY TOTAL, SALPINGECTOMY BILATERAL N/A 2017    Procedure: DAVINCI HYSTERECTOMY TOTAL, SALPINGECTOMY BILATERAL;  DaVinci Assisted Total Laparoscopic Hysterectomy, Bilateral Salpingectomy,  Cystoscopy, Anesthesia General;  Surgeon: Martha Talbert MD;  [facility]     EYE SURGERY  2017     MOUTH SURGERY      wisdom teeth     REMOVE INTRAUTERINE DEVICE N/A 2017    Procedure: REMOVE INTRAUTERINE DEVICE;  Surgeon: Martha Talbert MD;  [facility]     OB History    Para Term  AB Living   2 2 2 0 0 2   SAB IAB Ectopic Multiple Live Births   0 0 0 0 2      # Outcome Date GA Lbr Real/2nd Weight Sex Delivery Anes PTL Lv   2 Term 13 39w3d 03:14 / 00:09 3.515 kg (7 lb 12 oz) F Vag-Spont EPI  JAMES      Name: RASHEL,BABY1 RYAN MALDONADO      Apgar1: 9  Apgar5: 9   1 Term 09/30/10 39w0d 18:00 3.572 kg (7 lb 14 oz) M IVD EPI N JAMES      Name: Quentin Arthur      Apgar1: 6  Apgar5: 7       Review of Systems   Constitutional: Negative for chills and fever.   HENT: Negative for congestion, ear pain, hearing loss and sore throat.    Eyes: Negative for pain and visual disturbance.   Respiratory: Negative for cough and shortness of breath.    Cardiovascular: Negative for chest pain, palpitations and peripheral edema.   Gastrointestinal: Negative for abdominal pain, constipation, diarrhea, heartburn, hematochezia and nausea.   Breasts:  Negative for tenderness, breast mass and discharge.   Genitourinary: Negative for dysuria, frequency, genital sores, hematuria, pelvic pain, urgency, vaginal bleeding and vaginal  "discharge.   Musculoskeletal: Negative for arthralgias, joint swelling and myalgias.   Skin: Negative for rash.   Neurological: Negative for dizziness, weakness, headaches and paresthesias.   Psychiatric/Behavioral: Negative for mood changes. The patient is nervous/anxious.         OBJECTIVE:   /70 (BP Location: Right arm)   Pulse 72   Temp 98  F (36.7  C) (Tympanic)   Resp 20   Ht 1.575 m (5' 2\")   Wt 67.6 kg (149 lb)   LMP 05/01/2017   SpO2 98%   BMI 27.25 kg/m    Physical Exam  GENERAL: healthy, alert and no distress  EYES: Eyes grossly normal to inspection, PERRL and conjunctivae and sclerae normal  HENT: ear canals and TM's normal, nose and mouth without ulcers or lesions  NECK: no adenopathy, no asymmetry, masses, or scars and thyroid normal to palpation  RESP: lungs clear to auscultation - no rales, rhonchi or wheezes  BREAST: patient declines  CV: regular rate and rhythm, normal S1 S2, no S3 or S4, no murmur, click or rub, no peripheral edema   ABDOMEN: soft, nontender, no hepatosplenomegaly, no masses and bowel sounds normal   (female): normal female external genitalia, normal urethral meatus, vaginal mucosa pink, moist, well rugated, pap taken  MS: no gross musculoskeletal defects noted, no edema  SKIN: no suspicious lesions or rashes  NEURO: Normal strength and tone, mentation intact and speech normal  PSYCH: mentation appears normal, affect normal/bright      ASSESSMENT/PLAN:   Joel was seen today for physical.    Diagnoses and all orders for this visit:    Routine general medical examination at a health care facility    Varicose veins of both lower extremities with pain  Multiple spider and varicose veins  -     Vascular Surgery Referral; Future    Adenocarcinoma in situ (AIS) of uterine cervix  Unclear when to do less frequent paps  I sent a message to Dr Talbert to ask her  For now will do yearly  -     Pap diagnostic with HPV  -     CBC with platelets and differential; Future  -     " "Comprehensive metabolic panel (BMP + Alb, Alk Phos, ALT, AST, Total. Bili, TP); Future  -     CBC with platelets and differential  -     Comprehensive metabolic panel (BMP + Alb, Alk Phos, ALT, AST, Total. Bili, TP)    Abnormal ultrasound of breast  Due for ultrasound and mammogram today  -     MA Screen Bilateral w/Mohamud; Future  -     US Breast Left Complete 4 Quadrants; Future    Other orders  -     REVIEW OF HEALTH MAINTENANCE PROTOCOL ORDERS        Patient has been advised of split billing requirements and indicates understanding: Yes      COUNSELING:  Reviewed preventive health counseling, as reflected in patient instructions      BMI:   Estimated body mass index is 27.25 kg/m  as calculated from the following:    Height as of this encounter: 1.575 m (5' 2\").    Weight as of this encounter: 67.6 kg (149 lb).   Weight management plan: Discussed healthy diet and exercise guidelines      She reports that she quit smoking about 13 years ago. Her smoking use included cigarettes. She quit smokeless tobacco use about 6 years ago.      Tish Veloz MD  [facility]  "

## 2023-07-25 ENCOUNTER — PATIENT OUTREACH (OUTPATIENT)
Dept: FAMILY MEDICINE | Facility: CLINIC | Age: 43
End: 2023-07-25
Payer: COMMERCIAL

## 2023-07-25 NOTE — TELEPHONE ENCOUNTER
"7/18/23 NIL vaginal pap, neg HPV. \"Unclear when to do less frequent paps  I sent a message to Dr Talbert to ask her  For now will do yearly\"  "

## 2023-08-30 NOTE — PATIENT INSTRUCTIONS
Joel    Thank you for entrusting your care with us at the [facility].      We are prescribing some compression stockings for you. I have included different suppliers that should help you get measured and fitting to ensure proper fitting socks. You should wear these stockings as much as you can. It is especially important to wear them with long periods of standing, sitting, long car rides or if you will be flying. Compression socks should get refilled every 4-6 months. They do not need to be worn at night while in bed. It is recommended to wear compression level of 20-30mmhg or higher from toes to knees.      We will perform an ultrasound today or prior to your appointment with us in 3 months time to evaluate the valves in your veins.      We would like you to follow up in 3 months after wearing socks to see how you are doing.        Varicose Veins    Varicose veins are twisted, enlarged veins near the surface of the skin. They develop most often in the legs and ankles.    Some people may be more likely than others to get varicose veins because of several things. These include aging, pregnancy, being overweight, or because a parent has them. Standing or sitting for long periods of time can also increase risk of varicose veins.    Follow-up care is a key part of your treatment and safety. Be sure to make and go to all appointments, and call your doctor if you are having problems. It's also a good idea to know your test results and keep a list of the medicines you take.      Varicose veins are caused by weakened valves and veins in your legs. Normally, one-way valves in your veins keep blood flowing from your legs up toward your heart. When these valves don't work as they should, blood collects in your legs, and pressure builds up. The veins become weak, large, and twisted.    How can you care for yourself at home?  Wear compression stockings during the day to help relieve symptoms and improve  blood flow. Talk to your doctor about which ones to get and where to get them.  Prop up your legs at or above the level of your heart when possible. Try to do this for about 30 minutes at a time, about 3 times a day. This helps keep the blood from pooling in your lower legs and improves blood flow to the rest of your body.  Avoid sitting and standing for long periods. This puts added stress on your veins.  Stay at a healthy weight. Lose weight if you need to.  Try to take several short walks every day.  Get at least 30 minutes of exercise on most days of the week. Walking is a good choice. You also may want to do other activities, such as running, swimming, cycling, or playing tennis or team sports.  Do calf muscle exercises every day. When you are sitting down, rotate your feet at the ankles in both directions, making small circles. Extend your legs, and point and flex your feet.  Avoid crossing your legs at the knees when sitting.  Take good care of your skin. Treat cuts and scrapes on your legs right away. Keep your legs clean and moisturized to prevent drying and cracking. Prevent sunburns.  Do not smoke. Smoking can make varicose veins worse. If you need help quitting, talk to your doctor about stop-smoking programs and medicines. These can increase your chances of quitting for good.  If you bump your leg so hard that you know it is likely to bruise, prop up your leg and apply ice or cold packs right away. Apply the ice or cold pack for 10 to 20 minutes, 3 or more times a day. Put a thin cloth between the ice and your skin.  If you cut or scratch the skin over a vein, it may bleed a lot. Prop up your leg and apply firm pressure for a full 15 minutes.  If you have a blood clot in a varicose vein, you may have tenderness and swelling over the vein. The vein may feel firm. Be sure to call your doctor right away if you have these symptoms. If your doctor has told you how to care for the clot, follow the  instructions.     Care may include the following:    Prop up your leg and apply a damp cloth that is warm or cool.  Ask your doctor if you can take an over-the-counter pain medicine, such as acetaminophen (Tylenol), ibuprofen (Advil, Motrin), or naproxen (Aleve). Be safe with medicines. Read and follow all instructions on the label.    When should you call for help?     Call 911 anytime you think you may need emergency care. For example, call if:    You have sudden chest pain and shortness of breath, or you cough up blood.    Call your doctor now or seek immediate medical care if:    You have signs of a blood clot in your leg (called a deep vein thrombosis), such as:  Pain in your calf, back of the knee, thigh, or groin.  Swelling in the leg or groin.  A color change on the leg or groin. The skin may be reddish or purplish, depending on your usual skin color.  A varicose vein begins to bleed and you cannot stop it.  You have a tender lump in your leg.  You get an open sore.    Watch closely for changes in your health, and be sure to contact your doctor if:    Your varicose vein symptoms do not improve with home treatment.    Current as of: December 19, 2022  Author: Healthwise Staff  Medical Review:Jayjay Norwood MD - Family Medicine & SHEFALI Holder MD - Internal Medicine & [place] MD - Family Medicine & Braulio Toscano MD - Family Medicine & Carl Pineda MD - Diagnostic Radiology    Please bring your compression prescription to a home medical supply store. Here is a list of locations but not limited to.     [facility]  [address] [place] [address]  Phone: [phone]  Fax: [phone] [facility] Bldg.  [address] [place] [address]  Phone: [phone]  Fax: [phone]   [facility] Bld[facility]  [address] [place] [address]  Phone: [phone]  Fax: [phone] [facility]  [address] [place] [address]  Phone: [phone]  Fax: [phone]   [facility]  [address] [place] [address]  Phone: [phone] [facility]   [address] ([facility])  [place] [address]  Phone: [phone]   [place]  [address] [place] [address]      Phone: [phone]  Fax: [phone] [place]  [address] [place] [address]      Phone: [phone]  Fax: [phone]     [facility] [email or web address]  [address], [place] [address]  [phone]    [facility]  [email or web address]  [address] [place] [address]  Phone: [phone]     [address] [place] [address]  Phone: [phone]    [address] [place] [address]  Phone: [phone] [address], [place] [address]  Phone: [phone] [address], [place] [address]  Phone: [phone]    [facility] [email or web address]  [address] [place] [address]  Phone: [phone]    [address], [place] [address]  Phone: [phone] [address], [place] [address]  Phone: [phone]    Hugo Cardenas  [email or web address]  [phone]

## 2023-09-13 ENCOUNTER — HOSPITAL ENCOUNTER (OUTPATIENT)
Dept: MAMMOGRAPHY | Facility: CLINIC | Age: 43
Discharge: HOME | End: 2023-09-13
Attending: FAMILY MEDICINE | Admitting: FAMILY MEDICINE
Payer: COMMERCIAL

## 2023-09-13 DIAGNOSIS — R92.8 BI-RADS CATEGORY 3 MAMMOGRAM RESULT: ICD-10-CM

## 2023-09-13 PROCEDURE — 77066 DX MAMMO INCL CAD BI: CPT

## 2023-09-26 ENCOUNTER — OFFICE VISIT (OUTPATIENT)
Dept: VASCULAR SURGERY | Facility: CLINIC | Age: 43
End: 2023-09-26
Attending: FAMILY MEDICINE
Payer: COMMERCIAL

## 2023-09-26 VITALS
RESPIRATION RATE: 16 BRPM | TEMPERATURE: 98 F | HEART RATE: 73 BPM | DIASTOLIC BLOOD PRESSURE: 79 MMHG | SYSTOLIC BLOOD PRESSURE: 113 MMHG | OXYGEN SATURATION: 100 %

## 2023-09-26 DIAGNOSIS — I83.813 VARICOSE VEINS OF BOTH LOWER EXTREMITIES WITH PAIN: ICD-10-CM

## 2023-09-26 DIAGNOSIS — I83.93 SPIDER VEINS OF BOTH LOWER EXTREMITIES: ICD-10-CM

## 2023-09-26 DIAGNOSIS — I83.893 SYMPTOMATIC VARICOSE VEINS OF BOTH LOWER EXTREMITIES: Primary | ICD-10-CM

## 2023-09-26 PROCEDURE — 99203 OFFICE O/P NEW LOW 30 MIN: CPT | Performed by: SPECIALIST

## 2023-09-26 PROCEDURE — G0463 HOSPITAL OUTPT CLINIC VISIT: HCPCS | Performed by: SPECIALIST

## 2023-09-26 RX ORDER — TIMOLOL MALEATE 5 MG/ML
SOLUTION/ DROPS OPHTHALMIC
COMMUNITY
Start: 2023-08-19

## 2023-09-26 ASSESSMENT — PAIN SCALES - GENERAL: PAINLEVEL: NO PAIN (0)

## 2023-09-26 NOTE — PROGRESS NOTES
[facility]        Patient is here for a consult to discuss Varicose vein(s). The patient has varicose veins that are problematic in bilateral legs. Symptoms patient has been experiencing are  itching. Patient states their varicose veins are bothersome when  standing,  working, and household chores. Patient has not been using pain medication or anti-inflammatory's. Patient has not had recent imaging on legs done. Patient has not done conservative measures which include: exercise. Treatment has been unsuccessful due to no compression socks used. Educated patient to work on conservative treatments.  Start compression and RTC in 3months    Pt is currently taking no meds that would impact our treatment plan.    /79   Pulse 73   Temp 98  F (36.7  C)   Resp 16   LMP 05/01/2017   SpO2 100%

## 2023-09-26 NOTE — PROGRESS NOTES
[facility] Vein Consult      Assessment:     1. varicose veins, bilateral   2. spider veins, bilateral     Plan:     1. Treatment options of conservative therapy of stockings use, exercise, weight loss, elevating legs when possible.    2. Script for compression stockings 20-30 mm hg  3. Ultrasound to evaluate legs for incompetency of both deep and superficial system .   4. Surgical treatment, discussed briefly today.  5. Follow up: 3 months.   6. Call for any questions concerns or issues    Subjective:      Joel Arthur is a 43 year old female  who was referred by Tish Kendall  for evaluation of varicose veins. Symptoms include pain, aching, fatigue, burning, edema, and dermatitis. Patient has history of leg selling, pain and vein issues that have progressed. Pain and symptoms have affected daily living and work activities needing medications. Here for evaluation today. no stocking or compression devic use    Allergies:Diflucan [fluconazole] and Tylenol w/codeine [acetaminophen-codeine]    Past Medical History:   Diagnosis Date    Atypical glandular cells of undetermined significance (GERONIMO) on cervical Pap smear 02/17/2017    + HR HPV 18    Cancer (H) 02/2017    Cervical high risk HPV (human papillomavirus) test positive 1/18/16, 2017    see problem list    Chickenpox     H/O colposcopy with cervical biopsy 03/20/2017    Bx & ECC - cervical adenocarcinoma       Past Surgical History:   Procedure Laterality Date    ABDOMEN SURGERY  03/2017    BIOPSY VULVA N/A 04/13/2017    Procedure: BIOPSY VULVA;  Surgeon: Martha Talbert MD;  [facility]    COLPOSCOPY, CONIZATION, COMBINED N/A 04/13/2017    Procedure: COMBINED COLPOSCOPY, CONIZATION;  Surgeon: Martha Talbert MD;  [facility]    CYSTOSCOPY N/A 05/24/2017    Procedure: CYSTOSCOPY;;  Surgeon: Martha Talbert MD;  [facility]    CYSTOSCOPY, SLING TRANSVAGINAL N/A 07/11/2022    Procedure: VAGINAL SLING, WITH CYSTOSCOPY;   Surgeon: Kuldip Bolivar MD;  [facility]    DAVINCI HYSTERECTOMY TOTAL, SALPINGECTOMY BILATERAL N/A 05/24/2017    Procedure: DAVINCI HYSTERECTOMY TOTAL, SALPINGECTOMY BILATERAL;  DaVinci Assisted Total Laparoscopic Hysterectomy, Bilateral Salpingectomy,  Cystoscopy, Anesthesia General;  Surgeon: Martha Talbert MD;  [facility]    EYE SURGERY  04/2017    MOUTH SURGERY      wisdom teeth    REMOVE INTRAUTERINE DEVICE N/A 04/13/2017    Procedure: REMOVE INTRAUTERINE DEVICE;  Surgeon: Martha Talbert MD;  Location:  OR         Current Outpatient Medications:     latanoprost (XALATAN) 0.005 % ophthalmic solution, , Disp: , Rfl:     Multiple Vitamins-Minerals (WOMENS MULTI VITAMIN & MINERAL PO), Take by mouth daily , Disp: , Rfl:     propranolol (INDERAL) 10 MG tablet, Take 1 tablet by mouth three times daily as needed, Disp: 90 tablet, Rfl: 5    timolol maleate (TIMOPTIC) 0.5 % ophthalmic solution, INSTILL 1 DROP INTO EACH EYE ONCE DAILY IN THE MORNING, Disp: , Rfl:     acetaminophen (TYLENOL) 325 MG tablet, Take 3 tablets (975 mg) by mouth every 6 hours as needed for mild pain (Patient not taking: Reported on 7/18/2023), Disp: 50 tablet, Rfl: 0    ibuprofen (ADVIL/MOTRIN) 200 MG tablet, Take 4 tablets (800 mg) by mouth every 8 hours as needed for other (mild and/or inflammatory pain) (Patient not taking: Reported on 7/18/2023), Disp: , Rfl:      Family History   Problem Relation Age of Onset    Thyroid Disease Mother     Lipids Mother     Depression Mother     Other Cancer Mother     Heart Disease Maternal Grandmother         MI    Cancer Maternal Grandfather         kidney        reports that she quit smoking about 13 years ago. Her smoking use included cigarettes. She quit smokeless tobacco use about 6 years ago. She reports current alcohol use. She reports that she does not use drugs.      Review of Systems:    Pertinent items are noted in HPI.  Patient has symptomatic veins and changes of  bilateral legs. These have progressed to the point of causing symptoms on a daily basis. This causes issues with daily activities and chores such as washing dishes, vacuuming, outdoor upkeep, and standing for long lengths of time       Objective:     Vitals:    09/26/23 0932   BP: 113/79   Pulse: 73   Resp: 16   Temp: 98  F (36.7  C)   SpO2: 100%     There is no height or weight on file to calculate BMI.    EXAM:  GENERAL: This is a well-developed 43 year old female who appears her stated age  HEAD: normocephalic  HEENT: Pupils equal and reactive bilaterally  MOUTH: mucus membranes intact. Normal dentation  CARDIAC: RRR without murmur  CHEST/LUNG:  Clear to auscultation bilaterally  ABDOMEN: Soft, nontender, nondistended, no masses noted   NEUROLOGIC: Focally intact, nonfocal, alert and oriented x 3  INTEGUMENT: No open lesions or ulcers  VASCULAR: Pulses intact, symmetrical upper and lower extremities. There areskin changes consistent with chronic venous insufficiency. Varicose veins present in bilateral greater saphenous distribution. Spider veins present bilateral.        Side:: Bilateral  VCSS  PAIN:: Absent: None  Varicose Veins:: Mild: Few scattered  Venous Edema:: Absent: None  Skin Pigmentation:: Absent: None  Inflamation:: Absent: None  Induration:: Absent: None  Number of active ulcers:: 0  Active ulcer duration:: None  Active ulcer diameter:: None  Compression Therapy:: Not used or patient not compliant  VCSS Score:: 1  CEAP:: Simple varicose veins only    Imaging:    pending    Kevin Arriaza MD  General Surgery [phone]  Vascular Surgery [phone]

## 2023-09-26 NOTE — PROGRESS NOTES
[facility]        Patient is here for a consult to discuss Varicose vein(s). {:PHR,VEINNOTE}     Used to wear compression stockings but not consistently       Pt is currently taking none.    /79   Pulse 73   Temp 98  F (36.7  C)   Resp 16   LMP 05/01/2017   SpO2 100%     The provider has been notified that the patient has concerns of posterior L leg. bruising    Questions patient would like addressed today are: see above.

## 2024-01-16 ENCOUNTER — ANCILLARY PROCEDURE (OUTPATIENT)
Dept: VASCULAR ULTRASOUND | Facility: CLINIC | Age: 44
End: 2024-01-16
Attending: SPECIALIST
Payer: COMMERCIAL

## 2024-01-16 ENCOUNTER — OFFICE VISIT (OUTPATIENT)
Dept: VASCULAR SURGERY | Facility: CLINIC | Age: 44
End: 2024-01-16
Attending: SPECIALIST
Payer: COMMERCIAL

## 2024-01-16 VITALS
DIASTOLIC BLOOD PRESSURE: 74 MMHG | SYSTOLIC BLOOD PRESSURE: 108 MMHG | RESPIRATION RATE: 12 BRPM | OXYGEN SATURATION: 100 % | HEART RATE: 74 BPM

## 2024-01-16 DIAGNOSIS — I83.893 SYMPTOMATIC VARICOSE VEINS OF BOTH LOWER EXTREMITIES: ICD-10-CM

## 2024-01-16 DIAGNOSIS — I83.893 SYMPTOMATIC VARICOSE VEINS OF BOTH LOWER EXTREMITIES: Primary | ICD-10-CM

## 2024-01-16 DIAGNOSIS — I83.93 SPIDER VEINS OF BOTH LOWER EXTREMITIES: ICD-10-CM

## 2024-01-16 PROCEDURE — 93970 EXTREMITY STUDY: CPT

## 2024-01-16 PROCEDURE — 99213 OFFICE O/P EST LOW 20 MIN: CPT | Performed by: SPECIALIST

## 2024-01-16 PROCEDURE — 99213 OFFICE O/P EST LOW 20 MIN: CPT | Mod: 25 | Performed by: SPECIALIST

## 2024-01-16 ASSESSMENT — PAIN SCALES - GENERAL: PAINLEVEL: MILD PAIN (2)

## 2024-01-16 NOTE — PATIENT INSTRUCTIONS
Sclerotherapy      The most common way to treat spider and small varicose veins is sclerotherapy. This is a simple office procedure. After treatment, you can return to your daily activities right away. For best results, some veins may need to be treated more than once.    What is sclerotherapy?    Sclerotherapy is a treatment to get rid of varicose veins and spider veins. A chemical called a sclerosant is injected into the vein. This causes the vein to close.    The procedure may cause some pain. The chemical may cause burning or cramping for a few minutes at the injection site.    The procedure may take about 15 to 60 minutes. It depends on how many veins are treated and how big they are. You should be able to walk on your own after the treatment.      Common Side Effects:    Itching  The areas that were injected may itch. This is usually mild and lasts less than a day. Do not use lotions or creams on your legs until the injection sites have healed over.    Pain  It is common to have some tenderness at the injection sites. Injection of the solution can be uncomfortable, but is usually well tolerated by most patients. The tenderness is temporary, lasting 24 hours at most. Tylenol or Ibuprofen can be used, if needed, following the product directions.  Bruising  This may occur at the injections sites. Bruising may be minimized by avoiding Aspirin and Ibuprofen products for five days before each treatment session.  Hyperpigmentation  A light brown discoloration of the skin may develop along the veins in the areas injected. Approximately 20-30% of patients treated note the discoloration (which is lighter and less obvious than the veins that are being treated). The hyperpigmentation usually fades in a couple of weeks, but may take several months to a year to totally resolve. There is a 1% chance of hyperpigmentation continuing after one year.    Trapped blood  A small amount of blood may become trapped and hardened  in the veins. This may feel like a knot or cord and it may look dark blue or bruised. This is a common occurrence. You may need to return before your next treatment so this area can be drained to remove the trapped blood. This will reduce the hyperpigmentation that can occur. The chance of this occurring can be decreased with proper use of compression hose after your treatment.    Matting  Matting is the formation of new, fine  spider  veins in the area injected.  It occurs in approximately 10% of patients injected. The exact reason for this is unknown. If untreated, the matting usually resolves in 3 to 12 months, but very rarely, it can be permanent. If the matting does not fade, it can be re-injected.    Rare Side Effects:    Ulceration at injection sites  Very rarely, a small ulcer will occur at the site where a vein is injected. An ulcer can take 4 to 6 weeks to completely heal. A small scar may result.    Allergic reactions  There is a very rare incidence of an allergic reaction to the solution injected. You will be observed for such reaction and will be treated appropriately should it occur. Please inform us of any allergy history.    Pulmonary embolus/Deep vein thrombosis    This is a blood clot which moves to the lungs/a blood clot in the deep vein system. There is an extraordinarily low incidence of this complication.    Sclerotherapy: Pre-Treatment Instructions     Recommended Sessions: this will be determined after meeting the physician     Pricing: Full session - $407 -Payment is due at the time of visit following the treatment    Time Required per Treatment Session -About 45 minutes      Please come in 15 minutes before your scheduled appointment. 30 min. Sclerotherapy treatments last approximately 30 minutes. 5 min. A staff member will wipe your legs off with warm water and dry them with a wash cloth. Then you can put your compression hose on, get dressed and check out. 10 min. After your treatment,  you will be asked to walk around for 10 minutes before you get in your car.    Medications    Five days before your appointment, discontinue aspirin (Bufferin, Anacin, etc.) and Ibuprofen (Motrin, Advil, Aleve, etc.) to reduce bruising. Resume these medications the day following the treatment.    Leg Preparation    Do not shave your legs or apply any oil, lotion or powder the night before or the day of your treatment.    Clothing    Shorts: Bring a pair of loose, comfortable shorts to wear during your treatment (or you can choose to wear ours). Shoes: Bring comfortable shoes to accommodate the compression hose after your treatment. Do not wear flip-flops or thong-style sandals unless you have open-toe compression hose.    Photographs    Photos will be taken before each treatment. This helps monitor your progress.    Injections    The physician will inject your veins with the sclerotherapy solution chosen to meet your specific needs.    Compression Hose    Please bring your compression hose if you have them. They may also be reserved for you at our clinic. Compression hose must be worn immediately after each sclerotherapy treatment. The hose must be compression level 20-30, and they must be worn for 24 hours straight after your treatment. If you have never worn compression hose before, a staff member will teach you how to put them on.    You cannot have a treatment without compression hose. They are critical to the success of your treatment!    You may purchase your compression hose from us. We will measure you and have the hose available when you come for your treatment.    Cancellation and Rescheduling    If you need to cancel or reschedule your sclerotherapy treatment, please give our office at least 24 hour notice. Thank you.    SCLEROTHERAPY AFTER CARE    Immediately:  After treatment, walk for 10-15 minutes before getting in your car.  If your trip home is more than 1 hour, stop and walk around for 5-10  minutes. Avoid sitting or standing for extended periods.     First 24 hours: Wear your compression continuously, even while in bed. After the 24 hours, you may shower if you want to. Put your hose back on, unless you are going to bed. You should NOT wear compression to bed after the first 24 hours. You may fly the next day, but wear your compression.     For 5 days: Wear the compression hose for waking hours only. You may continue to wear them longer than 5 days if you prefer.     For days 5-7: Walking is encouraged, as it promotes efficient circulation in your veins. You may do activities that raise your heart rate, but do NOT run, jog, do high impact aerobics, or weight lifting. After 7 days, no activity restrictions.    Shaving: Wait a few days to shave or apply lotion.     Bathing: Do NOT take hot baths or sit in a hot tub for 7-10 days.      For 1 year: Wear SPF 30 sunscreen on your legs when in the sun. This is very important! It helps prevent darkening of the skin at the injection sites.     Medications: You may resume your usual medications, including aspirin or ibuprofen.                                                Common Things to Expect       Compression must be worn for the first 24 hours and then during the day for 5-7 days.    If larger veins are treated with ultrasound-guided sclerotherapy, you will have redness, firmness, tenderness, and swelling.  This firmness and tenderness may take 3-6 months to resolve. Ibuprofen and compression hose will aid in this process.    You will have bruising that can last up to 3 weeks. Most fading of the veins will occur between 3 and 6 weeks after treatment.    You may notice brown discoloration (hyperpigmentation) at the treatment site.  This should fade with time, but will take 3 months to 1 year to fully heal.     Some treated veins may look darker because of trapped blood within the vein. This trapped blood can be removed at a minimum of 1 month following  treatment. Larger veins are more likely to develop trapped blood.    It is very important for you to use at least SPF 30 sunscreen in order to help prevent the discoloration of your skin.    Migraines rarely occur following sclerotherapy, but are more likely in patients with a history of migraines.  Treat as you would any other migraine.    [facility]  [address], [address]  [place] [address]  Phone: [phone]      [facility]  [address] [place] [id number]  Phone: [phone]  Fax: [phone]  Fax: [phone]                                         SCLEROTHERAPY  Dr. Corin Perez  [phone]

## 2024-01-16 NOTE — PROGRESS NOTES
[facility]        Patient is here for a 3 month follow up  to discuss varicose veins. The patient has varicose veins that are problematic in bilateral legs. Symptoms patient has been experiencing are aching. Patient states their varicose veins are bothersome when sitting. Patient has done conservative measures which include: compression stockings, elevation, exercise, and weight loss. Treatment has been unsuccessful due to still having symptoms. Educated patient to work on conservative treatments.      Pt is currently taking no meds that would impact our treatment plan.    /74   Pulse 74   Resp 12   LMP 05/01/2017   SpO2 100%     The provider has been notified that the patient has no concerns.     Questions patient would like addressed today are: N/A.    Refills are needed: Yes:  compression    Has homecare services and agency name:  No

## 2024-01-26 NOTE — PROGRESS NOTES
[facility] Surgery Follow up    HPI:    43 year old year old female who returns for a follow up. Here for follow up and to discuss US of lower legs. Has been wearing compression for over three months exercises regularly and weight is normal.    Allergies:Diflucan [fluconazole] and Tylenol w/codeine [acetaminophen-codeine]    Past Medical History:   Diagnosis Date    Atypical glandular cells of undetermined significance (GERONIMO) on cervical Pap smear 02/17/2017    + HR HPV 18    Cancer (H) 02/2017    Cervical high risk HPV (human papillomavirus) test positive 1/18/16, 2017    see problem list    Chickenpox     H/O colposcopy with cervical biopsy 03/20/2017    Bx & ECC - cervical adenocarcinoma       Past Surgical History:   Procedure Laterality Date    ABDOMEN SURGERY  03/2017    BIOPSY VULVA N/A 04/13/2017    Procedure: BIOPSY VULVA;  Surgeon: Martha Talbert MD;  [facility]: [facility]    COLPOSCOPY, CONIZATION, COMBINED N/A 04/13/2017    Procedure: COMBINED COLPOSCOPY, CONIZATION;  Surgeon: Martha Talbert MD;  [facility]    CYSTOSCOPY N/A 05/24/2017    Procedure: CYSTOSCOPY;;  Surgeon: Martha Talbert MD;  Location: [facility] OR    CYSTOSCOPY, SLING TRANSVAGINAL N/A 07/11/2022    Procedure: VAGINAL SLING, WITH CYSTOSCOPY;  Surgeon: Kuldip Bolivar MD;  Location: [facility]    DAVINCI HYSTERECTOMY TOTAL, SALPINGECTOMY BILATERAL N/A 05/24/2017    Procedure: DAVINCI HYSTERECTOMY TOTAL, SALPINGECTOMY BILATERAL;  DaVinci Assisted Total Laparoscopic Hysterectomy, Bilateral Salpingectomy,  Cystoscopy, Anesthesia General;  Surgeon: Martha Talbert MD;  Location: [facility] OR    EYE SURGERY  04/2017    MOUTH SURGERY      wisdom teeth    REMOVE INTRAUTERINE DEVICE N/A 04/13/2017    Procedure: REMOVE INTRAUTERINE DEVICE;  Surgeon: Martha Talbert MD;  Location: [facility] OR       CURRENT MEDS:  Current Outpatient Medications   Medication    latanoprost (XALATAN) 0.005 % ophthalmic solution    Multiple Vitamins-Minerals  "(WOMENS MULTI VITAMIN & MINERAL PO)    propranolol (INDERAL) 10 MG tablet    timolol maleate (TIMOPTIC) 0.5 % ophthalmic solution     No current facility-administered medications for this visit.       Family History   Problem Relation Age of Onset    Thyroid Disease Mother     Lipids Mother     Depression Mother     Other Cancer Mother     Heart Disease Maternal Grandmother         MI    Cancer Maternal Grandfather         kidney        reports that she quit smoking about 14 years ago. Her smoking use included cigarettes. She quit smokeless tobacco use about 6 years ago. She reports current alcohol use. She reports that she does not use drugs.    Review of Systems:  Negative except some leg heaviness and pain, veins noted bilateral.     OBJECTIVE:  Vitals:    01/16/24 1044   BP: 108/74   Pulse: 74   Resp: 12   SpO2: 100%     There is no height or weight on file to calculate BMI.    Status: having discomfort    EXAM:  GENERAL: This is a well-developed 43 year old female who appears her stated age  HEAD: normocephalic  HEENT: Pupils equal and reactive bilaterally  CARDIAC: RRR without murmur  CHEST/LUNG:  Clear to auscultation  ABDOMEN: Soft, nontender, nondistended, no masses    NEUROLOGIC: Focally intact, nonfocal  VASCULAR: Pulses intact, symmetrical upper and lower extremities. Some varicose veins and spider veins bilaterally.                             LABS:  Lab Results   Component Value Date    WBC 5.4 07/18/2023    WBC 7.9 11/18/2014    HGB 12.5 07/18/2023    HGB 12.7 05/24/2017    HCT 37.4 07/18/2023    HCT 40.3 11/18/2014    MCV 97 07/18/2023    MCV 91 11/18/2014     07/18/2023     11/18/2014     INR/Prothrombin Time  @LABRCNTIP(NA,K,CL,co2,bun,creatinine,labglom,glucose,calcium)@  No results found for: \"HGBA1C\"  Lab Results   Component Value Date    ALT 14 07/18/2023    AST 21 07/18/2023    ALKPHOS 42 07/18/2023        Images:     Reviewed us showing no major incompetency, LAASV does but too " short of a segment.     Exam Information    Exam Date Exam Time Accession # Performing Department Results    1/16/24 10:49 AM [id number] [facility] [place]      PACS Images     Show images for US Venous Competency Bilateral     Study Result    Narrative & Impression   BILATERAL Venous Insufficiency Ultrasound (Date: 01/16/24)    BILATERAL Lower Extremity          Indication:  Surveillance Bilateral Symptomatic Varicose Veins, Leg Pain, and Swelling.      Previous: None     Patient History: Swelling     Presenting Symptoms:  Swelling, Varicose Veins, and Pain     Technique:   Supine and Upright Ultrasound of the Deep and Superficial Veins with Valsalva and Compression Augmentation Maneuvers. Duplex Imaging is performed utilizing gray-scale, Two-dimensional images, color-flow imaging, Doppler waveform analysis, and Spectral doppler imaging done with provacative maneuvers.      Incompetency Criteria:  Deep vein reflux reported when greater than 1000 msec flow reversal. Superficial vein reflux reported when equal to or greater than 600 msec flow reversal.  vein reflux reported as greater than 350 msec flow reversal.      Right  Leg Deep Veins    CFV SFJ PFV PROX FV   PROX FV MID FV DIST POP V. PERON.   V. PTV'S   Compressibility  (FC,PC,NC) FC FC FC FC FC FC FC FC FC   Reflux -   - - - - -   -         Right Leg Saphenous Veins  Location SFJ PROX THIGH KNEE MID CALF SPJ PROX CALF MID CALF   RT GSV (mm) 6.3 5.1 4.7 2.7         Reflux - - - -         RT SSV (mm)         5.2 2.7 2.0   Reflux         - - -      Location SFJ   RT AASV (mm) 2.3   Reflux -      Left Leg Deep Veins    CFV SFJ PFV PROX SFV PROX SFV MID SFV DIST POP V. PERON. V. PTV'S   Compressibility  (FC,PC,NC) FC FC FC FC FC FC FC FC FC   Reflux -   - - - - -   -         Lt Leg Saphenous Veins   Location SFJ PROX THIGH KNEE MID CALF SPJ PROX CALF MID CALF   LT GSV (mm) 6.2 6.0 4.9 3.2         Reflux - - -  -         LT SSV (mm)         4.0 1.6 2.0   Reflux         - - -      Location SFJ   LT AASV (mm) 4.0   Reflux +      Comments: No incompetent  veins visualized. Left AASV is straight enough to ablate. Short segment.      Impression:       Right Deep Vein Findings: Patent deep venous system with no evidence of DVT and no reflux     Left Deep Vein Findings: Patent deep venous system with no evidence of DVT and no reflux     Superficial Vein Findings:      Right Greater Saphenous vein: Patent Greater Saphenous Vein without evidence of reflux.     Right Small Saphenous Vein: Patent Small Saphenous Vein without evidence of reflux.     Left Greater Saphenous Vein: Patent Greater Saphenous Vein without evidence of reflux.     Left Small Saphenous Vein: Patent Small Saphenous Vein without evidence of reflux.     Perforating and Accessory Veins: Competent right anterior accessory saphenous vein at the saphenofemoral junction.  Incompetent left anterior accessory saphenous vein at the saphenofemoral junction with maximal diameter of 4 mm.     Reference:     Compressibility: FC= Fully compressible, PC= Partially compressible, NC= Non-compressible, NV= Not Visualized     Reflux: (+) Incompetent  (-) Competent, (NV) = Not Visualized     Interpretation criteria:          Duration of Retrograde flow (milliseconds)  Category Deep Veins Superficial Veins  veins   Competent < 1000ms < 500ms < 350ms   Incompetent > 1000ms > 500ms > 350ms              Assessment/Plan:   Symptomatic varicose veins of both lower extremities   Spiders bilateral    No closable veins at this time.  We discussed options related to this time point she is a candidate for sclerotherapy if she would like to try for both spider veins and forearms varicose veins.  This may not be covered by insurance other option for you can continue conservative management therapy.  At this time [place] continue conservative management think about her options  and will get back to us.      Return if symptoms worsen or fail to improve.     Kevin Arriaza MD ,MD  [facility] Department of Surgery

## 2024-09-03 DIAGNOSIS — F41.1 GAD (GENERALIZED ANXIETY DISORDER): ICD-10-CM

## 2024-09-05 RX ORDER — PROPRANOLOL HYDROCHLORIDE 10 MG/1
TABLET ORAL
Qty: 90 TABLET | Refills: 0 | OUTPATIENT
Start: 2024-09-05

## 2024-09-05 RX ORDER — PROPRANOLOL HYDROCHLORIDE 10 MG/1
10 TABLET ORAL 3 TIMES DAILY PRN
Qty: 60 TABLET | Refills: 0 | OUTPATIENT
Start: 2024-09-05

## 2024-09-05 NOTE — TELEPHONE ENCOUNTER
Left message for patient to call care team.     Patient requesting the following Medication:   Propranolol

## 2024-09-05 NOTE — TELEPHONE ENCOUNTER
Patient called back, able to schedule virtual visit with AMANDA Garner on 09/12, requesting refill until ov.       Zoila IVY  Station

## 2024-09-05 NOTE — TELEPHONE ENCOUNTER
Per Dr Soto Overdue for needed care. Please call to schedule est care with new provider  Jane Bhatia MSN, RN

## 2024-09-08 ENCOUNTER — HEALTH MAINTENANCE LETTER (OUTPATIENT)
Age: 44
End: 2024-09-08

## 2024-09-12 ENCOUNTER — VIRTUAL VISIT (OUTPATIENT)
Dept: FAMILY MEDICINE | Facility: CLINIC | Age: 44
End: 2024-09-12
Payer: COMMERCIAL

## 2024-09-12 DIAGNOSIS — F41.1 GAD (GENERALIZED ANXIETY DISORDER): ICD-10-CM

## 2024-09-12 PROCEDURE — 99213 OFFICE O/P EST LOW 20 MIN: CPT | Mod: 95 | Performed by: NURSE PRACTITIONER

## 2024-09-12 RX ORDER — PROPRANOLOL HYDROCHLORIDE 10 MG/1
10 TABLET ORAL 2 TIMES DAILY PRN
Qty: 90 TABLET | Refills: 2 | Status: SHIPPED | OUTPATIENT
Start: 2024-09-12

## 2024-09-12 ASSESSMENT — ANXIETY QUESTIONNAIRES
8. IF YOU CHECKED OFF ANY PROBLEMS, HOW DIFFICULT HAVE THESE MADE IT FOR YOU TO DO YOUR WORK, TAKE CARE OF THINGS AT HOME, OR GET ALONG WITH OTHER PEOPLE?: SOMEWHAT DIFFICULT
GAD7 TOTAL SCORE: 7
GAD7 TOTAL SCORE: 7
7. FEELING AFRAID AS IF SOMETHING AWFUL MIGHT HAPPEN: SEVERAL DAYS
GAD7 TOTAL SCORE: 7

## 2024-09-12 NOTE — PROGRESS NOTES
Joel is a 44 year old who is being evaluated via a billable video visit.    How would you like to obtain your AVS? MyChart  If the video visit is dropped, the invitation should be resent by: Text to cell phone: [phone]  Will anyone else be joining your video visit? No      Assessment & Plan     SORAYA (generalized anxiety disorder)   Controlled no change in treatment plan   - propranolol (INDERAL) 10 MG tablet; Take 1 tablet (10 mg) by mouth 2 times daily as needed (Anxiety).            See Patient Instructions    Subjective   Joel is a 44 year old, presenting for the following health issues:  No chief complaint on file.        9/12/2024     2:05 PM   Additional Questions   Roomed by [facility]     History of Present Illness       Mental Health Follow-up:  Patient presents to follow-up on Anxiety.    Patient's anxiety since last visit has been:  No change  The patient is not having other symptoms associated with anxiety.  Any significant life events: No and other  Patient is not feeling anxious or having panic attacks.  Patient has no concerns about alcohol or drug use.    She eats 2-3 servings of fruits and vegetables daily.She consumes 1 sweetened beverage(s) daily.She exercises with enough effort to increase her heart rate 30 to 60 minutes per day.  She exercises with enough effort to increase her heart rate 5 days per week.   She is taking medications regularly.           Review of Systems  Constitutional, HEENT, cardiovascular, pulmonary, gi and gu systems are negative, except as otherwise noted.      Objective           Vitals:  No vitals were obtained today due to virtual visit.    Physical Exam   GENERAL: alert and no distress  EYES: Eyes grossly normal to inspection.  No discharge or erythema, or obvious scleral/conjunctival abnormalities.  RESP: No audible wheeze, cough, or visible cyanosis.    SKIN: Visible skin clear. No significant rash, abnormal pigmentation or lesions.  NEURO: Cranial nerves grossly  intact.  Mentation and speech appropriate for age.  PSYCH: Appropriate affect, tone, and pace of words    No results found for any visits on 09/12/24.      Video-Visit Details    Type of service:  Video Visit   Originating Location (pt. Location): Home    Distant Location (provider location):  On-site  Platform used for Video Visit: Jaylyn  Signed Electronically by: LUX Morrison CNP

## 2025-07-22 ENCOUNTER — TELEPHONE (OUTPATIENT)
Dept: FAMILY MEDICINE | Facility: CLINIC | Age: 45
End: 2025-07-22

## 2025-07-22 ENCOUNTER — OFFICE VISIT (OUTPATIENT)
Dept: FAMILY MEDICINE | Facility: CLINIC | Age: 45
End: 2025-07-22
Payer: COMMERCIAL

## 2025-07-22 VITALS
WEIGHT: 142 LBS | HEART RATE: 56 BPM | RESPIRATION RATE: 16 BRPM | BODY MASS INDEX: 26.13 KG/M2 | SYSTOLIC BLOOD PRESSURE: 108 MMHG | DIASTOLIC BLOOD PRESSURE: 80 MMHG | TEMPERATURE: 97.8 F | HEIGHT: 62 IN | OXYGEN SATURATION: 99 %

## 2025-07-22 DIAGNOSIS — F41.1 GAD (GENERALIZED ANXIETY DISORDER): ICD-10-CM

## 2025-07-22 DIAGNOSIS — Z12.31 ENCOUNTER FOR SCREENING MAMMOGRAM FOR BREAST CANCER: ICD-10-CM

## 2025-07-22 DIAGNOSIS — D06.9 ADENOCARCINOMA IN SITU (AIS) OF UTERINE CERVIX: ICD-10-CM

## 2025-07-22 DIAGNOSIS — Z00.00 ENCOUNTER FOR ANNUAL PHYSICAL EXAM: ICD-10-CM

## 2025-07-22 DIAGNOSIS — Z00.00 ROUTINE GENERAL MEDICAL EXAMINATION AT A HEALTH CARE FACILITY: Primary | ICD-10-CM

## 2025-07-22 LAB
ANION GAP SERPL CALCULATED.3IONS-SCNC: 10 MMOL/L (ref 7–15)
BUN SERPL-MCNC: 12.4 MG/DL (ref 6–20)
CALCIUM SERPL-MCNC: 9.2 MG/DL (ref 8.8–10.4)
CHLORIDE SERPL-SCNC: 104 MMOL/L (ref 98–107)
CHOLEST SERPL-MCNC: 164 MG/DL
CREAT SERPL-MCNC: 0.84 MG/DL (ref 0.51–0.95)
EGFRCR SERPLBLD CKD-EPI 2021: 87 ML/MIN/1.73M2
ERYTHROCYTE [DISTWIDTH] IN BLOOD BY AUTOMATED COUNT: 12.3 % (ref 10–15)
FASTING STATUS PATIENT QL REPORTED: YES
FASTING STATUS PATIENT QL REPORTED: YES
GLUCOSE SERPL-MCNC: 84 MG/DL (ref 70–99)
HCO3 SERPL-SCNC: 25 MMOL/L (ref 22–29)
HCT VFR BLD AUTO: 38.8 % (ref 35–47)
HDLC SERPL-MCNC: 68 MG/DL
HGB BLD-MCNC: 12.9 G/DL (ref 11.7–15.7)
LDLC SERPL CALC-MCNC: 88 MG/DL
MCH RBC QN AUTO: 31.3 PG (ref 26.5–33)
MCHC RBC AUTO-ENTMCNC: 33.2 G/DL (ref 31.5–36.5)
MCV RBC AUTO: 94 FL (ref 78–100)
NONHDLC SERPL-MCNC: 96 MG/DL
PLATELET # BLD AUTO: 201 10E3/UL (ref 150–450)
POTASSIUM SERPL-SCNC: 4.5 MMOL/L (ref 3.4–5.3)
RBC # BLD AUTO: 4.12 10E6/UL (ref 3.8–5.2)
SODIUM SERPL-SCNC: 139 MMOL/L (ref 135–145)
TRIGL SERPL-MCNC: 40 MG/DL
TSH SERPL DL<=0.005 MIU/L-ACNC: 1.75 UIU/ML (ref 0.3–4.2)
WBC # BLD AUTO: 7.1 10E3/UL (ref 4–11)

## 2025-07-22 PROCEDURE — 3079F DIAST BP 80-89 MM HG: CPT | Performed by: NURSE PRACTITIONER

## 2025-07-22 PROCEDURE — 99396 PREV VISIT EST AGE 40-64: CPT | Performed by: NURSE PRACTITIONER

## 2025-07-22 PROCEDURE — 3048F LDL-C <100 MG/DL: CPT | Performed by: NURSE PRACTITIONER

## 2025-07-22 PROCEDURE — 84443 ASSAY THYROID STIM HORMONE: CPT | Performed by: NURSE PRACTITIONER

## 2025-07-22 PROCEDURE — 3074F SYST BP LT 130 MM HG: CPT | Performed by: NURSE PRACTITIONER

## 2025-07-22 PROCEDURE — 36415 COLL VENOUS BLD VENIPUNCTURE: CPT | Performed by: NURSE PRACTITIONER

## 2025-07-22 PROCEDURE — 1126F AMNT PAIN NOTED NONE PRSNT: CPT | Performed by: NURSE PRACTITIONER

## 2025-07-22 PROCEDURE — 80048 BASIC METABOLIC PNL TOTAL CA: CPT | Performed by: NURSE PRACTITIONER

## 2025-07-22 PROCEDURE — 85027 COMPLETE CBC AUTOMATED: CPT | Performed by: NURSE PRACTITIONER

## 2025-07-22 PROCEDURE — 80061 LIPID PANEL: CPT | Performed by: NURSE PRACTITIONER

## 2025-07-22 PROCEDURE — 87624 HPV HI-RISK TYP POOLED RSLT: CPT | Performed by: NURSE PRACTITIONER

## 2025-07-22 RX ORDER — PROPRANOLOL HYDROCHLORIDE 10 MG/1
10 TABLET ORAL 2 TIMES DAILY PRN
Qty: 90 TABLET | Refills: 3 | Status: SHIPPED | OUTPATIENT
Start: 2025-07-22

## 2025-07-22 RX ORDER — TIMOLOL MALEATE 5 MG/ML
SOLUTION/ DROPS OPHTHALMIC
Status: CANCELLED | OUTPATIENT
Start: 2025-07-22

## 2025-07-22 RX ORDER — LATANOPROST 50 UG/ML
SOLUTION/ DROPS OPHTHALMIC
Status: CANCELLED | OUTPATIENT
Start: 2025-07-22

## 2025-07-22 SDOH — HEALTH STABILITY: PHYSICAL HEALTH: ON AVERAGE, HOW MANY MINUTES DO YOU ENGAGE IN EXERCISE AT THIS LEVEL?: 40 MIN

## 2025-07-22 SDOH — HEALTH STABILITY: PHYSICAL HEALTH: ON AVERAGE, HOW MANY DAYS PER WEEK DO YOU ENGAGE IN MODERATE TO STRENUOUS EXERCISE (LIKE A BRISK WALK)?: 5 DAYS

## 2025-07-22 ASSESSMENT — PAIN SCALES - GENERAL: PAINLEVEL_OUTOF10: NO PAIN (0)

## 2025-07-22 ASSESSMENT — SOCIAL DETERMINANTS OF HEALTH (SDOH): HOW OFTEN DO YOU GET TOGETHER WITH FRIENDS OR RELATIVES?: TWICE A WEEK

## 2025-07-22 NOTE — PROGRESS NOTES
"Preventive Care Visit  [facility]  LUX Morrison CNP, Family Medicine  Jul 22, 2025  {Provider  Link to [facility] :[id number]}    Assessment & Plan     Encounter for annual physical exam  Routine general medical examination at a health care facility  - REVIEW OF HEALTH MAINTENANCE PROTOCOL ORDERS  - Lipid panel reflex to direct LDL Fasting; Future  - CBC with platelets; Future  - Basic metabolic panel  (Ca, Cl, CO2, Creat, Gluc, K, Na, BUN); Future  - TSH with free T4 reflex; Future    SORAYA (generalized anxiety disorder)  Chronic. stable  - propranolol (INDERAL) 10 MG tablet; Take 1 tablet (10 mg) by mouth 2 times daily as needed (Anxiety).    Adenocarcinoma in situ (AIS) of uterine cervix  - Primary HPV with Optional Reflex to Pap - Age 30 - 65 Years    Encounter for screening mammogram for breast cancer  - MA Screen Bilateral w/Mohamud; Future      BMI  Estimated body mass index is 25.97 kg/m  as calculated from the following:    Height as of this encounter: 1.575 m (5' 2\").    Weight as of this encounter: 64.4 kg (142 lb).   Weight management plan: Discussed healthy diet and exercise guidelines    Counseling  Appropriate preventive services were addressed with this patient via screening, questionnaire, or discussion as appropriate for fall prevention, nutrition, physical activity, Tobacco-use cessation, social engagement, weight loss and cognition.  Checklist reviewing preventive services available has been given to the patient.  Reviewed patient's diet, addressing concerns and/or questions.   Reviewed preventive health counseling, as reflected in patient instructions       Regular exercise       Healthy diet/nutrition        Lam Maldonado is a 45 year old, presenting for the following:  Physical        7/22/2025    11:12 AM   Additional Questions   Roomed by Dayana RODRIGUEZ  No concerns     Advance Care Planning  {The storyboard will display whether the patient has ACP docs on " file. Hover over the Code section in the storyboard to access the ACP documents. :[id number]}  Discussed advance care planning with patient; however, patient declined at this time.        7/22/2025   General Health   How would you rate your overall physical health? Good   Feel stress (tense, anxious, or unable to sleep) Only a little   (!) STRESS CONCERN      7/22/2025   Nutrition   Three or more servings of calcium each day? Yes   Diet: Carbohydrate counting   How many servings of fruit and vegetables per day? (!) 0-1   How many sweetened beverages each day? 0-1         7/22/2025   Exercise   Days per week of moderate/strenous exercise 5 days   Average minutes spent exercising at this level 40 min         7/22/2025   Social Factors   Frequency of gathering with friends or relatives Twice a week   Worry food won't last until get money to buy more No   Food not last or not have enough money for food? No   Do you have housing? (Housing is defined as stable permanent housing and does not include staying outside in a car, in a tent, in an abandoned building, in an overnight shelter, or couch-surfing.) Yes   Are you worried about losing your housing? No   Lack of transportation? No   Unable to get utilities (heat,electricity)? No         7/22/2025   Dental   Dentist two times every year? Yes         Today's PHQ-2 Score:       7/22/2025    11:09 AM   PHQ-2 ( 1999 [employer])   Q1: Little interest or pleasure in doing things 0   Q2: Feeling down, depressed or hopeless 0   PHQ-2 Score 0    Q1: Little interest or pleasure in doing things Not at all   Q2: Feeling down, depressed or hopeless Not at all   PHQ-2 Score 0       Patient-reported           7/22/2025   Substance Use   Alcohol more than 3/day or more than 7/wk No   Do you use any other substances recreationally? No     Social History     Tobacco Use    Smoking status: Former     Current packs/day: 0.00     Types: Cigarettes     Quit date: 1/1/2010     Years since quitting:  15.5    Smokeless tobacco: Former     Quit date: 3/26/2017    Tobacco comments:     quit 1/1/10   Vaping Use    Vaping status: Never Used   Substance Use Topics    Alcohol use: Yes     Comment: occas - 5-6 beers 2 times weekly    Drug use: No     {Provider  If there are gaps in the social history shown above, please follow the link to update and then refresh the note Link to Social and Substance History :[id number]}      9/13/2023   LAST FHS-7 RESULTS   1st degree relative breast or ovarian cancer No   Any relative bilateral breast cancer No   Any male have breast cancer No   Any ONE woman have BOTH breast AND ovarian cancer No   Any woman with breast cancer before 50yrs No   2 or more relatives with breast AND/OR ovarian cancer No   2 or more relatives with breast AND/OR bowel cancer No     {If any of the questions to the FHS7 are answered yes, consider referral for genetic counseling.    Additional indications for genetic referral include personal history of breast or ovarian cancer, genetic mutation in 1st degree relative which increases risk of breast cancer including BRCA1, BRCA2, TRACEE, PALB 2, TP53, CHEK2, PTEN, CDH1, STK11 (per ACS) and/or 1st degree relative with history of pancreatic or high-risk prostate cancer (per NCCN):[id number]}   Mammogram Screening - Mammogram every 1-2 years updated in Health Maintenance based on mutual decision making        7/22/2025   STI Screening   New sexual partner(s) since last STI/HIV test? No     History of abnormal Pap smear: Yes        Latest Ref Rng & Units 7/18/2023     4:21 PM 5/5/2022     8:01 AM 4/6/2021     6:13 PM   PAP / HPV   PAP  Negative for Intraepithelial Lesion or Malignancy (NILM)  Negative for Intraepithelial Lesion or Malignancy (NILM)     PAP (Historical)    NIL    HPV 16 DNA Negative Negative  Negative     HPV 18 DNA Negative Negative  Negative     Other HR HPV Negative Negative  Negative       ASCVD Risk   The 10-year ASCVD risk score (Se MARTÍNEZ, et  al., 2019) is: 0.1%    Values used to calculate the score:      Age: 45 years      Sex: Female      Is Non- : No      Diabetic: No      Tobacco smoker: No      Systolic Blood Pressure: 108 mmHg      Is BP treated: No      HDL Cholesterol: 91 mg/dL      Total Cholesterol: 149 mg/dL    {Provider  REQUIRED FOR AWV Use the storyboard to review patient history, after sections have been marked as reviewed, refresh note to capture documentation:[id number]}   Reviewed and updated as needed this visit by Provider                    BP Readings from Last 3 Encounters:   07/22/25 108/80   01/16/24 108/74   09/26/23 113/79    Wt Readings from Last 3 Encounters:   07/22/25 64.4 kg (142 lb)   07/18/23 67.6 kg (149 lb)   08/23/22 69.9 kg (154 lb)                  Patient Active Problem List   Diagnosis    Varicose veins of both lower extremities with pain    CARDIOVASCULAR SCREENING; LDL GOAL LESS THAN 160    Acne    Dermatitis    Adenocarcinoma in situ (AIS) of uterine cervix    Lichen sclerosus of female genitalia    FH: hemochromatosis    SORAYA (generalized anxiety disorder)    Other specified glaucoma    Urinary, incontinence, stress female    Urethral hypermobility     Past Surgical History:   Procedure Laterality Date    ABDOMEN SURGERY  03/2017    BIOPSY VULVA N/A 04/13/2017    Procedure: BIOPSY VULVA;  Surgeon: Martha Talbert MD;  Location: [facility]    COLPOSCOPY, CONIZATION, COMBINED N/A 04/13/2017    Procedure: COMBINED COLPOSCOPY, CONIZATION;  Surgeon: Martha Talbert MD;  Location: [facility] OR    CYSTOSCOPY N/A 05/24/2017    Procedure: CYSTOSCOPY;;  Surgeon: Martha Talbert MD;  Location: [facility] OR    CYSTOSCOPY, SLING TRANSVAGINAL N/A 07/11/2022    Procedure: VAGINAL SLING, WITH CYSTOSCOPY;  Surgeon: Kuldip Bolivar MD;  Location: [facility]    DAVINCI HYSTERECTOMY TOTAL, SALPINGECTOMY BILATERAL N/A 05/24/2017    Procedure: DAVINCI HYSTERECTOMY TOTAL, SALPINGECTOMY BILATERAL;  DaVinci Assisted  Total Laparoscopic Hysterectomy, Bilateral Salpingectomy,  Cystoscopy, Anesthesia General;  Surgeon: Martha Talbert MD;  Location: [facility] OR    EYE SURGERY  04/2017    MOUTH SURGERY      wisdom teeth    REMOVE INTRAUTERINE DEVICE N/A 04/13/2017    Procedure: REMOVE INTRAUTERINE DEVICE;  Surgeon: Martha Talbert MD;  Location:  OR       Social History     Tobacco Use    Smoking status: Former     Current packs/day: 0.00     Types: Cigarettes     Quit date: 1/1/2010     Years since quitting: 15.5    Smokeless tobacco: Former     Quit date: 3/26/2017    Tobacco comments:     quit 1/1/10   Substance Use Topics    Alcohol use: Yes     Comment: occas - 5-6 beers 2 times weekly     Family History   Problem Relation Age of Onset    Thyroid Disease Mother     Lipids Mother     Depression Mother     Other Cancer Mother     Heart Disease Maternal Grandmother         MI    Cancer Maternal Grandfather         kidney         Current Outpatient Medications   Medication Sig Dispense Refill    latanoprost (XALATAN) 0.005 % ophthalmic solution       Multiple Vitamins-Minerals (WOMENS MULTI VITAMIN & MINERAL PO) Take by mouth daily       propranolol (INDERAL) 10 MG tablet Take 1 tablet (10 mg) by mouth 2 times daily as needed (Anxiety). 90 tablet 2    timolol maleate (TIMOPTIC) 0.5 % ophthalmic solution INSTILL 1 DROP INTO EACH EYE ONCE DAILY IN THE MORNING       Allergies   Allergen Reactions    Diflucan [Fluconazole] Itching and Swelling    Tylenol W/Codeine [Acetaminophen-Codeine] Nausea and Vomiting     Recent Labs   Lab Test 07/18/23  1641 07/08/22  1617 05/05/22  0903 05/24/17  0603   LDL  --   --  51  --    HDL  --   --  91  --    TRIG  --   --  34  --    ALT 14  --   --   --    CR 0.76 0.67  --  0.80   GFRESTIMATED >90 >90  --  81   GFRESTBLACK  --   --   --  >90  African American GFR Calc     POTASSIUM 4.4 4.1  --  4.1          Review of Systems  Constitutional, HEENT, cardiovascular, pulmonary, gi and gu systems are  "negative, except as otherwise noted.     Objective    Exam  /80   Pulse 56   Temp 97.8  F (36.6  C) (Tympanic)   Resp 16   Ht 1.575 m (5' 2\")   Wt 64.4 kg (142 lb)   LMP 05/01/2017   SpO2 99%   BMI 25.97 kg/m     Estimated body mass index is 25.97 kg/m  as calculated from the following:    Height as of this encounter: 1.575 m (5' 2\").    Weight as of this encounter: 64.4 kg (142 lb).    Physical Exam  GENERAL: alert and no distress  EYES: Eyes grossly normal to inspection, PERRL and conjunctivae and sclerae normal  HENT: ear canals and TM's normal, nose and mouth without ulcers or lesions  NECK: no adenopathy, no asymmetry, masses, or scars  RESP: lungs clear to auscultation - no rales, rhonchi or wheezes  CV: regular rate and rhythm, normal S1 S2, no S3 or S4, no murmur, click or rub, no peripheral edema  ABDOMEN: soft, nontender, no hepatosplenomegaly, no masses and bowel sounds normal  MS: no gross musculoskeletal defects noted, no edema  SKIN: no suspicious lesions or rashes  NEURO: Normal strength and tone, mentation intact and speech normal  PSYCH: mentation appears normal, affect normal/bright      MEI Kaur Student July 22, 2025  Signed Electronically by: LUX Morrison CNP  {Email feedback regarding this note to [email or web address]   :[id number]}  "

## 2025-07-22 NOTE — TELEPHONE ENCOUNTER
FYI - Status Update    Who is Calling: patient    Update: Pt say she was asked today who her PCP was . She says she did some research and would like Dr Rekha Schumacher listed as PCP although she has not seen her yet.     Does caller want a call/response back: No

## 2025-07-22 NOTE — PATIENT INSTRUCTIONS
Patient Education   Preventive Care Advice   This is general advice given by our system to help you stay healthy. However, your care team may have specific advice just for you. Please talk to your care team about your preventive care needs.  Nutrition  Eat 5 or more servings of fruits and vegetables each day.  Try wheat bread, brown rice and whole grain pasta (instead of white bread, rice, and pasta).  Get enough calcium and vitamin D. Check the label on foods and aim for 100% of the RDA (recommended daily allowance).  Lifestyle  Exercise at least 150 minutes each week  (30 minutes a day, 5 days a week).  Do muscle strengthening activities 2 days a week. These help control your weight and prevent disease.  No smoking.  Wear sunscreen to prevent skin cancer.  Have a dental exam and cleaning every 6 months.  Yearly exams  See your health care team every year to talk about:  Any changes in your health.  Any medicines your care team has prescribed.  Preventive care, family planning, and ways to prevent chronic diseases.  Shots (vaccines)   HPV shots (up to age 26), if you've never had them before.  Hepatitis B shots (up to age 59), if you've never had them before.  COVID-19 shot: Get this shot when it's due.  Flu shot: Get a flu shot every year.  Tetanus shot: Get a tetanus shot every 10 years.  Pneumococcal, hepatitis A, and RSV shots: Ask your care team if you need these based on your risk.  Shingles shot (for age 50 and up)  General health tests  Diabetes screening:  Starting at age 35, Get screened for diabetes at least every 3 years.  If you are younger than age 35, ask your care team if you should be screened for diabetes.  Cholesterol test: At age 39, start having a cholesterol test every 5 years, or more often if advised.  Bone density scan (DEXA): At age 50, ask your care team if you should have this scan for osteoporosis (brittle bones).  Hepatitis C: Get tested at least once in your life.  STIs (sexually  transmitted infections)  Before age 24: Ask your care team if you should be screened for STIs.  After age 24: Get screened for STIs if you're at risk. You are at risk for STIs (including HIV) if:  You are sexually active with more than one person.  You don't use condoms every time.  You or a partner was diagnosed with a sexually transmitted infection.  If you are at risk for HIV, ask about PrEP medicine to prevent HIV.  Get tested for HIV at least once in your life, whether you are at risk for HIV or not.  Cancer screening tests  Cervical cancer screening: If you have a cervix, begin getting regular cervical cancer screening tests starting at age 21.  Breast cancer scan (mammogram): If you've ever had breasts, begin having regular mammograms starting at age 40. This is a scan to check for breast cancer.  Colon cancer screening: It is important to start screening for colon cancer at age 45.  Have a colonoscopy test every 10 years (or more often if you're at risk) Or, ask your provider about stool tests like a FIT test every year or Cologuard test every 3 years.  To learn more about your testing options, visit:   .  For help making a decision, visit:   https://bit.ly/rb59599.  Prostate cancer screening test: If you have a prostate, ask your care team if a prostate cancer screening test (PSA) at age 55 is right for you.  Lung cancer screening: If you are a current or former smoker ages 50 to 80, ask your care team if ongoing lung cancer screenings are right for you.  For informational purposes only. Not to replace the advice of your health care provider. Copyright   2023 [employer]. All rights reserved. Clinically reviewed by the [facility]. [employer] [id number] - REV 01/24.

## 2025-07-23 LAB
HPV HR 12 DNA CVX QL NAA+PROBE: NEGATIVE
HPV16 DNA CVX QL NAA+PROBE: NEGATIVE
HPV18 DNA CVX QL NAA+PROBE: NEGATIVE
HUMAN PAPILLOMA VIRUS FINAL DIAGNOSIS: NORMAL

## (undated) DEVICE — ESU ELEC BLADE 6" COATED E1450-6

## (undated) DEVICE — DAVINCI XI DRAPE ARM 470015

## (undated) DEVICE — GOWN IMPERVIOUS SPECIALTY XLG/XLONG 32474

## (undated) DEVICE — CATH RED RUBBER 14FR LATEX 0094140

## (undated) DEVICE — SOL NACL 0.9% IRRIG 500ML BOTTLE 2F7123

## (undated) DEVICE — TUBING CONMED AIRSEAL SMOKE EVAC INSUFFLATION ASM-EVAC

## (undated) DEVICE — NDL SPINAL 18GA 3.5" 405184

## (undated) DEVICE — DAVINCI XI MONOPOLAR SCISSORS HOT SHEARS 8MM 470179

## (undated) DEVICE — KIT PATIENT POSITIONING PIGAZZI LATEX FREE 40580

## (undated) DEVICE — SUCTION TIP YANKAUER W/O VENT BULBOUS TIP

## (undated) DEVICE — GLOVE PROTEXIS W/NEU-THERA 6.5  2D73TE65

## (undated) DEVICE — CATH TRAY FOLEY SURESTEP 16FR W/URINE MTR STATLK LF A303416A

## (undated) DEVICE — SPECIMEN TRAP MUCOUS 40ML LUKI C30200A

## (undated) DEVICE — SU VICRYL 4-0 FS-1 27" J441H

## (undated) DEVICE — TUBING IRRIG CYSTO/BLADDER SET 81" LF 2C4040

## (undated) DEVICE — RETR ELEV / UTERINE MANIPULATOR V-CARE MED CUP 60-6085-201A

## (undated) DEVICE — SOL NACL 0.9% INJ 1000ML BAG 07983-09

## (undated) DEVICE — Device

## (undated) DEVICE — LINEN TOWEL PACK X6 WHITE 5487

## (undated) DEVICE — WIPES FOLEY CARE SURESTEP PROVON DFC100

## (undated) DEVICE — GLOVE PROTEXIS W/NEU-THERA 7.5  2D73TE75

## (undated) DEVICE — DRSG TELFA 3X8" 1238

## (undated) DEVICE — LINEN TOWEL PACK X5 5464

## (undated) DEVICE — SPECIMEN CONTAINER 5OZ STERILE 2600SA

## (undated) DEVICE — PREP CHLORHEXIDINE 4% 4OZ (HIBICLENS) 57504

## (undated) DEVICE — KOH COLPOTOMIZER OCCLUDER  CPO-6

## (undated) DEVICE — DRSG PRIMAPORE 02X3" 7133

## (undated) DEVICE — SOL NACL 0.9% IRRIG 1000ML BOTTLE 07138-09

## (undated) DEVICE — SPECIMEN CONTAINER URINE 90ML STERILE 75.1435.002

## (undated) DEVICE — DRAPE SHEET REV FOLD 3/4 9349

## (undated) DEVICE — SUCTION MANIFOLD DORNOCH ULTRA CART UL-CL500

## (undated) DEVICE — SUCTION MANIFOLD NEPTUNE 2 SYS 1 PORT 702-025-000

## (undated) DEVICE — SOL WATER IRRIG 500ML BOTTLE 2F7113

## (undated) DEVICE — DAVINCI XI GRASPER ENDOWRIST PROGRASP 470093

## (undated) DEVICE — DECANTER VIAL 2006S

## (undated) DEVICE — PROTECTOR ARM ONE-STEP TRENDELENBURG 40418

## (undated) DEVICE — DAVINCI XI NDL DRIVER MEGA SUTURE CUT 8MM 470309

## (undated) DEVICE — ADH SKIN CLOSURE PREMIERPRO EXOFIN 1.0ML 3470

## (undated) DEVICE — ESU GROUND PAD ADULT REM W/15' CORD E7507DB

## (undated) DEVICE — DAVINCI XI DRAPE COLUMN 470341

## (undated) DEVICE — SU VICRYL 0 TIE 54" J608H

## (undated) DEVICE — SU SILK 2-0 PS 18" 1588H

## (undated) DEVICE — PACK GOWN 3/PK DISP XL SBA32GPFCB

## (undated) DEVICE — GLOVE PROTEXIS BLUE W/NEU-THERA 7.0  2D73EB70

## (undated) DEVICE — TUBING SUCTION 12"X1/4" N612

## (undated) DEVICE — SU VICRYL 2-0 CT-2 27" J333H

## (undated) DEVICE — PAD PERI INDIV WRAP 11" 2022

## (undated) DEVICE — SOL NACL 0.9% IRRIG 1000ML BOTTLE 2F7124

## (undated) DEVICE — SU VICRYL 2-0 CT-1 27" UND J259H

## (undated) DEVICE — SWAB PROCTO 16" 2/PK 32-046

## (undated) DEVICE — SUCTION IRR STRYKERFLOW II W/TIP 250-070-520

## (undated) DEVICE — PACK LAPAROSCOPY/PELVISCOPY STD

## (undated) DEVICE — DAVINCI XI FCP BIPOLAR FENESTRATED 470205

## (undated) DEVICE — TUBING CYSTO/BLADDER IRRIG SET 80" 06544-01

## (undated) DEVICE — ENDO TROCAR CONMED AIRSEAL BLADELESS 05X120MM IAS5-120LP

## (undated) DEVICE — PAD CHUX UNDERPAD 30X30"

## (undated) DEVICE — BLADE CLIPPER 4406

## (undated) DEVICE — DAVINCI HOT SHEARS TIP COVER  400180

## (undated) DEVICE — DRAPE LEGGINGS CLEAR 8430

## (undated) DEVICE — SUCTION CURETTE 3MM ENDOMETRIAL MX140

## (undated) DEVICE — SU VICRYL 0 CT-2 27" J334H

## (undated) DEVICE — DAVINCI XI SEAL UNIVERSAL 5-8MM 470361

## (undated) DEVICE — SOL WATER IRRIG 1000ML BOTTLE 07139-09

## (undated) DEVICE — DRAPE MAYO STAND 23X54 8337

## (undated) DEVICE — LUBRICATING JELLY 4.25OZ

## (undated) DEVICE — SYSTEM CLEARIFY VISUALIZATION 21-345

## (undated) DEVICE — PACK VAG HYST

## (undated) DEVICE — SPONGE COTTONOID 1/2X1 1/2" 20-06S

## (undated) DEVICE — BLADE KNIFE SURG 11 371111

## (undated) DEVICE — JELLY LUBRICATING SURGILUBE 2OZ TUBE

## (undated) DEVICE — SU VICRYL 4-0 PS-2 18" UND J496H

## (undated) DEVICE — SYR 50ML LL W/O NDL 309653

## (undated) DEVICE — DAVINCI TROCAR 8MM BLADELESS 470357

## (undated) DEVICE — DECANTER BAG 2002S

## (undated) DEVICE — TUBING SMOKE EVAC 1CMX3M SEA3710

## (undated) DEVICE — SYR 30ML SLIP TIP W/O NDL 302833

## (undated) DEVICE — PAD FLOOR SURGISAFE

## (undated) DEVICE — PREP SCRUB CARE CHLOROXYLENOL (PCMX) 4OZ 29902-004

## (undated) DEVICE — SOL ADH LIQUID BENZOIN SWAB 0.6ML C1544

## (undated) DEVICE — LINEN TOWEL PACK X30 5481

## (undated) DEVICE — ESU GROUND PAD ADULT W/CORD E7507

## (undated) DEVICE — NDL INSUFFLATION 120MM VERRES 172015

## (undated) DEVICE — SU WND CLOSURE VLOC 90 ABS 2-0 UND 9" GS-22 VLOCM2245

## (undated) DEVICE — SPONGE SURGIFOAM 100 1974

## (undated) RX ORDER — PROPOFOL 10 MG/ML
INJECTION, EMULSION INTRAVENOUS
Status: DISPENSED
Start: 2022-07-11

## (undated) RX ORDER — FENTANYL CITRATE 50 UG/ML
INJECTION, SOLUTION INTRAMUSCULAR; INTRAVENOUS
Status: DISPENSED
Start: 2022-07-11

## (undated) RX ORDER — HYDROMORPHONE HCL IN WATER/PF 6 MG/30 ML
PATIENT CONTROLLED ANALGESIA SYRINGE INTRAVENOUS
Status: DISPENSED
Start: 2022-07-11

## (undated) RX ORDER — KETOROLAC TROMETHAMINE 30 MG/ML
INJECTION, SOLUTION INTRAMUSCULAR; INTRAVENOUS
Status: DISPENSED
Start: 2017-04-13

## (undated) RX ORDER — ATROPA BELLADONNA AND OPIUM 16.2; 6 MG/1; MG/1
SUPPOSITORY RECTAL
Status: DISPENSED
Start: 2022-07-11

## (undated) RX ORDER — KETOROLAC TROMETHAMINE 30 MG/ML
INJECTION, SOLUTION INTRAMUSCULAR; INTRAVENOUS
Status: DISPENSED
Start: 2022-07-11

## (undated) RX ORDER — FENTANYL CITRATE 50 UG/ML
INJECTION, SOLUTION INTRAMUSCULAR; INTRAVENOUS
Status: DISPENSED
Start: 2017-05-24

## (undated) RX ORDER — PHENAZOPYRIDINE HYDROCHLORIDE 200 MG/1
TABLET, FILM COATED ORAL
Status: DISPENSED
Start: 2017-05-24

## (undated) RX ORDER — DEXAMETHASONE SODIUM PHOSPHATE 4 MG/ML
INJECTION, SOLUTION INTRA-ARTICULAR; INTRALESIONAL; INTRAMUSCULAR; INTRAVENOUS; SOFT TISSUE
Status: DISPENSED
Start: 2017-04-13

## (undated) RX ORDER — PROPOFOL 10 MG/ML
INJECTION, EMULSION INTRAVENOUS
Status: DISPENSED
Start: 2017-04-13

## (undated) RX ORDER — ONDANSETRON 2 MG/ML
INJECTION INTRAMUSCULAR; INTRAVENOUS
Status: DISPENSED
Start: 2022-07-11

## (undated) RX ORDER — OXYCODONE HYDROCHLORIDE 5 MG/1
TABLET ORAL
Status: DISPENSED
Start: 2017-04-13

## (undated) RX ORDER — CEFAZOLIN SODIUM 2 G/100ML
INJECTION, SOLUTION INTRAVENOUS
Status: DISPENSED
Start: 2017-05-24

## (undated) RX ORDER — FERRIC SUBSULFATE 0.21 G/G
LIQUID TOPICAL
Status: DISPENSED
Start: 2017-04-13

## (undated) RX ORDER — ACETAMINOPHEN 325 MG/1
TABLET ORAL
Status: DISPENSED
Start: 2017-05-24

## (undated) RX ORDER — GABAPENTIN 300 MG/1
CAPSULE ORAL
Status: DISPENSED
Start: 2017-04-13

## (undated) RX ORDER — GABAPENTIN 300 MG/1
CAPSULE ORAL
Status: DISPENSED
Start: 2022-07-11

## (undated) RX ORDER — BUPIVACAINE HYDROCHLORIDE 5 MG/ML
INJECTION, SOLUTION EPIDURAL; INTRACAUDAL
Status: DISPENSED
Start: 2017-04-13

## (undated) RX ORDER — MEPERIDINE HYDROCHLORIDE 25 MG/ML
INJECTION INTRAMUSCULAR; INTRAVENOUS; SUBCUTANEOUS
Status: DISPENSED
Start: 2022-07-11

## (undated) RX ORDER — ONDANSETRON 2 MG/ML
INJECTION INTRAMUSCULAR; INTRAVENOUS
Status: DISPENSED
Start: 2017-04-13

## (undated) RX ORDER — LIDOCAINE HYDROCHLORIDE 10 MG/ML
INJECTION, SOLUTION EPIDURAL; INFILTRATION; INTRACAUDAL; PERINEURAL
Status: DISPENSED
Start: 2022-07-11

## (undated) RX ORDER — ACETIC ACID 5 %
LIQUID (ML) MISCELLANEOUS
Status: DISPENSED
Start: 2017-04-13

## (undated) RX ORDER — LIDOCAINE HYDROCHLORIDE 10 MG/ML
INJECTION, SOLUTION EPIDURAL; INFILTRATION; INTRACAUDAL; PERINEURAL
Status: DISPENSED
Start: 2017-04-13

## (undated) RX ORDER — CEFAZOLIN SODIUM/WATER 2 G/20 ML
SYRINGE (ML) INTRAVENOUS
Status: DISPENSED
Start: 2022-07-11

## (undated) RX ORDER — ACETAMINOPHEN 325 MG/1
TABLET ORAL
Status: DISPENSED
Start: 2022-07-11

## (undated) RX ORDER — FENTANYL CITRATE 50 UG/ML
INJECTION, SOLUTION INTRAMUSCULAR; INTRAVENOUS
Status: DISPENSED
Start: 2017-04-13

## (undated) RX ORDER — ACETAMINOPHEN 325 MG/1
TABLET ORAL
Status: DISPENSED
Start: 2017-04-13

## (undated) RX ORDER — BUPIVACAINE HYDROCHLORIDE AND EPINEPHRINE 5; 5 MG/ML; UG/ML
INJECTION, SOLUTION EPIDURAL; INTRACAUDAL; PERINEURAL
Status: DISPENSED
Start: 2022-07-11

## (undated) RX ORDER — HEPARIN SODIUM 5000 [USP'U]/.5ML
INJECTION, SOLUTION INTRAVENOUS; SUBCUTANEOUS
Status: DISPENSED
Start: 2017-05-24

## (undated) RX ORDER — LIDOCAINE HYDROCHLORIDE AND EPINEPHRINE 10; 10 MG/ML; UG/ML
INJECTION, SOLUTION INFILTRATION; PERINEURAL
Status: DISPENSED
Start: 2017-05-24

## (undated) RX ORDER — HYDROMORPHONE HCL/0.9% NACL/PF 0.2MG/0.2
SYRINGE (ML) INTRAVENOUS
Status: DISPENSED
Start: 2017-05-24

## (undated) RX ORDER — PHENAZOPYRIDINE HYDROCHLORIDE 200 MG/1
TABLET, FILM COATED ORAL
Status: DISPENSED
Start: 2022-07-11